# Patient Record
Sex: FEMALE | Race: WHITE | Employment: FULL TIME | URBAN - METROPOLITAN AREA
[De-identification: names, ages, dates, MRNs, and addresses within clinical notes are randomized per-mention and may not be internally consistent; named-entity substitution may affect disease eponyms.]

---

## 2018-02-19 ENCOUNTER — OFFICE VISIT (OUTPATIENT)
Dept: FAMILY MEDICINE CLINIC | Age: 68
End: 2018-02-19
Payer: MEDICARE

## 2018-02-19 VITALS
HEIGHT: 64 IN | SYSTOLIC BLOOD PRESSURE: 140 MMHG | DIASTOLIC BLOOD PRESSURE: 80 MMHG | BODY MASS INDEX: 30.56 KG/M2 | WEIGHT: 179 LBS | TEMPERATURE: 97.4 F | RESPIRATION RATE: 18 BRPM

## 2018-02-19 DIAGNOSIS — Z86.61 HISTORY OF MENINGITIS: ICD-10-CM

## 2018-02-19 DIAGNOSIS — M19.011 PRIMARY OSTEOARTHRITIS OF BOTH SHOULDERS: ICD-10-CM

## 2018-02-19 DIAGNOSIS — J45.20 MILD INTERMITTENT ASTHMA WITHOUT COMPLICATION: Primary | ICD-10-CM

## 2018-02-19 DIAGNOSIS — K44.9 HIATAL HERNIA: ICD-10-CM

## 2018-02-19 DIAGNOSIS — D69.1 DELTA STORAGE POOL DISEASE (HCC): ICD-10-CM

## 2018-02-19 DIAGNOSIS — D68.51 HOMOZYGOUS FACTOR V LEIDEN MUTATION (HCC): ICD-10-CM

## 2018-02-19 DIAGNOSIS — K21.9 GASTROESOPHAGEAL REFLUX DISEASE, ESOPHAGITIS PRESENCE NOT SPECIFIED: ICD-10-CM

## 2018-02-19 DIAGNOSIS — D12.6 COLON ADENOMA: ICD-10-CM

## 2018-02-19 DIAGNOSIS — Z86.19 HISTORY OF LYME DISEASE: ICD-10-CM

## 2018-02-19 DIAGNOSIS — F41.9 ANXIETY: ICD-10-CM

## 2018-02-19 DIAGNOSIS — G47.9 SLEEP DISTURBANCE: ICD-10-CM

## 2018-02-19 DIAGNOSIS — Z23 NEED FOR VACCINATION WITH 13-POLYVALENT PNEUMOCOCCAL CONJUGATE VACCINE: ICD-10-CM

## 2018-02-19 DIAGNOSIS — M48.02 CERVICAL STENOSIS OF SPINE: ICD-10-CM

## 2018-02-19 DIAGNOSIS — F32.4 MAJOR DEPRESSIVE DISORDER WITH SINGLE EPISODE, IN PARTIAL REMISSION (HCC): ICD-10-CM

## 2018-02-19 DIAGNOSIS — M17.0 PRIMARY OSTEOARTHRITIS OF BOTH KNEES: ICD-10-CM

## 2018-02-19 DIAGNOSIS — E78.5 HYPERLIPIDEMIA, UNSPECIFIED HYPERLIPIDEMIA TYPE: ICD-10-CM

## 2018-02-19 DIAGNOSIS — M19.012 PRIMARY OSTEOARTHRITIS OF BOTH SHOULDERS: ICD-10-CM

## 2018-02-19 PROCEDURE — 3014F SCREEN MAMMO DOC REV: CPT | Performed by: PEDIATRICS

## 2018-02-19 PROCEDURE — 3017F COLORECTAL CA SCREEN DOC REV: CPT | Performed by: PEDIATRICS

## 2018-02-19 PROCEDURE — G8484 FLU IMMUNIZE NO ADMIN: HCPCS | Performed by: PEDIATRICS

## 2018-02-19 PROCEDURE — G0009 ADMIN PNEUMOCOCCAL VACCINE: HCPCS | Performed by: PEDIATRICS

## 2018-02-19 PROCEDURE — 90670 PCV13 VACCINE IM: CPT | Performed by: PEDIATRICS

## 2018-02-19 PROCEDURE — 1123F ACP DISCUSS/DSCN MKR DOCD: CPT | Performed by: PEDIATRICS

## 2018-02-19 PROCEDURE — 1036F TOBACCO NON-USER: CPT | Performed by: PEDIATRICS

## 2018-02-19 PROCEDURE — G8417 CALC BMI ABV UP PARAM F/U: HCPCS | Performed by: PEDIATRICS

## 2018-02-19 PROCEDURE — G8400 PT W/DXA NO RESULTS DOC: HCPCS | Performed by: PEDIATRICS

## 2018-02-19 PROCEDURE — 4040F PNEUMOC VAC/ADMIN/RCVD: CPT | Performed by: PEDIATRICS

## 2018-02-19 PROCEDURE — 99205 OFFICE O/P NEW HI 60 MIN: CPT | Performed by: PEDIATRICS

## 2018-02-19 PROCEDURE — 1090F PRES/ABSN URINE INCON ASSESS: CPT | Performed by: PEDIATRICS

## 2018-02-19 PROCEDURE — G8427 DOCREV CUR MEDS BY ELIG CLIN: HCPCS | Performed by: PEDIATRICS

## 2018-02-19 RX ORDER — DOXYCYCLINE HYCLATE 100 MG
TABLET ORAL
Refills: 0 | COMMUNITY
Start: 2018-01-18 | End: 2018-02-19 | Stop reason: ALTCHOICE

## 2018-02-19 RX ORDER — BUDESONIDE AND FORMOTEROL FUMARATE DIHYDRATE 160; 4.5 UG/1; UG/1
AEROSOL RESPIRATORY (INHALATION)
Refills: 1 | COMMUNITY
Start: 2017-12-20 | End: 2020-09-09

## 2018-02-19 RX ORDER — METHYLPREDNISOLONE 4 MG/1
TABLET ORAL
Refills: 0 | COMMUNITY
Start: 2018-01-08 | End: 2018-02-19 | Stop reason: ALTCHOICE

## 2018-02-19 RX ORDER — GABAPENTIN 100 MG/1
CAPSULE ORAL
Refills: 0 | COMMUNITY
Start: 2018-01-16 | End: 2018-02-19 | Stop reason: DRUGHIGH

## 2018-02-19 RX ORDER — FEXOFENADINE HCL 180 MG
TABLET ORAL
Refills: 1 | COMMUNITY
Start: 2018-01-28

## 2018-02-19 RX ORDER — TRAZODONE HYDROCHLORIDE 150 MG/1
150 TABLET ORAL
COMMUNITY
End: 2018-07-11 | Stop reason: SDUPTHER

## 2018-02-19 RX ORDER — EZETIMIBE 10 MG/1
TABLET ORAL
Refills: 0 | COMMUNITY
Start: 2018-02-13 | End: 2018-07-11 | Stop reason: SDUPTHER

## 2018-02-19 RX ORDER — TIZANIDINE 4 MG/1
TABLET ORAL
Refills: 0 | COMMUNITY
Start: 2018-02-15 | End: 2018-11-12 | Stop reason: ALTCHOICE

## 2018-02-19 RX ORDER — DEXLANSOPRAZOLE 60 MG/1
CAPSULE, DELAYED RELEASE ORAL
Refills: 0 | COMMUNITY
Start: 2017-12-18 | End: 2018-02-19 | Stop reason: ALTCHOICE

## 2018-02-19 RX ORDER — MONTELUKAST SODIUM 10 MG/1
TABLET ORAL
COMMUNITY
End: 2018-07-11 | Stop reason: SDUPTHER

## 2018-02-19 RX ORDER — CEPHALEXIN 500 MG/1
500 CAPSULE ORAL
COMMUNITY
End: 2018-02-19 | Stop reason: ALTCHOICE

## 2018-02-19 RX ORDER — CELECOXIB 200 MG/1
CAPSULE ORAL
Refills: 0 | COMMUNITY
Start: 2018-02-12 | End: 2018-07-11 | Stop reason: SDUPTHER

## 2018-02-19 RX ORDER — GABAPENTIN 300 MG/1
CAPSULE ORAL
Refills: 0 | COMMUNITY
Start: 2018-02-06 | End: 2018-08-15 | Stop reason: ALTCHOICE

## 2018-02-19 RX ORDER — ALBUTEROL SULFATE 90 UG/1
AEROSOL, METERED RESPIRATORY (INHALATION)
COMMUNITY
End: 2018-11-12 | Stop reason: ALTCHOICE

## 2018-02-19 RX ORDER — TRAZODONE HYDROCHLORIDE 150 MG/1
TABLET ORAL
Refills: 0 | COMMUNITY
Start: 2018-02-13 | End: 2018-02-19 | Stop reason: SDUPTHER

## 2018-02-19 ASSESSMENT — PATIENT HEALTH QUESTIONNAIRE - PHQ9
SUM OF ALL RESPONSES TO PHQ9 QUESTIONS 1 & 2: 0
2. FEELING DOWN, DEPRESSED OR HOPELESS: 0
SUM OF ALL RESPONSES TO PHQ QUESTIONS 1-9: 0
1. LITTLE INTEREST OR PLEASURE IN DOING THINGS: 0

## 2018-02-19 NOTE — PROGRESS NOTES
Subjective:      Patient ID: Charles Mckenzie is a 74 y.o. female. Patient presents to office for new patient appt. Patient has no concerns at this time. Visit Information    Have you changed or started any medications since your last visit including any over-the-counter medicines, vitamins, or herbal medicines? no   Are you having any side effects from any of your medications? -  no  Have you stopped taking any of your medications? Is so, why? -  no    Have you seen any other physician or provider since your last visit? Yes - Records Obtained  Have you had any other diagnostic tests since your last visit? Yes - Records Obtained  Have you been seen in the emergency room and/or had an admission to a hospital since we last saw you? Yes - Records Obtained  Have you had your routine dental cleaning in the past 6 months? yes -     Have you activated your [employer] account? If not, what are your barriers? No:      Patient Care Team:  Drea Ley MD as PCP - General (Internal Medicine)    Medical History Review  Past Medical, Family, and Social History reviewed and does contribute to the patient presenting condition    Health Maintenance   Topic Date Due    Hepatitis C screen  1950    HIV screen  11/05/1967    Lipid screen  11/05/1992    Diabetes screen  11/05/1992    Shingles Vaccine (1 of 2 - 2 Dose Series) 11/05/2002    Colon cancer screen colonoscopy  11/05/2002    Pneumococcal low/med risk (2 of 2 - PPSV23) 02/19/2019    Breast cancer screen  08/07/2019    Cervical cancer screen  08/02/2020    DTaP/Tdap/Td vaccine (2 - Td) 03/01/2021    DEXA (modify frequency per FRAX score)  Completed       HPI    Patient presents today for new patient appointment. She has quite an extensive history which she does review with me. She states that she does have a history of mild intermittent asthma which was diagnosed when she was 30years old.   She has had 2 sets of pulmonary function tests in the

## 2018-02-25 PROBLEM — Z86.61 HISTORY OF MENINGITIS: Status: ACTIVE | Noted: 2018-02-25

## 2018-02-25 PROBLEM — F41.9 ANXIETY: Status: ACTIVE | Noted: 2018-02-25

## 2018-02-25 PROBLEM — F32.4 MAJOR DEPRESSIVE DISORDER WITH SINGLE EPISODE, IN PARTIAL REMISSION (HCC): Status: ACTIVE | Noted: 2018-02-25

## 2018-02-25 PROBLEM — Z86.19 HISTORY OF LYME DISEASE: Status: ACTIVE | Noted: 2018-02-25

## 2018-02-25 PROBLEM — G47.9 SLEEP DISTURBANCE: Status: ACTIVE | Noted: 2018-02-25

## 2018-02-25 PROBLEM — K21.9 GASTROESOPHAGEAL REFLUX DISEASE: Status: ACTIVE | Noted: 2018-02-25

## 2018-02-25 PROBLEM — D12.6 COLON ADENOMA: Status: ACTIVE | Noted: 2018-02-25

## 2018-02-25 PROBLEM — K44.9 HIATAL HERNIA: Status: ACTIVE | Noted: 2018-02-25

## 2018-02-25 ASSESSMENT — ENCOUNTER SYMPTOMS
ABDOMINAL PAIN: 0
BACK PAIN: 1
CHEST TIGHTNESS: 0
SHORTNESS OF BREATH: 0
VOMITING: 0
COUGH: 0
PHOTOPHOBIA: 0
CONSTIPATION: 0
COLOR CHANGE: 0
EYE REDNESS: 0
DIARRHEA: 0
EYE PAIN: 0
STRIDOR: 0
WHEEZING: 0

## 2018-07-11 DIAGNOSIS — M19.012 PRIMARY OSTEOARTHRITIS OF BOTH SHOULDERS: ICD-10-CM

## 2018-07-11 DIAGNOSIS — M19.011 PRIMARY OSTEOARTHRITIS OF BOTH SHOULDERS: ICD-10-CM

## 2018-07-11 DIAGNOSIS — M17.0 PRIMARY OSTEOARTHRITIS OF BOTH KNEES: ICD-10-CM

## 2018-07-11 DIAGNOSIS — J45.20 MILD INTERMITTENT ASTHMA WITHOUT COMPLICATION: ICD-10-CM

## 2018-07-11 DIAGNOSIS — F32.4 MAJOR DEPRESSIVE DISORDER WITH SINGLE EPISODE, IN PARTIAL REMISSION (HCC): Primary | ICD-10-CM

## 2018-07-11 DIAGNOSIS — E78.5 HYPERLIPIDEMIA, UNSPECIFIED HYPERLIPIDEMIA TYPE: ICD-10-CM

## 2018-07-11 RX ORDER — TRAZODONE HYDROCHLORIDE 150 MG/1
150 TABLET ORAL NIGHTLY
Qty: 90 TABLET | Refills: 1 | Status: SHIPPED | OUTPATIENT
Start: 2018-07-11 | End: 2018-11-12 | Stop reason: SDUPTHER

## 2018-07-11 RX ORDER — MONTELUKAST SODIUM 10 MG/1
10 TABLET ORAL NIGHTLY
Qty: 90 TABLET | Refills: 1 | Status: SHIPPED | OUTPATIENT
Start: 2018-07-11 | End: 2019-01-30 | Stop reason: SDUPTHER

## 2018-07-11 RX ORDER — CELECOXIB 200 MG/1
200 CAPSULE ORAL DAILY
Qty: 90 CAPSULE | Refills: 1 | Status: SHIPPED | OUTPATIENT
Start: 2018-07-11 | End: 2018-11-12 | Stop reason: SDUPTHER

## 2018-07-11 RX ORDER — EZETIMIBE 10 MG/1
10 TABLET ORAL DAILY
Qty: 90 TABLET | Refills: 1 | Status: SHIPPED | OUTPATIENT
Start: 2018-07-11 | End: 2018-11-12 | Stop reason: SDUPTHER

## 2018-08-15 ENCOUNTER — HOSPITAL ENCOUNTER (OUTPATIENT)
Age: 68
Setting detail: SPECIMEN
Discharge: HOME | End: 2018-08-15
Payer: MEDICARE

## 2018-08-15 ENCOUNTER — OFFICE VISIT (OUTPATIENT)
Dept: FAMILY MEDICINE CLINIC | Age: 68
End: 2018-08-15
Payer: MEDICARE

## 2018-08-15 VITALS
HEART RATE: 76 BPM | TEMPERATURE: 98.2 F | RESPIRATION RATE: 16 BRPM | SYSTOLIC BLOOD PRESSURE: 136 MMHG | WEIGHT: 175 LBS | BODY MASS INDEX: 30.04 KG/M2 | DIASTOLIC BLOOD PRESSURE: 88 MMHG

## 2018-08-15 DIAGNOSIS — Z86.19 HISTORY OF LYME DISEASE: ICD-10-CM

## 2018-08-15 DIAGNOSIS — Z86.61 HISTORY OF MENINGITIS: ICD-10-CM

## 2018-08-15 DIAGNOSIS — J45.21 MILD INTERMITTENT ASTHMA WITH ACUTE EXACERBATION: ICD-10-CM

## 2018-08-15 DIAGNOSIS — F32.0 CURRENT MILD EPISODE OF MAJOR DEPRESSIVE DISORDER WITHOUT PRIOR EPISODE (HCC): Primary | ICD-10-CM

## 2018-08-15 DIAGNOSIS — Z83.49 FAMILY HISTORY OF THYROID DISORDER: ICD-10-CM

## 2018-08-15 DIAGNOSIS — E55.9 VITAMIN D DEFICIENCY: ICD-10-CM

## 2018-08-15 DIAGNOSIS — G44.221 CHRONIC TENSION-TYPE HEADACHE, INTRACTABLE: ICD-10-CM

## 2018-08-15 DIAGNOSIS — E78.5 HYPERLIPIDEMIA, UNSPECIFIED HYPERLIPIDEMIA TYPE: ICD-10-CM

## 2018-08-15 DIAGNOSIS — R53.83 OTHER FATIGUE: ICD-10-CM

## 2018-08-15 DIAGNOSIS — K21.9 GASTROESOPHAGEAL REFLUX DISEASE, ESOPHAGITIS PRESENCE NOT SPECIFIED: ICD-10-CM

## 2018-08-15 DIAGNOSIS — M48.02 CERVICAL STENOSIS OF SPINE: ICD-10-CM

## 2018-08-15 DIAGNOSIS — S16.1XXA STRAIN OF NECK MUSCLE, INITIAL ENCOUNTER: ICD-10-CM

## 2018-08-15 LAB
ALBUMIN SERPL-MCNC: 4.4 G/DL (ref 3.5–5.2)
ALBUMIN/GLOBULIN RATIO: 1.8 (ref 1–2.5)
ALP BLD-CCNC: 82 U/L (ref 35–104)
ALT SERPL-CCNC: 42 U/L (ref 5–33)
ANION GAP SERPL CALCULATED.3IONS-SCNC: 15 MMOL/L (ref 9–17)
AST SERPL-CCNC: 29 U/L
BILIRUB SERPL-MCNC: 0.27 MG/DL (ref 0.3–1.2)
BUN BLDV-MCNC: 18 MG/DL (ref 8–23)
BUN/CREAT BLD: ABNORMAL (ref 9–20)
CALCIUM SERPL-MCNC: 9.2 MG/DL (ref 8.6–10.4)
CHLORIDE BLD-SCNC: 102 MMOL/L (ref 98–107)
CHOLESTEROL/HDL RATIO: 3.6
CHOLESTEROL: 237 MG/DL
CO2: 24 MMOL/L (ref 20–31)
CREAT SERPL-MCNC: 0.61 MG/DL (ref 0.5–0.9)
GFR AFRICAN AMERICAN: >60 ML/MIN
GFR NON-AFRICAN AMERICAN: >60 ML/MIN
GFR SERPL CREATININE-BSD FRML MDRD: ABNORMAL ML/MIN/{1.73_M2}
GFR SERPL CREATININE-BSD FRML MDRD: ABNORMAL ML/MIN/{1.73_M2}
GLUCOSE BLD-MCNC: 98 MG/DL (ref 70–99)
HCT VFR BLD CALC: 42.5 % (ref 36.3–47.1)
HDLC SERPL-MCNC: 65 MG/DL
HEMOGLOBIN: 13.6 G/DL (ref 11.9–15.1)
LDL CHOLESTEROL: 138 MG/DL (ref 0–130)
MCH RBC QN AUTO: 29.8 PG (ref 25.2–33.5)
MCHC RBC AUTO-ENTMCNC: 32 G/DL (ref 28.4–34.8)
MCV RBC AUTO: 93 FL (ref 82.6–102.9)
NRBC AUTOMATED: 0 PER 100 WBC
PDW BLD-RTO: 13.5 % (ref 11.8–14.4)
PLATELET # BLD: 250 K/UL (ref 138–453)
PMV BLD AUTO: 8.7 FL (ref 8.1–13.5)
POTASSIUM SERPL-SCNC: 4.3 MMOL/L (ref 3.7–5.3)
RBC # BLD: 4.57 M/UL (ref 3.95–5.11)
SODIUM BLD-SCNC: 141 MMOL/L (ref 135–144)
THYROXINE, FREE: 1.27 NG/DL (ref 0.93–1.7)
TOTAL PROTEIN: 6.9 G/DL (ref 6.4–8.3)
TRIGL SERPL-MCNC: 169 MG/DL
TSH SERPL DL<=0.05 MIU/L-ACNC: 0.94 MIU/L (ref 0.3–5)
VITAMIN B-12: 726 PG/ML (ref 232–1245)
VITAMIN D 25-HYDROXY: 38.5 NG/ML (ref 30–100)
VLDLC SERPL CALC-MCNC: ABNORMAL MG/DL (ref 1–30)
WBC # BLD: 5.6 K/UL (ref 3.5–11.3)

## 2018-08-15 PROCEDURE — 1101F PT FALLS ASSESS-DOCD LE1/YR: CPT | Performed by: PEDIATRICS

## 2018-08-15 PROCEDURE — G8417 CALC BMI ABV UP PARAM F/U: HCPCS | Performed by: PEDIATRICS

## 2018-08-15 PROCEDURE — G8400 PT W/DXA NO RESULTS DOC: HCPCS | Performed by: PEDIATRICS

## 2018-08-15 PROCEDURE — G8427 DOCREV CUR MEDS BY ELIG CLIN: HCPCS | Performed by: PEDIATRICS

## 2018-08-15 PROCEDURE — 4040F PNEUMOC VAC/ADMIN/RCVD: CPT | Performed by: PEDIATRICS

## 2018-08-15 PROCEDURE — 1090F PRES/ABSN URINE INCON ASSESS: CPT | Performed by: PEDIATRICS

## 2018-08-15 PROCEDURE — 3017F COLORECTAL CA SCREEN DOC REV: CPT | Performed by: PEDIATRICS

## 2018-08-15 PROCEDURE — 1036F TOBACCO NON-USER: CPT | Performed by: PEDIATRICS

## 2018-08-15 PROCEDURE — 1123F ACP DISCUSS/DSCN MKR DOCD: CPT | Performed by: PEDIATRICS

## 2018-08-15 PROCEDURE — 99214 OFFICE O/P EST MOD 30 MIN: CPT | Performed by: PEDIATRICS

## 2018-08-15 RX ORDER — ALBUTEROL SULFATE 90 UG/1
2 AEROSOL, METERED RESPIRATORY (INHALATION) EVERY 4 HOURS PRN
Qty: 1 INHALER | Refills: 2 | Status: SHIPPED | OUTPATIENT
Start: 2018-08-15 | End: 2019-11-19 | Stop reason: SDUPTHER

## 2018-08-15 RX ORDER — RANITIDINE 150 MG/1
150 TABLET ORAL 2 TIMES DAILY
Qty: 60 TABLET | Refills: 2 | Status: SHIPPED | OUTPATIENT
Start: 2018-08-15 | End: 2018-11-16 | Stop reason: SDUPTHER

## 2018-08-15 RX ORDER — CITALOPRAM 10 MG/1
10 TABLET ORAL DAILY
Qty: 30 TABLET | Refills: 2 | Status: SHIPPED | OUTPATIENT
Start: 2018-08-15 | End: 2018-11-12 | Stop reason: ALTCHOICE

## 2018-08-15 ASSESSMENT — ENCOUNTER SYMPTOMS
SHORTNESS OF BREATH: 0
PHOTOPHOBIA: 0
COUGH: 1
CONSTIPATION: 0
EYE PAIN: 0
HOARSE VOICE: 1
EYE REDNESS: 0
DIARRHEA: 0
COLOR CHANGE: 0
BACK PAIN: 1
SORE THROAT: 0
CHEST TIGHTNESS: 0
FREQUENT THROAT CLEARING: 1
VOMITING: 0
RHINORRHEA: 0
STRIDOR: 0
ABDOMINAL PAIN: 0
WHEEZING: 0

## 2018-08-15 NOTE — PROGRESS NOTES
 albuterol sulfate HFA (VENTOLIN HFA) 108 (90 Base) MCG/ACT inhaler Inhale 2 puffs into the lungs every 4 hours as needed for Wheezing 1 Inhaler 2    celecoxib (CELEBREX) 200 MG capsule Take 1 capsule by mouth daily 90 capsule 1    montelukast (SINGULAIR) 10 MG tablet Take 1 tablet by mouth nightly 90 tablet 1    ezetimibe (ZETIA) 10 MG tablet Take 1 tablet by mouth daily 90 tablet 1    albuterol sulfate  (90 Base) MCG/ACT inhaler Inhale into the lungs      SYMBICORT 160-4.5 MCG/ACT AERO   1    RA ALLERGY RELIEF 180 MG tablet   1    tiZANidine (ZANAFLEX) 4 MG tablet take 1 tablet by mouth every 8 hours if needed  0    ondansetron (ZOFRAN) 4 MG tablet Take 1 tablet by mouth every 6-8 hours as needed for Nausea or Vomiting 60 tablet 1    traZODone (DESYREL) 150 MG tablet Take 1 tablet by mouth nightly 90 tablet 1     No current facility-administered medications for this visit. HPI:     Patient presents today for routine follow-up however she has multiple complaints today. She does complain that she has not felt well for some time now. She does have a history of major depression and she is overall feeling more depressed since she has been back to work after her neck surgery. She does feel extreme fatigue and she has had a headache since April 1. She states that she feels like she has a tight band across her head. She states that she has a muscle spasm goes up the back of her neck towards her head and this occurs whenever she uses her hands or arms for anything. She states it is difficult for her to sleep the cause of this. She is not able to find a comfortable position. She did have a posterior cervical laminectomy in January with Dr. Yaakov Rondon. She is still doing physical therapy for this. She states she only gets about 5 hours of sleep at night. She is worried about having a possible flareup of her Lyme disease.   She does think that this may be a recurrence of her meningitis that she use as needed  Patient will find out what controller inhaler would be covered in place of Symbicort as this is expensive and not covered  Shingles vaccine recommended  Call with concerns or worsening symptoms      Toshia received counseling on the following healthy behaviors: nutrition, exercise and medication adherence  Reviewed prior labs and health maintenance. Continue current medications, diet and exercise. Discussed use, benefit, and side effects of prescribed medications. Barriers to medication compliance addressed. Patient given educational materials - see patient instructions. All patient questions answered. Patient voiced understanding.            Electronically signed by Riddhi Logan MD on 8/21/2018 at 11:19 PM

## 2018-08-16 LAB
THYROGLOBULIN AB: <20 IU/ML (ref 0–40)
THYROID PEROXIDASE (TPO) AB: <10 IU/ML (ref 0–35)

## 2018-08-20 ENCOUNTER — TELEPHONE (OUTPATIENT)
Dept: FAMILY MEDICINE CLINIC | Age: 68
End: 2018-08-20

## 2018-08-20 DIAGNOSIS — R74.01 ELEVATED ALT MEASUREMENT: Primary | ICD-10-CM

## 2018-08-20 DIAGNOSIS — R11.0 NAUSEA: ICD-10-CM

## 2018-08-20 DIAGNOSIS — E78.5 HYPERLIPIDEMIA, UNSPECIFIED HYPERLIPIDEMIA TYPE: ICD-10-CM

## 2018-08-20 RX ORDER — ONDANSETRON 4 MG/1
4 TABLET, FILM COATED ORAL
Qty: 60 TABLET | Refills: 1 | Status: SHIPPED | OUTPATIENT
Start: 2018-08-20 | End: 2019-08-20

## 2018-09-26 ENCOUNTER — OFFICE VISIT (OUTPATIENT)
Dept: FAMILY MEDICINE CLINIC | Age: 68
End: 2018-09-26
Payer: MEDICARE

## 2018-09-26 VITALS
BODY MASS INDEX: 29.87 KG/M2 | RESPIRATION RATE: 20 BRPM | SYSTOLIC BLOOD PRESSURE: 128 MMHG | DIASTOLIC BLOOD PRESSURE: 86 MMHG | HEART RATE: 84 BPM | WEIGHT: 174 LBS | TEMPERATURE: 98.2 F

## 2018-09-26 DIAGNOSIS — R53.83 OTHER FATIGUE: ICD-10-CM

## 2018-09-26 DIAGNOSIS — G44.221 CHRONIC TENSION-TYPE HEADACHE, INTRACTABLE: ICD-10-CM

## 2018-09-26 DIAGNOSIS — F32.4 MAJOR DEPRESSIVE DISORDER WITH SINGLE EPISODE, IN PARTIAL REMISSION (HCC): Primary | ICD-10-CM

## 2018-09-26 DIAGNOSIS — R10.13 EPIGASTRIC PAIN: ICD-10-CM

## 2018-09-26 DIAGNOSIS — R11.0 NAUSEA: ICD-10-CM

## 2018-09-26 DIAGNOSIS — Z86.19 HISTORY OF LYME DISEASE: ICD-10-CM

## 2018-09-26 DIAGNOSIS — K21.9 GASTROESOPHAGEAL REFLUX DISEASE, ESOPHAGITIS PRESENCE NOT SPECIFIED: ICD-10-CM

## 2018-09-26 DIAGNOSIS — Z86.61 HISTORY OF MENINGITIS: ICD-10-CM

## 2018-09-26 PROCEDURE — 1090F PRES/ABSN URINE INCON ASSESS: CPT | Performed by: PEDIATRICS

## 2018-09-26 PROCEDURE — G8417 CALC BMI ABV UP PARAM F/U: HCPCS | Performed by: PEDIATRICS

## 2018-09-26 PROCEDURE — 1036F TOBACCO NON-USER: CPT | Performed by: PEDIATRICS

## 2018-09-26 PROCEDURE — 1123F ACP DISCUSS/DSCN MKR DOCD: CPT | Performed by: PEDIATRICS

## 2018-09-26 PROCEDURE — 3017F COLORECTAL CA SCREEN DOC REV: CPT | Performed by: PEDIATRICS

## 2018-09-26 PROCEDURE — G8427 DOCREV CUR MEDS BY ELIG CLIN: HCPCS | Performed by: PEDIATRICS

## 2018-09-26 PROCEDURE — 99214 OFFICE O/P EST MOD 30 MIN: CPT | Performed by: PEDIATRICS

## 2018-09-26 PROCEDURE — 1101F PT FALLS ASSESS-DOCD LE1/YR: CPT | Performed by: PEDIATRICS

## 2018-09-26 PROCEDURE — G8400 PT W/DXA NO RESULTS DOC: HCPCS | Performed by: PEDIATRICS

## 2018-09-26 PROCEDURE — 4040F PNEUMOC VAC/ADMIN/RCVD: CPT | Performed by: PEDIATRICS

## 2018-09-26 RX ORDER — DESVENLAFAXINE 50 MG/1
50 TABLET, EXTENDED RELEASE ORAL DAILY
Qty: 30 TABLET | Refills: 2 | Status: SHIPPED | OUTPATIENT
Start: 2018-09-26 | End: 2018-11-16 | Stop reason: SDUPTHER

## 2018-09-26 ASSESSMENT — ENCOUNTER SYMPTOMS
COUGH: 0
PHOTOPHOBIA: 0
BLOOD IN STOOL: 0
BLURRED VISION: 0
SORE THROAT: 0
BACK PAIN: 0
SWOLLEN GLANDS: 0
SINUS PRESSURE: 0
EYE PAIN: 0
FACIAL SWEATING: 0
VOMITING: 0
WHEEZING: 0
DIARRHEA: 0
COLOR CHANGE: 0
EYE WATERING: 0
NAUSEA: 1
EYE REDNESS: 0
CHEST TIGHTNESS: 0
VISUAL CHANGE: 0
SCALP TENDERNESS: 0
CONSTIPATION: 0
RHINORRHEA: 0
STRIDOR: 0
SHORTNESS OF BREATH: 0

## 2018-09-27 ENCOUNTER — TELEPHONE (OUTPATIENT)
Dept: FAMILY MEDICINE CLINIC | Age: 68
End: 2018-09-27

## 2018-09-27 DIAGNOSIS — K21.9 GASTROESOPHAGEAL REFLUX DISEASE, ESOPHAGITIS PRESENCE NOT SPECIFIED: ICD-10-CM

## 2018-09-27 DIAGNOSIS — R10.13 EPIGASTRIC PAIN: Primary | ICD-10-CM

## 2018-09-27 DIAGNOSIS — K44.9 HIATAL HERNIA: ICD-10-CM

## 2018-09-27 DIAGNOSIS — R11.0 NAUSEA: ICD-10-CM

## 2018-09-28 DIAGNOSIS — R11.0 NAUSEA: Primary | ICD-10-CM

## 2018-09-28 DIAGNOSIS — R10.13 EPIGASTRIC PAIN: ICD-10-CM

## 2018-10-02 ASSESSMENT — ENCOUNTER SYMPTOMS: ABDOMINAL PAIN: 1

## 2018-10-03 ENCOUNTER — HOSPITAL ENCOUNTER (OUTPATIENT)
Dept: ULTRASOUND IMAGING | Facility: CLINIC | Age: 68
Discharge: HOME | End: 2018-10-05
Payer: MEDICARE

## 2018-10-03 DIAGNOSIS — R11.0 NAUSEA: ICD-10-CM

## 2018-10-03 DIAGNOSIS — R10.13 EPIGASTRIC PAIN: ICD-10-CM

## 2018-10-03 PROCEDURE — 76705 ECHO EXAM OF ABDOMEN: CPT

## 2018-11-12 ENCOUNTER — OFFICE VISIT (OUTPATIENT)
Dept: FAMILY MEDICINE CLINIC | Age: 68
End: 2018-11-12
Payer: MEDICARE

## 2018-11-12 VITALS
DIASTOLIC BLOOD PRESSURE: 88 MMHG | SYSTOLIC BLOOD PRESSURE: 136 MMHG | TEMPERATURE: 97 F | BODY MASS INDEX: 29.52 KG/M2 | RESPIRATION RATE: 16 BRPM | HEART RATE: 80 BPM | WEIGHT: 172 LBS

## 2018-11-12 DIAGNOSIS — G47.9 SLEEP DISTURBANCE: ICD-10-CM

## 2018-11-12 DIAGNOSIS — M19.012 PRIMARY OSTEOARTHRITIS OF BOTH SHOULDERS: ICD-10-CM

## 2018-11-12 DIAGNOSIS — E78.5 HYPERLIPIDEMIA, UNSPECIFIED HYPERLIPIDEMIA TYPE: ICD-10-CM

## 2018-11-12 DIAGNOSIS — M54.2 NECK PAIN: ICD-10-CM

## 2018-11-12 DIAGNOSIS — R53.83 OTHER FATIGUE: ICD-10-CM

## 2018-11-12 DIAGNOSIS — Z86.61 HISTORY OF MENINGITIS: ICD-10-CM

## 2018-11-12 DIAGNOSIS — G44.221 CHRONIC TENSION-TYPE HEADACHE, INTRACTABLE: ICD-10-CM

## 2018-11-12 DIAGNOSIS — M17.0 PRIMARY OSTEOARTHRITIS OF BOTH KNEES: ICD-10-CM

## 2018-11-12 DIAGNOSIS — R10.13 EPIGASTRIC PAIN: ICD-10-CM

## 2018-11-12 DIAGNOSIS — R11.0 NAUSEA: ICD-10-CM

## 2018-11-12 DIAGNOSIS — M19.011 PRIMARY OSTEOARTHRITIS OF BOTH SHOULDERS: ICD-10-CM

## 2018-11-12 DIAGNOSIS — F32.4 MAJOR DEPRESSIVE DISORDER WITH SINGLE EPISODE, IN PARTIAL REMISSION (HCC): Primary | ICD-10-CM

## 2018-11-12 DIAGNOSIS — K44.9 HIATAL HERNIA: ICD-10-CM

## 2018-11-12 DIAGNOSIS — M48.02 CERVICAL STENOSIS OF SPINE: ICD-10-CM

## 2018-11-12 DIAGNOSIS — K21.9 GASTROESOPHAGEAL REFLUX DISEASE, ESOPHAGITIS PRESENCE NOT SPECIFIED: ICD-10-CM

## 2018-11-12 DIAGNOSIS — Z86.19 HISTORY OF LYME DISEASE: ICD-10-CM

## 2018-11-12 PROCEDURE — G8484 FLU IMMUNIZE NO ADMIN: HCPCS | Performed by: PEDIATRICS

## 2018-11-12 PROCEDURE — 99214 OFFICE O/P EST MOD 30 MIN: CPT | Performed by: PEDIATRICS

## 2018-11-12 PROCEDURE — G8427 DOCREV CUR MEDS BY ELIG CLIN: HCPCS | Performed by: PEDIATRICS

## 2018-11-12 PROCEDURE — 4040F PNEUMOC VAC/ADMIN/RCVD: CPT | Performed by: PEDIATRICS

## 2018-11-12 PROCEDURE — 1101F PT FALLS ASSESS-DOCD LE1/YR: CPT | Performed by: PEDIATRICS

## 2018-11-12 PROCEDURE — 1090F PRES/ABSN URINE INCON ASSESS: CPT | Performed by: PEDIATRICS

## 2018-11-12 PROCEDURE — G8417 CALC BMI ABV UP PARAM F/U: HCPCS | Performed by: PEDIATRICS

## 2018-11-12 PROCEDURE — 1123F ACP DISCUSS/DSCN MKR DOCD: CPT | Performed by: PEDIATRICS

## 2018-11-12 PROCEDURE — 3017F COLORECTAL CA SCREEN DOC REV: CPT | Performed by: PEDIATRICS

## 2018-11-12 PROCEDURE — G8400 PT W/DXA NO RESULTS DOC: HCPCS | Performed by: PEDIATRICS

## 2018-11-12 PROCEDURE — 1036F TOBACCO NON-USER: CPT | Performed by: PEDIATRICS

## 2018-11-12 RX ORDER — CELECOXIB 200 MG/1
200 CAPSULE ORAL DAILY
Qty: 90 CAPSULE | Refills: 1 | Status: SHIPPED | OUTPATIENT
Start: 2018-11-12 | End: 2019-08-15 | Stop reason: SDUPTHER

## 2018-11-12 RX ORDER — TRAZODONE HYDROCHLORIDE 150 MG/1
150 TABLET ORAL NIGHTLY
Qty: 90 TABLET | Refills: 1 | Status: SHIPPED | OUTPATIENT
Start: 2018-11-12 | End: 2019-07-29 | Stop reason: SDUPTHER

## 2018-11-12 RX ORDER — EZETIMIBE 10 MG/1
10 TABLET ORAL DAILY
Qty: 90 TABLET | Refills: 1 | Status: SHIPPED | OUTPATIENT
Start: 2018-11-12 | End: 2019-08-29 | Stop reason: SDUPTHER

## 2018-11-12 ASSESSMENT — ENCOUNTER SYMPTOMS
BACK PAIN: 0
PHOTOPHOBIA: 0
RHINORRHEA: 0
SHORTNESS OF BREATH: 0
COUGH: 0
COLOR CHANGE: 0
EYE PAIN: 0
DIARRHEA: 0
BLOOD IN STOOL: 0
STRIDOR: 0
SORE THROAT: 0
SINUS PRESSURE: 0
CONSTIPATION: 0
CHEST TIGHTNESS: 0
EYE REDNESS: 0
WHEEZING: 0
ABDOMINAL PAIN: 1
NAUSEA: 1
VOMITING: 0

## 2018-11-12 NOTE — PROGRESS NOTES
She did see her neurologist, Dr. Teri Ma and had an MRI done which was essentially normal and did not show any signs of acute infection. Apparently he decided to treat her with doxycycline for one month and the patient states that her symptoms did not change. He thinks her headaches are secondary to her neck. She does continue to have neck pain with a history of cervical stenosis of her spine. Her headaches have been persistent since her surgery. She was referred to pain management, Dr. Maicol Nnio and she will be seeing him at the end of the month for possible nerve block. Her neurologist did tell her that her headache may never go away. He was hoping that she may benefit from gabapentin 400 mg 3 times daily but she was unable to take this due to side effects of fatigue and unsteadiness. She does continue to have nausea and epigastric pain which was thought to be secondary to GERD and her hiatal hernia. She had a normal gallbladder ultrasound and recently had a normal HIDA scan. She did see GI at [facility], Dr. Isabel Koch did have an EGD with biopsies. She also had a colonoscopy and did have 2 polyps removed. She is unsure of the pathology of these polyps at this time. She also is awaiting the H. pylori results from her EGD. GI told her that she will need a motility study if her H. pylori testing is negative. She does still continue to take Nexium daily and Zantac. She would like to stop the Zantac because she has not noticed any difference with this. She does have Zofran to use as needed. She does have a history of hyperlipidemia for which she takes visit. She does need a refill for this. She also has a history of osteoarthritis of both shoulders and both knees which does bother her frequently. She has been using Celebrex which is helpful and diclofenac gel which is helpful. She does have an appointment with Dr. Ciera Gudino on Wednesday for left shoulder pain which she thinks is a biceps tendinitis.   She does celecoxib (CELEBREX) 200 MG capsule   15. Primary osteoarthritis of both knees  celecoxib (CELEBREX) 200 MG capsule           Plan:     Continue current medications  Stress relieving activities encouraged  Daily exercise and healthy diet encouraged  Recommend stretching exercises for neck and encouraged  Good sleep hygiene recommended  Follow-up with neurology as scheduled  Follow-up with pain management as scheduled  Discontinue zantac / continue nexium  Follow-up with GI  Consider general surgery consult for evaluation of hiatal hernia  Follow up with ortho as scheduled  Refill medications as requested  Shingles vaccine recommended  Call with concerns        Humberto Sanders received counseling on the following healthy behaviors: nutrition, exercise and medication adherence  Reviewed prior labs and health maintenance. Continue current medications, diet and exercise. Discussed use, benefit, and side effects of prescribed medications. Barriers to medication compliance addressed. Patient given educational materials - see patient instructions. All patient questions answered. Patient voiced understanding.         Electronically signed by Savage Hilario MD on 11/13/2018 at 8:54 AM

## 2018-11-16 DIAGNOSIS — K21.9 GASTROESOPHAGEAL REFLUX DISEASE, ESOPHAGITIS PRESENCE NOT SPECIFIED: ICD-10-CM

## 2018-11-16 DIAGNOSIS — F32.4 MAJOR DEPRESSIVE DISORDER WITH SINGLE EPISODE, IN PARTIAL REMISSION (HCC): ICD-10-CM

## 2018-11-16 RX ORDER — RANITIDINE 150 MG/1
150 TABLET ORAL 2 TIMES DAILY
Qty: 180 TABLET | Refills: 1 | Status: SHIPPED | OUTPATIENT
Start: 2018-11-16 | End: 2021-02-20

## 2018-11-16 RX ORDER — DESVENLAFAXINE 50 MG/1
50 TABLET, EXTENDED RELEASE ORAL DAILY
Qty: 90 TABLET | Refills: 1 | Status: SHIPPED | OUTPATIENT
Start: 2018-11-16 | End: 2019-05-09 | Stop reason: SDUPTHER

## 2019-01-23 ENCOUNTER — OFFICE VISIT (OUTPATIENT)
Dept: FAMILY MEDICINE CLINIC | Age: 69
End: 2019-01-23
Payer: MEDICARE

## 2019-01-23 VITALS
SYSTOLIC BLOOD PRESSURE: 130 MMHG | DIASTOLIC BLOOD PRESSURE: 76 MMHG | RESPIRATION RATE: 20 BRPM | TEMPERATURE: 98.8 F | HEART RATE: 86 BPM | WEIGHT: 169 LBS | BODY MASS INDEX: 29.01 KG/M2

## 2019-01-23 DIAGNOSIS — R11.0 NAUSEA: ICD-10-CM

## 2019-01-23 DIAGNOSIS — J45.20 MILD INTERMITTENT ASTHMA WITHOUT COMPLICATION: ICD-10-CM

## 2019-01-23 DIAGNOSIS — G89.4 CHRONIC PAIN SYNDROME: ICD-10-CM

## 2019-01-23 DIAGNOSIS — K44.9 HIATAL HERNIA: ICD-10-CM

## 2019-01-23 DIAGNOSIS — M19.012 PRIMARY OSTEOARTHRITIS OF BOTH SHOULDERS: ICD-10-CM

## 2019-01-23 DIAGNOSIS — R10.13 EPIGASTRIC PAIN: ICD-10-CM

## 2019-01-23 DIAGNOSIS — Z86.19 HISTORY OF LYME DISEASE: ICD-10-CM

## 2019-01-23 DIAGNOSIS — E78.5 HYPERLIPIDEMIA, UNSPECIFIED HYPERLIPIDEMIA TYPE: ICD-10-CM

## 2019-01-23 DIAGNOSIS — M17.0 PRIMARY OSTEOARTHRITIS OF BOTH KNEES: ICD-10-CM

## 2019-01-23 DIAGNOSIS — R53.83 OTHER FATIGUE: ICD-10-CM

## 2019-01-23 DIAGNOSIS — M19.011 PRIMARY OSTEOARTHRITIS OF BOTH SHOULDERS: ICD-10-CM

## 2019-01-23 DIAGNOSIS — D69.1 DELTA STORAGE POOL DISEASE (HCC): ICD-10-CM

## 2019-01-23 DIAGNOSIS — D68.51 HOMOZYGOUS FACTOR V LEIDEN MUTATION (HCC): ICD-10-CM

## 2019-01-23 DIAGNOSIS — K21.9 GASTROESOPHAGEAL REFLUX DISEASE, ESOPHAGITIS PRESENCE NOT SPECIFIED: ICD-10-CM

## 2019-01-23 DIAGNOSIS — M54.2 NECK PAIN: ICD-10-CM

## 2019-01-23 DIAGNOSIS — F32.4 MAJOR DEPRESSIVE DISORDER WITH SINGLE EPISODE, IN PARTIAL REMISSION (HCC): Primary | ICD-10-CM

## 2019-01-23 DIAGNOSIS — Z86.61 HISTORY OF MENINGITIS: ICD-10-CM

## 2019-01-23 DIAGNOSIS — G47.9 SLEEP DISTURBANCE: ICD-10-CM

## 2019-01-23 DIAGNOSIS — G44.221 CHRONIC TENSION-TYPE HEADACHE, INTRACTABLE: ICD-10-CM

## 2019-01-23 DIAGNOSIS — M48.02 CERVICAL STENOSIS OF SPINE: ICD-10-CM

## 2019-01-23 PROCEDURE — 3017F COLORECTAL CA SCREEN DOC REV: CPT | Performed by: PEDIATRICS

## 2019-01-23 PROCEDURE — 1036F TOBACCO NON-USER: CPT | Performed by: PEDIATRICS

## 2019-01-23 PROCEDURE — 4040F PNEUMOC VAC/ADMIN/RCVD: CPT | Performed by: PEDIATRICS

## 2019-01-23 PROCEDURE — G8427 DOCREV CUR MEDS BY ELIG CLIN: HCPCS | Performed by: PEDIATRICS

## 2019-01-23 PROCEDURE — 1101F PT FALLS ASSESS-DOCD LE1/YR: CPT | Performed by: PEDIATRICS

## 2019-01-23 PROCEDURE — G8484 FLU IMMUNIZE NO ADMIN: HCPCS | Performed by: PEDIATRICS

## 2019-01-23 PROCEDURE — 1090F PRES/ABSN URINE INCON ASSESS: CPT | Performed by: PEDIATRICS

## 2019-01-23 PROCEDURE — G8417 CALC BMI ABV UP PARAM F/U: HCPCS | Performed by: PEDIATRICS

## 2019-01-23 PROCEDURE — 99214 OFFICE O/P EST MOD 30 MIN: CPT | Performed by: PEDIATRICS

## 2019-01-23 PROCEDURE — 1123F ACP DISCUSS/DSCN MKR DOCD: CPT | Performed by: PEDIATRICS

## 2019-01-23 PROCEDURE — G8400 PT W/DXA NO RESULTS DOC: HCPCS | Performed by: PEDIATRICS

## 2019-01-23 RX ORDER — BUDESONIDE AND FORMOTEROL FUMARATE DIHYDRATE 160; 4.5 UG/1; UG/1
2 AEROSOL RESPIRATORY (INHALATION) 2 TIMES DAILY
Qty: 30.6 G | Refills: 3 | Status: SHIPPED | OUTPATIENT
Start: 2019-01-23 | End: 2021-02-10

## 2019-01-23 RX ORDER — DEXLANSOPRAZOLE 60 MG/1
60 CAPSULE, DELAYED RELEASE ORAL DAILY
Qty: 90 CAPSULE | Refills: 3 | Status: SHIPPED | OUTPATIENT
Start: 2019-01-23 | End: 2019-12-04 | Stop reason: SDUPTHER

## 2019-01-23 ASSESSMENT — ENCOUNTER SYMPTOMS
STRIDOR: 0
ABDOMINAL PAIN: 1
CHEST TIGHTNESS: 0
CONSTIPATION: 0
NAUSEA: 1
BLOOD IN STOOL: 0
EYE REDNESS: 0
COUGH: 0
RHINORRHEA: 0
PHOTOPHOBIA: 0
WHEEZING: 0
COLOR CHANGE: 0
VOMITING: 0
SHORTNESS OF BREATH: 0
SORE THROAT: 0
EYE PAIN: 0
DIARRHEA: 0
SINUS PRESSURE: 0

## 2019-01-27 ASSESSMENT — ENCOUNTER SYMPTOMS: BACK PAIN: 1

## 2019-01-30 DIAGNOSIS — J45.20 MILD INTERMITTENT ASTHMA WITHOUT COMPLICATION: ICD-10-CM

## 2019-01-30 RX ORDER — MONTELUKAST SODIUM 10 MG/1
10 TABLET ORAL NIGHTLY
Qty: 90 TABLET | Refills: 1 | Status: SHIPPED | OUTPATIENT
Start: 2019-01-30 | End: 2019-11-07 | Stop reason: SDUPTHER

## 2019-03-19 DIAGNOSIS — F32.4 MAJOR DEPRESSIVE DISORDER WITH SINGLE EPISODE, IN PARTIAL REMISSION (HCC): ICD-10-CM

## 2019-03-20 RX ORDER — DESVENLAFAXINE 50 MG/1
TABLET, EXTENDED RELEASE ORAL
Qty: 30 TABLET | Refills: 2 | Status: SHIPPED | OUTPATIENT
Start: 2019-03-20 | End: 2019-06-24 | Stop reason: SDUPTHER

## 2019-05-09 ENCOUNTER — OFFICE VISIT (OUTPATIENT)
Dept: FAMILY MEDICINE CLINIC | Age: 69
End: 2019-05-09
Payer: MEDICARE

## 2019-05-09 VITALS
WEIGHT: 165 LBS | RESPIRATION RATE: 20 BRPM | SYSTOLIC BLOOD PRESSURE: 128 MMHG | DIASTOLIC BLOOD PRESSURE: 70 MMHG | TEMPERATURE: 98.8 F | BODY MASS INDEX: 28.32 KG/M2

## 2019-05-09 DIAGNOSIS — R53.83 OTHER FATIGUE: ICD-10-CM

## 2019-05-09 DIAGNOSIS — G89.4 CHRONIC PAIN SYNDROME: ICD-10-CM

## 2019-05-09 DIAGNOSIS — K31.84 GASTROPARESIS: ICD-10-CM

## 2019-05-09 DIAGNOSIS — K44.9 HIATAL HERNIA: ICD-10-CM

## 2019-05-09 DIAGNOSIS — D68.51 HOMOZYGOUS FACTOR V LEIDEN MUTATION (HCC): ICD-10-CM

## 2019-05-09 DIAGNOSIS — F32.4 MAJOR DEPRESSIVE DISORDER WITH SINGLE EPISODE, IN PARTIAL REMISSION (HCC): Primary | ICD-10-CM

## 2019-05-09 DIAGNOSIS — Z86.19 HISTORY OF LYME DISEASE: ICD-10-CM

## 2019-05-09 DIAGNOSIS — E78.5 HYPERLIPIDEMIA, UNSPECIFIED HYPERLIPIDEMIA TYPE: ICD-10-CM

## 2019-05-09 DIAGNOSIS — R10.13 EPIGASTRIC PAIN: ICD-10-CM

## 2019-05-09 DIAGNOSIS — Z86.61 HISTORY OF MENINGITIS: ICD-10-CM

## 2019-05-09 DIAGNOSIS — M17.0 PRIMARY OSTEOARTHRITIS OF BOTH KNEES: ICD-10-CM

## 2019-05-09 DIAGNOSIS — R11.0 NAUSEA: ICD-10-CM

## 2019-05-09 DIAGNOSIS — M19.011 PRIMARY OSTEOARTHRITIS OF BOTH SHOULDERS: ICD-10-CM

## 2019-05-09 DIAGNOSIS — D69.1 DELTA STORAGE POOL DISEASE (HCC): ICD-10-CM

## 2019-05-09 DIAGNOSIS — G47.9 SLEEP DISTURBANCE: ICD-10-CM

## 2019-05-09 DIAGNOSIS — G44.221 CHRONIC TENSION-TYPE HEADACHE, INTRACTABLE: ICD-10-CM

## 2019-05-09 DIAGNOSIS — T63.301A SPIDER BITE WOUND, ACCIDENTAL OR UNINTENTIONAL, INITIAL ENCOUNTER: ICD-10-CM

## 2019-05-09 DIAGNOSIS — M54.2 NECK PAIN: ICD-10-CM

## 2019-05-09 DIAGNOSIS — J45.20 MILD INTERMITTENT ASTHMA WITHOUT COMPLICATION: ICD-10-CM

## 2019-05-09 DIAGNOSIS — K21.9 GASTROESOPHAGEAL REFLUX DISEASE, ESOPHAGITIS PRESENCE NOT SPECIFIED: ICD-10-CM

## 2019-05-09 DIAGNOSIS — M19.012 PRIMARY OSTEOARTHRITIS OF BOTH SHOULDERS: ICD-10-CM

## 2019-05-09 DIAGNOSIS — M48.02 CERVICAL STENOSIS OF SPINE: ICD-10-CM

## 2019-05-09 PROCEDURE — 1123F ACP DISCUSS/DSCN MKR DOCD: CPT | Performed by: PEDIATRICS

## 2019-05-09 PROCEDURE — 3017F COLORECTAL CA SCREEN DOC REV: CPT | Performed by: PEDIATRICS

## 2019-05-09 PROCEDURE — G8427 DOCREV CUR MEDS BY ELIG CLIN: HCPCS | Performed by: PEDIATRICS

## 2019-05-09 PROCEDURE — 1036F TOBACCO NON-USER: CPT | Performed by: PEDIATRICS

## 2019-05-09 PROCEDURE — 96372 THER/PROPH/DIAG INJ SC/IM: CPT | Performed by: PEDIATRICS

## 2019-05-09 PROCEDURE — 4040F PNEUMOC VAC/ADMIN/RCVD: CPT | Performed by: PEDIATRICS

## 2019-05-09 PROCEDURE — 1090F PRES/ABSN URINE INCON ASSESS: CPT | Performed by: PEDIATRICS

## 2019-05-09 PROCEDURE — G8400 PT W/DXA NO RESULTS DOC: HCPCS | Performed by: PEDIATRICS

## 2019-05-09 PROCEDURE — G8417 CALC BMI ABV UP PARAM F/U: HCPCS | Performed by: PEDIATRICS

## 2019-05-09 PROCEDURE — 99215 OFFICE O/P EST HI 40 MIN: CPT | Performed by: PEDIATRICS

## 2019-05-09 RX ORDER — METHYLPREDNISOLONE ACETATE 40 MG/ML
120 INJECTION, SUSPENSION INTRA-ARTICULAR; INTRALESIONAL; INTRAMUSCULAR; SOFT TISSUE ONCE
Status: COMPLETED | OUTPATIENT
Start: 2019-05-09 | End: 2019-05-09

## 2019-05-09 RX ORDER — DOXYCYCLINE HYCLATE 100 MG
100 TABLET ORAL 2 TIMES DAILY
Qty: 20 TABLET | Refills: 1 | Status: SHIPPED | OUTPATIENT
Start: 2019-05-09 | End: 2019-05-19

## 2019-05-09 RX ADMIN — METHYLPREDNISOLONE ACETATE 120 MG: 40 INJECTION, SUSPENSION INTRA-ARTICULAR; INTRALESIONAL; INTRAMUSCULAR; SOFT TISSUE at 14:48

## 2019-05-09 ASSESSMENT — ENCOUNTER SYMPTOMS
BACK PAIN: 1
STRIDOR: 0
COLOR CHANGE: 0
CONSTIPATION: 0
SHORTNESS OF BREATH: 0
NAUSEA: 0
CHEST TIGHTNESS: 0
ABDOMINAL PAIN: 0
COUGH: 0
VOMITING: 0
EYE PAIN: 0
SORE THROAT: 0
WHEEZING: 0
BLOOD IN STOOL: 0
PHOTOPHOBIA: 0
RHINORRHEA: 0
EYE REDNESS: 0
SINUS PRESSURE: 0
DIARRHEA: 0

## 2019-05-09 NOTE — PROGRESS NOTES
Subjective:      Patient ID: Sujata Graham is a 79 y.o. female. Visit Information    Have you changed or started any medications since your last visit including any over-the-counter medicines, vitamins, or herbal medicines? no   Are you having any side effects from any of your medications? -  no  Have you stopped taking any of your medications? Is so, why? -  no    Have you seen any other physician or provider since your last visit? No  Have you had any other diagnostic tests since your last visit? No  Have you been seen in the emergency room and/or had an admission to a hospital since we last saw you? No  Have you had your routine dental cleaning in the past 6 months? yes -     Have you activated your [employer] account? If not, what are your barriers?  No:      Patient Care Team:  Bin Soares MD as PCP - General (Internal Medicine)  Bin Soares MD as PCP - S Attributed Provider    Medical History Review  Past Medical, Family, and Social History reviewed and does not contribute to the patient presenting condition    Health Maintenance   Topic Date Due    Shingles Vaccine (2 of 3) 03/20/2013    Pneumococcal 65+ years Vaccine (2 of 2 - PPSV23) 02/19/2019    Hepatitis C screen  09/26/2019 (Originally 1950)    Breast cancer screen  08/07/2019    DTaP/Tdap/Td vaccine (2 - Td) 03/01/2021    Lipid screen  08/15/2023    Colon cancer screen colonoscopy  11/08/2028    DEXA (modify frequency per FRAX score)  Completed       PHQ Scores 2/19/2018   PHQ2 Score 0   PHQ9 Score 0     Interpretation of Total Score DepressionSeverity: 1-4 = Minimal depression, 5-9 = Mild depression, 10-14 = Moderate depression, 15-19 = Moderately severe depression, 20-27 = Severe depression    Current Outpatient Medications   Medication Sig Dispense Refill    doxycycline hyclate (VIBRA-TABS) 100 MG tablet Take 1 tablet by mouth 2 times daily for 10 days 20 tablet 1    desvenlafaxine succinate (PRISTIQ) 50 MG TB24 extended release tablet take 1 tablet by mouth once daily 30 tablet 2    montelukast (SINGULAIR) 10 MG tablet Take 1 tablet by mouth nightly 90 tablet 1    dexlansoprazole (DEXILANT) 60 MG CPDR delayed release capsule Take 1 capsule by mouth daily 90 capsule 3    budesonide-formoterol (SYMBICORT) 160-4.5 MCG/ACT AERO Inhale 2 puffs into the lungs 2 times daily 30.6 g 3    ranitidine (ZANTAC) 150 MG tablet Take 1 tablet by mouth 2 times daily 180 tablet 1    ezetimibe (ZETIA) 10 MG tablet Take 1 tablet by mouth daily 90 tablet 1    traZODone (DESYREL) 150 MG tablet Take 1 tablet by mouth nightly 90 tablet 1    ondansetron (ZOFRAN) 4 MG tablet Take 1 tablet by mouth every 6-8 hours as needed for Nausea or Vomiting 60 tablet 1    albuterol sulfate HFA (VENTOLIN HFA) 108 (90 Base) MCG/ACT inhaler Inhale 2 puffs into the lungs every 4 hours as needed for Wheezing 1 Inhaler 2    SYMBICORT 160-4.5 MCG/ACT AERO   1    RA ALLERGY RELIEF 180 MG tablet   1    celecoxib (CELEBREX) 200 MG capsule Take 1 capsule by mouth daily 90 capsule 1     No current facility-administered medications for this visit. HPI:      Patient presents today for routine follow-up of major depression and multiple other chronic medical problems. She states overall she has been feeling fairly well. She did start a no added sugar diet several weeks ago but has not noticed much improvement. She will continue doing this in hopes that it will help her to feel better. She is taking Pristiq 50 mg once daily for her depression and she states her depression is doing well. Her mood is good. She still does have sleep disturbance but overall this is stable and not concerning. She does still have some fatigue which is likely related to some of her depression but this is chronic and unchanged. She does use trazodone for sleep. She does still continue to have a chronic headache with history of meningitis and Lyme disease.   She does continue to follow up with her neurologist, Dr. Freddy Hilario.  He does think that her chronic headaches are secondary to her neck pain from her history of cervical stenosis. They have been contemplating doing a lumbar puncture and she is ready to have this done. She does however have to get clearance from her hematologist and her hematologist is no longer in practice. She is awaiting a new consultation with a new hematologist and she does have a scheduled for Nessa 10 with Dr. Simeon Mittal. She did have 2 rounds of occipital injections with pain management, Dr. Tien Matos but this made her headaches worse at all. She did go back to follow up with him and he offered her spinal nerve stimulator but she has decided not to do this. She did have a psychiatric evaluation prior to deciding on the spinal nerve stimulator. She was tried on gabapentin but was unable to tolerate this due to side effects of fatigue and unsteadiness. She did try some medical marijuana and was given a high dosage of marijuana so she did not do well with this. She is looking into a dispensary in [facility] to see what they have to offer her. She does state her neck is about the same and she is continuing to get massage therapy once weekly. She has been trying acupuncture and she thinks this may be somewhat helpful. She has also been using a lasering technique because the accupuncturist used it and it was helpful. She does continue to follow-up with GI regarding her gastroparesis. She does have a history of chronic nausea and epigastric pain which she does tell me is significantly improved with erythromycin. She states her GI specialist has been treating her with 250 mg 4 times a day for 2 weeks and then off for 2 weeks. She states this has significantly helped and she denies any nausea or abdominal pain at this time. She did try Reglan in the past but she did get diarrhea so she stopped this.   She does continue to take dexilant in the morning and Nexium at breath. Current antihyperlipidemic treatment includes ezetimibe. The current treatment provides mild improvement of lipids. There are no compliance problems. Gastroesophageal Reflux   She reports no abdominal pain, no chest pain, no coughing, no nausea, no sore throat or no wheezing. This is a chronic problem. The current episode started more than 1 year ago. The problem occurs occasionally. The problem has been waxing and waning. The symptoms are aggravated by certain foods and stress. Pertinent negatives include no anemia, fatigue, melena, muscle weakness, orthopnea or weight loss. She has tried an antacid for the symptoms. The treatment provided mild relief. Review of Systems   Constitutional: Negative for appetite change, fatigue, fever and weight loss. HENT: Negative for congestion, ear pain, hearing loss, postnasal drip, rhinorrhea, sinus pressure, sneezing, sore throat and tinnitus. Eyes: Negative for photophobia, pain and redness. Respiratory: Negative for cough, chest tightness, shortness of breath, wheezing and stridor. Asthma controlled   Cardiovascular: Negative for chest pain, palpitations and leg swelling. Gastrointestinal: Negative for abdominal pain, blood in stool, constipation, diarrhea, melena, nausea and vomiting. Nausea and abdominal pain secondary to hiatal hernia, GERD and gastroparesis is well-controlled with dexilant, Nexium and erythromycin intermittently   Endocrine: Negative for polydipsia, polyphagia and polyuria. Genitourinary: Negative for decreased urine volume, dysuria, frequency, hematuria and urgency. Musculoskeletal: Positive for arthralgias (of the shoulders and knees), back pain and neck pain. Negative for myalgias and muscle weakness. Skin: Positive for wound (spider bite on the left ear). Negative for color change and rash. Allergic/Immunologic: Negative for immunocompromised state. Neurological: Positive for headaches.  Negative for dizziness, tremors, focal weakness, seizures, weakness, light-headedness and numbness. Hematological: Negative for adenopathy. Does not bruise/bleed easily. Psychiatric/Behavioral: Positive for dysphoric mood (improved with pristiq) and sleep disturbance (Stable with trazodone). The patient is not nervous/anxious. Objective:     /70 (Site: Right Upper Arm, Position: Sitting, Cuff Size: Medium Adult)   Temp 98.8 °F (37.1 °C) (Tympanic)   Resp 20   Wt 165 lb (74.8 kg)   Breastfeeding? No   BMI 28.32 kg/m²        Physical Exam   Constitutional: She appears well-developed and well-nourished. No distress. HENT:   Head: Normocephalic and atraumatic. Right Ear: External ear normal.   Left Ear: There is swelling (Over the entire helix) and tenderness. Nose: Nose normal.   Mouth/Throat: Oropharynx is clear and moist. No oropharyngeal exudate. There is significant erythema, swelling and tenderness over the entire helix of the left ear. There is mild erythema extending down the left side of the neck. There is erythema and swelling behind the ear and there is a swollen posterior regular lymph node that is mildly tender. There are 2 small fang bites on the posterior helix with some weeping of clear fluid and a superficial ulceration. Eyes: Pupils are equal, round, and reactive to light. EOM are normal. Right eye exhibits no discharge. Left eye exhibits no discharge. No scleral icterus. Neck: Normal range of motion. No JVD present. Muscular tenderness present. No thyromegaly present. Cardiovascular: Normal rate, regular rhythm and normal heart sounds. No murmur heard. Pulmonary/Chest: Effort normal and breath sounds normal. No respiratory distress. She has no wheezes. She has no rales. Abdominal: Soft. Bowel sounds are normal. She exhibits no mass. There is no hepatosplenomegaly. There is no tenderness. There is no guarding. Musculoskeletal: She exhibits no edema. Cervical back: She exhibits decreased range of motion and tenderness. She exhibits no pain and no spasm. Back:    Lymphadenopathy:     She has no cervical adenopathy. Neurological: She is alert. She has normal reflexes. No cranial nerve deficit. Coordination normal.   Skin: Skin is warm and dry. Capillary refill takes less than 2 seconds. No rash noted. She is not diaphoretic. No erythema. Psychiatric: Her speech is normal and behavior is normal. Thought content normal. She exhibits a depressed mood (better). Nursing note and vitals reviewed. Assessment:      Diagnosis Orders   1. Major depressive disorder with single episode, in partial remission ([facility] [address]     2. Sleep disturbance     3. Other fatigue     4. Chronic tension-type headache, intractable     5. History of meningitis     6. History of Lyme disease     7. Neck pain     8. Cervical stenosis of spine     9. Gastroparesis     10. Nausea     11. Epigastric pain     12. Hiatal hernia     13. Gastroesophageal reflux disease, esophagitis presence not specified     14. Hyperlipidemia, unspecified hyperlipidemia type     15. Chronic pain syndrome     16. Primary osteoarthritis of both shoulders     17. Primary osteoarthritis of both knees     18. Mild intermittent asthma without complication     19. 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 Spider bite wound, accidental or unintentional, initial encounter  methylPREDNISolone acetate (DEPO-MEDROL) injection 120 mg    doxycycline hyclate (VIBRA-TABS) 100 MG tablet          Plan:     Proceed with continue current medications  Stress relieving activities encouraged  Daily exercise and healthy diet encouraged  Labs at next visit   Recommend stretching exercises for neck and continue massage therapy  Continue to follow-up with her acupuncturist  Good sleep hygiene recommended and continue trazodone for sleep  Follow-up with neurology as scheduled  Follow-up with pain management as needed  Continue Dexilant, Nexium and erythromycin intermittently as prescribed by GI  Consider general surgery referral for evaluation of hiatal hernia if symptoms recur  Follow-up with Ortho as scheduled  Continue asthma medications  Follow-up with hematology as scheduled  Proceed with Depo-Medrol 120 mg injection ×1 now  Start doxycycline in order to prevent secondary infection of spider bite area  Shingles vaccine recommended  Pneumovax 23 recommended at next visit  Call with concerns        Stephanie Tan received counseling on the following healthy behaviors: nutrition, exercise and medication adherence  Reviewed prior labs and health maintenance  Continue current medications, diet and exercise. Discussed use, benefit, and side effects of prescribed medications. Barriers to medication compliance addressed. Patient given educational materials - see patient instructions  Was a self-tracking handout given in paper form or via [employer]? No    Requested Prescriptions     Signed Prescriptions Disp Refills    doxycycline hyclate (VIBRA-TABS) 100 MG tablet 20 tablet 1     Sig: Take 1 tablet by mouth 2 times daily for 10 days       All patient questions answered. Patient voiced understanding. Quality Measures    Body mass index is 28.32 kg/m². Elevated. Weight control planned discussed Healthy diet and regular exercise. BP: 128/70. Blood pressure is normal. Treatment plan consists of No treatment change needed. Fall Risk 5/9/2019 2/19/2018   2 or more falls in past year? no no   Fall with injury in past year? no no     The patient does not have a history of falls. I did , complete a risk assessment for falls.  A plan of care for falls No Treatment plan indicated    Lab Results   Component Value Date    LDLCHOLESTEROL 138 (H) 08/15/2018    (goal LDL reduction with dx if diabetes is 50% LDL reduction)    PHQ Scores 2/19/2018   PHQ2 Score 0   PHQ9 Score 0     Interpretation of Total Score Depression Severity: 1-4 = Minimal depression, 5-9 = Mild depression, 10-14 = Moderate depression, 15-19 = Moderately severe depression, 20-27 = Severe depression      Electronically signed by Gracie Robertson MD on 5/9/2019 at 3:12 PM

## 2019-05-13 ENCOUNTER — TELEPHONE (OUTPATIENT)
Dept: FAMILY MEDICINE CLINIC | Age: 69
End: 2019-05-13

## 2019-05-13 DIAGNOSIS — L30.9 DERMATITIS: Primary | ICD-10-CM

## 2019-05-13 NOTE — TELEPHONE ENCOUNTER
Patient complains of an itchy, red patch, 4 in in diameter on her neck just below the spider bite. Patient was treated for the spider bite 05/10/19. Patient asks if an RX can be called in, chandler of another visit. Please notify pt.

## 2019-05-15 RX ORDER — PREDNISONE 20 MG/1
40 TABLET ORAL DAILY
Qty: 10 TABLET | Refills: 0 | Status: SHIPPED | OUTPATIENT
Start: 2019-05-15 | End: 2019-05-20

## 2019-05-15 NOTE — TELEPHONE ENCOUNTER
Patient contacted office because she had a spider bit and was treated last week and over the weekend she noticed an area on her neck just below that was red, swollen, itchy, which had a rash like dots around it. Patient states that she started using 10 % Cortisone cream on it and it seemed to help as the swelling is down some and it is not as itchy, the patient is asking if another steroid could be called in for her to [facility] in [place]. Patient denies any fevers, heat to the area, red streaks or pain.   Please advise

## 2019-06-24 DIAGNOSIS — F32.4 MAJOR DEPRESSIVE DISORDER WITH SINGLE EPISODE, IN PARTIAL REMISSION (HCC): ICD-10-CM

## 2019-06-24 RX ORDER — DESVENLAFAXINE 50 MG/1
50 TABLET, EXTENDED RELEASE ORAL DAILY
Qty: 30 TABLET | Refills: 5 | Status: SHIPPED | OUTPATIENT
Start: 2019-06-24 | End: 2019-12-27

## 2019-06-24 NOTE — TELEPHONE ENCOUNTER
Last O 5-9-19  RTO in 3 mo  Last refill 3-20-19 #30 with 2 refills  Health Maintenance   Topic Date Due    Shingles Vaccine (2 of 3) 03/20/2013    Pneumococcal 65+ years Vaccine (2 of 2 - PPSV23) 02/19/2019    Annual Wellness Visit (AWV)  02/25/2019    Hepatitis C screen  09/26/2017 (Originally 1950)    Breast cancer screen  08/07/2019    DTaP/Tdap/Td vaccine (2 - Td) 03/01/2021    Lipid screen  08/15/2023    Colon cancer screen colonoscopy  11/08/2028    DEXA (modify frequency per FRAX score)  Completed             (applicable per patient's age: Cancer Screenings, Depression Screening, Fall Risk Screening, Immunizations)    LDL Cholesterol (mg/dL)   Date Value   08/15/2018 138 (H)     AST (U/L)   Date Value   08/15/2018 29     ALT (U/L)   Date Value   08/15/2018 42 (H)     BUN (mg/dL)   Date Value   08/15/2018 18      (goal A1C is < 7)   (goal LDL is <100) need 30-50% reduction from baseline     BP Readings from Last 3 Encounters:   05/09/19 128/70   01/23/19 130/76   11/12/18 136/88    (goal /80)      All Future Testing planned in [facility]:  Lab Frequency Next Occurrence   Hepatic Function Panel Once 09/20/2018   Lipid, Fasting Once 08/20/2019   Comprehensive Metabolic Panel Once 58/18/2365       Next Visit Date:  Future Appointments   Date Time Provider Petey Moon   8/21/2019  8:40 AM MD Page Lara [facility]            Patient Active Problem List:     Homozygous Factor V Leiden mutation ([facility] [address]     Mild intermittent asthma without complication     Delta storage pool disease [facility])     Cervical stenosis of spine     Primary osteoarthritis of both shoulders     Primary osteoarthritis of both knees     Hyperlipidemia     History of meningitis     Gastroesophageal reflux disease     Hiatal hernia     Colon adenoma     Major depressive disorder with single episode, in partial remission ([facility] [address]     Anxiety     Sleep disturbance     History of Lyme disease     Chronic pain syndrome

## 2019-06-26 ENCOUNTER — OFFICE VISIT (OUTPATIENT)
Dept: FAMILY MEDICINE CLINIC | Age: 69
End: 2019-06-26
Payer: MEDICARE

## 2019-06-26 VITALS
RESPIRATION RATE: 14 BRPM | DIASTOLIC BLOOD PRESSURE: 84 MMHG | WEIGHT: 167 LBS | HEART RATE: 72 BPM | SYSTOLIC BLOOD PRESSURE: 132 MMHG | TEMPERATURE: 98.7 F | BODY MASS INDEX: 28.67 KG/M2

## 2019-06-26 DIAGNOSIS — R23.3 PETECHIAL RASH: Primary | ICD-10-CM

## 2019-06-26 PROCEDURE — G8400 PT W/DXA NO RESULTS DOC: HCPCS | Performed by: NURSE PRACTITIONER

## 2019-06-26 PROCEDURE — 3017F COLORECTAL CA SCREEN DOC REV: CPT | Performed by: NURSE PRACTITIONER

## 2019-06-26 PROCEDURE — 1090F PRES/ABSN URINE INCON ASSESS: CPT | Performed by: NURSE PRACTITIONER

## 2019-06-26 PROCEDURE — 4040F PNEUMOC VAC/ADMIN/RCVD: CPT | Performed by: NURSE PRACTITIONER

## 2019-06-26 PROCEDURE — 99213 OFFICE O/P EST LOW 20 MIN: CPT | Performed by: NURSE PRACTITIONER

## 2019-06-26 PROCEDURE — G8427 DOCREV CUR MEDS BY ELIG CLIN: HCPCS | Performed by: NURSE PRACTITIONER

## 2019-06-26 PROCEDURE — G8417 CALC BMI ABV UP PARAM F/U: HCPCS | Performed by: NURSE PRACTITIONER

## 2019-06-26 PROCEDURE — 1123F ACP DISCUSS/DSCN MKR DOCD: CPT | Performed by: NURSE PRACTITIONER

## 2019-06-26 PROCEDURE — 1036F TOBACCO NON-USER: CPT | Performed by: NURSE PRACTITIONER

## 2019-06-26 RX ORDER — DOXYCYCLINE HYCLATE 100 MG/1
100 CAPSULE ORAL 2 TIMES DAILY
Qty: 28 CAPSULE | Refills: 0 | COMMUNITY
Start: 2019-06-26 | End: 2019-07-10

## 2019-06-26 ASSESSMENT — ENCOUNTER SYMPTOMS
COUGH: 1
CHEST TIGHTNESS: 0
NAUSEA: 0
SHORTNESS OF BREATH: 0
EYE PAIN: 0
EYE REDNESS: 0
SORE THROAT: 0
BACK PAIN: 0
RHINORRHEA: 0
WHEEZING: 0
ABDOMINAL PAIN: 0
SINUS PRESSURE: 0
BLOOD IN STOOL: 0
DIARRHEA: 0
VOMITING: 0
EYE DISCHARGE: 0
ABDOMINAL DISTENTION: 0

## 2019-06-26 NOTE — PROGRESS NOTES
Subjective:      Patient ID: Jon Cooper is a 79 y.o. female. Visit Information    Have you changed or started any medications since your last visit including any over-the-counter medicines, vitamins, or herbal medicines? no   Are you having any side effects from any of your medications? -  no  Have you stopped taking any of your medications? Is so, why? -  no    Have you seen any other physician or provider since your last visit? No  Have you had any other diagnostic tests since your last visit? No  Have you been seen in the emergency room and/or had an admission to a hospital since we last saw you? No  Have you had your routine dental cleaning in the past 6 months? no    Have you activated your [employer] account? If not, what are your barriers?  Yes     Patient Care Team:  Andrew Salcido MD as PCP - General (Internal Medicine)  Andrew Salcido MD as PCP - [facility]    Medical History Review  Past Medical, Family, and Social History reviewed and does contribute to the patient presenting condition    Health Maintenance   Topic Date Due    Shingles Vaccine (2 of 3) 03/20/2013    Pneumococcal 65+ years Vaccine (2 of 2 - PPSV23) 02/19/2019    Annual Wellness Visit (AWV)  02/25/2019    Hepatitis C screen  09/26/2019 (Originally 1950)    Breast cancer screen  08/07/2019    DTaP/Tdap/Td vaccine (2 - Td) 03/01/2021    Lipid screen  08/15/2023    Colon cancer screen colonoscopy  11/08/2028    DEXA (modify frequency per FRAX score)  Completed       PHQ Scores 2/19/2018   PHQ2 Score 0   PHQ9 Score 0     Interpretation of Total Score DepressionSeverity: 1-4 = Minimal depression, 5-9 = Mild depression, 10-14 = Moderate depression, 15-19 = Moderately severe depression, 20-27 = Severe depression    Current Outpatient Medications   Medication Sig Dispense Refill    doxycycline hyclate (VIBRAMYCIN) 100 MG capsule Take 1 capsule by mouth 2 times daily 28 capsule 0    desvenlafaxine succinate (PRISTIQ) 50 MG TB24 extended release tablet Take 1 tablet by mouth daily 30 tablet 5    montelukast (SINGULAIR) 10 MG tablet Take 1 tablet by mouth nightly 90 tablet 1    dexlansoprazole (DEXILANT) 60 MG CPDR delayed release capsule Take 1 capsule by mouth daily 90 capsule 3    budesonide-formoterol (SYMBICORT) 160-4.5 MCG/ACT AERO Inhale 2 puffs into the lungs 2 times daily 30.6 g 3    ranitidine (ZANTAC) 150 MG tablet Take 1 tablet by mouth 2 times daily 180 tablet 1    ezetimibe (ZETIA) 10 MG tablet Take 1 tablet by mouth daily 90 tablet 1    celecoxib (CELEBREX) 200 MG capsule Take 1 capsule by mouth daily 90 capsule 1    traZODone (DESYREL) 150 MG tablet Take 1 tablet by mouth nightly 90 tablet 1    ondansetron (ZOFRAN) 4 MG tablet Take 1 tablet by mouth every 6-8 hours as needed for Nausea or Vomiting 60 tablet 1    albuterol sulfate HFA (VENTOLIN HFA) 108 (90 Base) MCG/ACT inhaler Inhale 2 puffs into the lungs every 4 hours as needed for Wheezing 1 Inhaler 2    SYMBICORT 160-4.5 MCG/ACT AERO   1    RA ALLERGY RELIEF 180 MG tablet   1     No current facility-administered medications for this visit. HPI  [place] time. Had lyme disease 2 summers ago. This morning developed non banchable petechial rash on left lower leg. Developing some on right lower leg. No pain / itching. No increase in body aches. No fevers. Does have burning sensation. Has had ticks crawling on her but not attached. Review of Systems   Constitutional: Positive for fatigue. Negative for activity change, appetite change and fever. HENT: Negative for congestion, ear discharge, ear pain, postnasal drip, rhinorrhea, sinus pressure and sore throat. Eyes: Negative for pain, discharge, redness and visual disturbance. Respiratory: Positive for cough (chronic - history of asthma / GERD). Negative for chest tightness, shortness of breath and wheezing.          History of asthma - fall is worse   Cardiovascular: Negative for chest pain, palpitations and leg swelling. Gastrointestinal: Negative for abdominal distention, abdominal pain, blood in stool, diarrhea, nausea and vomiting. Endocrine: Negative for polydipsia, polyphagia and polyuria. Genitourinary: Negative for decreased urine volume, dysuria, flank pain and hematuria. Musculoskeletal: Positive for arthralgias (multiple joints). Negative for back pain, joint swelling, myalgias and neck stiffness. Skin: Positive for rash (petechial rash). Neurological: Positive for headaches (chronic daily for last year). Negative for dizziness, syncope, weakness and light-headedness. Hematological: Negative for adenopathy. Psychiatric/Behavioral: Negative for agitation, decreased concentration and sleep disturbance. The patient is not nervous/anxious and is not hyperactive. Objective:     /84 (Site: Right Upper Arm, Position: Sitting, Cuff Size: Medium Adult)   Pulse 72   Temp 98.7 °F (37.1 °C) (Tympanic)   Resp 14   Wt 167 lb (75.8 kg)   BMI 28.67 kg/m²      Physical Exam   Constitutional: She is oriented to person, place, and time. Vital signs are normal. She is cooperative. She does not appear ill. No distress. HENT:   Head: Atraumatic. Right Ear: External ear normal.   Left Ear: External ear normal.   Mouth/Throat: Mucous membranes are normal.   Neck: Normal range of motion. Neck supple. Cardiovascular: Normal rate, regular rhythm, intact distal pulses and normal pulses. Pulses:       Radial pulses are 2+ on the right side, and 2+ on the left side. Dorsalis pedis pulses are 2+ on the right side, and 2+ on the left side. Posterior tibial pulses are 2+ on the right side, and 2+ on the left side. Pulmonary/Chest: Effort normal and breath sounds normal. She has no wheezes. She has no rhonchi. Abdominal: Soft. Bowel sounds are normal. There is no tenderness.    Neurological: She is alert and oriented to person, place, and time. Skin: Petechiae and rash noted. Assessment:      Diagnosis Orders   1. Petechial rash  Comprehensive Metabolic Panel    CBC Auto Differential    Lyme Ab    Protime-INR    doxycycline hyclate (VIBRAMYCIN) 100 MG capsule       Plan:     Labs today as ordered  Seen in conjunction with Dr Ari Villanueva   Start doxycyline - she has already started, recommend 100 gm twice daily for 2 weeks   May need longer course depending on labs results  Monitor for worsening symptoms   Call with concerns   Return if symptoms worsen or fail to improve. Toshia received counseling on the following healthy behaviors: medication adherence  Reviewed prior labs and health maintenance. Continue current medications, diet and exercise. Discussed use, benefit, and side effects of prescribed medications. Barriers to medication compliance addressed. Patient given educational materials - see patient instructions. All patient questions answered. Patient voiced understanding.            Electronically signed by ROBERTO Sheppard CNP on 6/26/2019 at 4:58 PM

## 2019-06-27 ENCOUNTER — HOSPITAL ENCOUNTER (OUTPATIENT)
Age: 69
Setting detail: SPECIMEN
Discharge: HOME | End: 2019-06-27
Payer: MEDICARE

## 2019-06-27 DIAGNOSIS — R23.3 PETECHIAL RASH: ICD-10-CM

## 2019-06-27 LAB
ABSOLUTE EOS #: 0.16 K/UL (ref 0–0.44)
ABSOLUTE IMMATURE GRANULOCYTE: <0.03 K/UL (ref 0–0.3)
ABSOLUTE LYMPH #: 1.8 K/UL (ref 1.1–3.7)
ABSOLUTE MONO #: 0.62 K/UL (ref 0.1–1.2)
ALBUMIN SERPL-MCNC: 4.2 G/DL (ref 3.5–5.2)
ALBUMIN/GLOBULIN RATIO: 1.8 (ref 1–2.5)
ALP BLD-CCNC: 68 U/L (ref 35–104)
ALT SERPL-CCNC: 21 U/L (ref 5–33)
ANION GAP SERPL CALCULATED.3IONS-SCNC: 11 MMOL/L (ref 9–17)
AST SERPL-CCNC: 17 U/L
BASOPHILS # BLD: 1 % (ref 0–2)
BASOPHILS ABSOLUTE: 0.06 K/UL (ref 0–0.2)
BILIRUB SERPL-MCNC: <0.1 MG/DL (ref 0.3–1.2)
BUN BLDV-MCNC: 18 MG/DL (ref 8–23)
BUN/CREAT BLD: ABNORMAL (ref 9–20)
CALCIUM SERPL-MCNC: 9.2 MG/DL (ref 8.6–10.4)
CHLORIDE BLD-SCNC: 103 MMOL/L (ref 98–107)
CO2: 25 MMOL/L (ref 20–31)
CREAT SERPL-MCNC: 0.58 MG/DL (ref 0.5–0.9)
DIFFERENTIAL TYPE: NORMAL
EOSINOPHILS RELATIVE PERCENT: 3 % (ref 1–4)
GFR AFRICAN AMERICAN: >60 ML/MIN
GFR NON-AFRICAN AMERICAN: >60 ML/MIN
GFR SERPL CREATININE-BSD FRML MDRD: ABNORMAL ML/MIN/{1.73_M2}
GFR SERPL CREATININE-BSD FRML MDRD: ABNORMAL ML/MIN/{1.73_M2}
GLUCOSE BLD-MCNC: 96 MG/DL (ref 70–99)
HCT VFR BLD CALC: 40 % (ref 36.3–47.1)
HEMOGLOBIN: 12.8 G/DL (ref 11.9–15.1)
IMMATURE GRANULOCYTES: 0 %
INR BLD: 1
LYMPHOCYTES # BLD: 31 % (ref 24–43)
MCH RBC QN AUTO: 30 PG (ref 25.2–33.5)
MCHC RBC AUTO-ENTMCNC: 32 G/DL (ref 28.4–34.8)
MCV RBC AUTO: 93.7 FL (ref 82.6–102.9)
MONOCYTES # BLD: 11 % (ref 3–12)
NRBC AUTOMATED: 0 PER 100 WBC
PDW BLD-RTO: 13.7 % (ref 11.8–14.4)
PLATELET # BLD: 248 K/UL (ref 138–453)
PLATELET ESTIMATE: NORMAL
PMV BLD AUTO: 8.5 FL (ref 8.1–13.5)
POTASSIUM SERPL-SCNC: 4 MMOL/L (ref 3.7–5.3)
PROTHROMBIN TIME: 10.2 SEC (ref 9–12)
RBC # BLD: 4.27 M/UL (ref 3.95–5.11)
RBC # BLD: NORMAL 10*6/UL
SEG NEUTROPHILS: 54 % (ref 36–65)
SEGMENTED NEUTROPHILS ABSOLUTE COUNT: 3.2 K/UL (ref 1.5–8.1)
SODIUM BLD-SCNC: 139 MMOL/L (ref 135–144)
TOTAL PROTEIN: 6.6 G/DL (ref 6.4–8.3)
WBC # BLD: 5.9 K/UL (ref 3.5–11.3)
WBC # BLD: NORMAL 10*3/UL

## 2019-06-30 LAB
MISCELLANEOUS LAB TEST RESULT: NORMAL
TEST NAME: NORMAL

## 2019-07-02 LAB — LYME ANTIBODY: 0.12

## 2019-07-10 NOTE — PROGRESS NOTES
After talking with Merlinda Plaster, patient phoned in and stated that patient is not going to take the Doxycyclene that was prescribed for the patient at the end of June 2019.

## 2019-08-15 DIAGNOSIS — M19.012 PRIMARY OSTEOARTHRITIS OF BOTH SHOULDERS: ICD-10-CM

## 2019-08-15 DIAGNOSIS — M17.0 PRIMARY OSTEOARTHRITIS OF BOTH KNEES: ICD-10-CM

## 2019-08-15 DIAGNOSIS — M19.011 PRIMARY OSTEOARTHRITIS OF BOTH SHOULDERS: ICD-10-CM

## 2019-08-19 RX ORDER — CELECOXIB 200 MG/1
200 CAPSULE ORAL DAILY
Qty: 90 CAPSULE | Refills: 1 | Status: SHIPPED | OUTPATIENT
Start: 2019-08-19 | End: 2020-03-04

## 2019-08-19 NOTE — TELEPHONE ENCOUNTER
LOV 5-9-19  LRF [birth year]    Health Maintenance   Topic Date Due    Annual Wellness Visit (AWV)  11/05/2015    Pneumococcal 65+ years Vaccine (2 of 2 - PPSV23) 02/19/2019    Breast cancer screen  08/07/2019    Hepatitis C screen  09/26/2019 (Originally 1950)    Shingles Vaccine (3 of 3) 09/23/2019    DTaP/Tdap/Td vaccine (2 - Td) 03/01/2021    Lipid screen  08/15/2023    Colon cancer screen colonoscopy  11/08/2028    DEXA (modify frequency per FRAX score)  Completed             (applicable per patient's age: Cancer Screenings, Depression Screening, Fall Risk Screening, Immunizations)    LDL Cholesterol (mg/dL)   Date Value   08/15/2018 138 (H)     AST (U/L)   Date Value   06/27/2019 17     ALT (U/L)   Date Value   06/27/2019 21     BUN (mg/dL)   Date Value   06/27/2019 18      (goal A1C is < 7)   (goal LDL is <100) need 30-50% reduction from baseline     BP Readings from Last 3 Encounters:   06/26/19 132/84   05/09/19 128/70   01/23/19 130/76    (goal /80)      All Future Testing planned in [facility]:  Lab Frequency Next Occurrence   Hepatic Function Panel Once 09/20/2018   Lipid, Fasting Once 08/20/2019   Comprehensive Metabolic Panel Once 86/31/4555       Next Visit Date:  Future Appointments   Date Time Provider Petey Moon   10/16/2019  8:40 AM MD Marin Araujo [facility]            Patient Active Problem List:     Homozygous Factor V Leiden mutation ([facility] [address]     Mild intermittent asthma without complication     Delta storage pool disease [facility])     Cervical stenosis of spine     Primary osteoarthritis of both shoulders     Primary osteoarthritis of both knees     Hyperlipidemia     History of meningitis     Gastroesophageal reflux disease     Hiatal hernia     Colon adenoma     Major depressive disorder with single episode, in partial remission (HCC)     Anxiety     Sleep disturbance     History of Lyme disease     Chronic pain syndrome

## 2019-08-29 DIAGNOSIS — E78.5 HYPERLIPIDEMIA, UNSPECIFIED HYPERLIPIDEMIA TYPE: ICD-10-CM

## 2019-08-30 RX ORDER — EZETIMIBE 10 MG/1
10 TABLET ORAL DAILY
Qty: 90 TABLET | Refills: 1 | Status: SHIPPED | OUTPATIENT
Start: 2019-08-30 | End: 2020-03-18

## 2019-10-18 ENCOUNTER — TELEPHONE (OUTPATIENT)
Dept: FAMILY MEDICINE CLINIC | Age: 69
End: 2019-10-18

## 2019-11-07 DIAGNOSIS — J45.20 MILD INTERMITTENT ASTHMA WITHOUT COMPLICATION: ICD-10-CM

## 2019-11-07 RX ORDER — MONTELUKAST SODIUM 10 MG/1
10 TABLET ORAL NIGHTLY
Qty: 90 TABLET | Refills: 1 | Status: SHIPPED | OUTPATIENT
Start: 2019-11-07 | End: 2020-05-22

## 2019-11-18 ENCOUNTER — TELEPHONE (OUTPATIENT)
Dept: FAMILY MEDICINE CLINIC | Age: 69
End: 2019-11-18

## 2019-11-18 DIAGNOSIS — J45.21 MILD INTERMITTENT ASTHMA WITH ACUTE EXACERBATION: ICD-10-CM

## 2019-11-18 DIAGNOSIS — J01.90 ACUTE NON-RECURRENT SINUSITIS, UNSPECIFIED LOCATION: Primary | ICD-10-CM

## 2019-11-19 RX ORDER — CEFDINIR 300 MG/1
300 CAPSULE ORAL 2 TIMES DAILY
Qty: 20 CAPSULE | Refills: 0 | Status: SHIPPED | OUTPATIENT
Start: 2019-11-19 | End: 2019-11-29

## 2019-11-19 RX ORDER — ALBUTEROL SULFATE 90 UG/1
2 AEROSOL, METERED RESPIRATORY (INHALATION) EVERY 4 HOURS PRN
Qty: 1 INHALER | Refills: 2 | Status: SHIPPED | OUTPATIENT
Start: 2019-11-19 | End: 2021-06-21

## 2019-12-04 ENCOUNTER — OFFICE VISIT (OUTPATIENT)
Dept: FAMILY MEDICINE CLINIC | Age: 69
End: 2019-12-04
Payer: MEDICARE

## 2019-12-04 ENCOUNTER — HOSPITAL ENCOUNTER (OUTPATIENT)
Age: 69
Setting detail: SPECIMEN
Discharge: HOME | End: 2019-12-04
Payer: MEDICARE

## 2019-12-04 VITALS
HEART RATE: 90 BPM | DIASTOLIC BLOOD PRESSURE: 80 MMHG | WEIGHT: 178 LBS | BODY MASS INDEX: 30.39 KG/M2 | HEIGHT: 64 IN | SYSTOLIC BLOOD PRESSURE: 130 MMHG | RESPIRATION RATE: 16 BRPM | TEMPERATURE: 98.4 F

## 2019-12-04 DIAGNOSIS — M19.012 PRIMARY OSTEOARTHRITIS OF BOTH SHOULDERS: ICD-10-CM

## 2019-12-04 DIAGNOSIS — R05.9 COUGH: ICD-10-CM

## 2019-12-04 DIAGNOSIS — G47.9 SLEEP DISTURBANCE: ICD-10-CM

## 2019-12-04 DIAGNOSIS — R01.1 HEART MURMUR: ICD-10-CM

## 2019-12-04 DIAGNOSIS — M48.02 CERVICAL STENOSIS OF SPINE: ICD-10-CM

## 2019-12-04 DIAGNOSIS — M54.2 NECK PAIN: ICD-10-CM

## 2019-12-04 DIAGNOSIS — R10.13 EPIGASTRIC PAIN: ICD-10-CM

## 2019-12-04 DIAGNOSIS — J45.21 MILD INTERMITTENT ASTHMA WITH ACUTE EXACERBATION: ICD-10-CM

## 2019-12-04 DIAGNOSIS — M17.0 PRIMARY OSTEOARTHRITIS OF BOTH KNEES: ICD-10-CM

## 2019-12-04 DIAGNOSIS — Z86.61 HISTORY OF MENINGITIS: ICD-10-CM

## 2019-12-04 DIAGNOSIS — R06.02 SHORTNESS OF BREATH: ICD-10-CM

## 2019-12-04 DIAGNOSIS — K31.84 GASTROPARESIS: ICD-10-CM

## 2019-12-04 DIAGNOSIS — Z11.59 NEED FOR HEPATITIS C SCREENING TEST: ICD-10-CM

## 2019-12-04 DIAGNOSIS — Z23 NEED FOR PNEUMOCOCCAL VACCINATION: ICD-10-CM

## 2019-12-04 DIAGNOSIS — E55.9 VITAMIN D INSUFFICIENCY: ICD-10-CM

## 2019-12-04 DIAGNOSIS — R11.0 NAUSEA: ICD-10-CM

## 2019-12-04 DIAGNOSIS — Z86.19 HISTORY OF LYME DISEASE: ICD-10-CM

## 2019-12-04 DIAGNOSIS — F32.4 MAJOR DEPRESSIVE DISORDER WITH SINGLE EPISODE, IN PARTIAL REMISSION (HCC): Primary | ICD-10-CM

## 2019-12-04 DIAGNOSIS — R53.83 OTHER FATIGUE: ICD-10-CM

## 2019-12-04 DIAGNOSIS — E78.5 HYPERLIPIDEMIA, UNSPECIFIED HYPERLIPIDEMIA TYPE: ICD-10-CM

## 2019-12-04 DIAGNOSIS — K44.9 HIATAL HERNIA: ICD-10-CM

## 2019-12-04 DIAGNOSIS — G44.221 CHRONIC TENSION-TYPE HEADACHE, INTRACTABLE: ICD-10-CM

## 2019-12-04 DIAGNOSIS — K21.9 GASTROESOPHAGEAL REFLUX DISEASE, ESOPHAGITIS PRESENCE NOT SPECIFIED: ICD-10-CM

## 2019-12-04 DIAGNOSIS — M19.011 PRIMARY OSTEOARTHRITIS OF BOTH SHOULDERS: ICD-10-CM

## 2019-12-04 LAB
BNP INTERPRETATION: NORMAL
D-DIMER QUANTITATIVE: 0.55 MG/L FEU
PRO-BNP: 60 PG/ML

## 2019-12-04 PROCEDURE — 1036F TOBACCO NON-USER: CPT | Performed by: PEDIATRICS

## 2019-12-04 PROCEDURE — 90732 PPSV23 VACC 2 YRS+ SUBQ/IM: CPT | Performed by: PEDIATRICS

## 2019-12-04 PROCEDURE — G8484 FLU IMMUNIZE NO ADMIN: HCPCS | Performed by: PEDIATRICS

## 2019-12-04 PROCEDURE — 1090F PRES/ABSN URINE INCON ASSESS: CPT | Performed by: PEDIATRICS

## 2019-12-04 PROCEDURE — G8417 CALC BMI ABV UP PARAM F/U: HCPCS | Performed by: PEDIATRICS

## 2019-12-04 PROCEDURE — 99215 OFFICE O/P EST HI 40 MIN: CPT | Performed by: PEDIATRICS

## 2019-12-04 PROCEDURE — G8400 PT W/DXA NO RESULTS DOC: HCPCS | Performed by: PEDIATRICS

## 2019-12-04 PROCEDURE — 4040F PNEUMOC VAC/ADMIN/RCVD: CPT | Performed by: PEDIATRICS

## 2019-12-04 PROCEDURE — G0009 ADMIN PNEUMOCOCCAL VACCINE: HCPCS | Performed by: PEDIATRICS

## 2019-12-04 PROCEDURE — G8427 DOCREV CUR MEDS BY ELIG CLIN: HCPCS | Performed by: PEDIATRICS

## 2019-12-04 PROCEDURE — 3017F COLORECTAL CA SCREEN DOC REV: CPT | Performed by: PEDIATRICS

## 2019-12-04 PROCEDURE — 1123F ACP DISCUSS/DSCN MKR DOCD: CPT | Performed by: PEDIATRICS

## 2019-12-04 RX ORDER — ERYTHROMYCIN 250 MG/1
250 TABLET, COATED ORAL 4 TIMES DAILY
Qty: 120 TABLET | Refills: 0 | Status: SHIPPED | OUTPATIENT
Start: 2019-12-04 | End: 2020-11-06

## 2019-12-04 RX ORDER — BENZONATATE 100 MG/1
200 CAPSULE ORAL 3 TIMES DAILY PRN
Qty: 60 CAPSULE | Refills: 0 | Status: SHIPPED | OUTPATIENT
Start: 2019-12-04 | End: 2019-12-14

## 2019-12-04 RX ORDER — DEXLANSOPRAZOLE 60 MG/1
60 CAPSULE, DELAYED RELEASE ORAL DAILY
Qty: 90 CAPSULE | Refills: 3 | Status: SHIPPED | OUTPATIENT
Start: 2019-12-04 | End: 2021-02-15 | Stop reason: SDUPTHER

## 2019-12-04 RX ORDER — FLUTICASONE FUROATE AND VILANTEROL 200; 25 UG/1; UG/1
1 POWDER RESPIRATORY (INHALATION) DAILY
Qty: 2 EACH | Refills: 0 | COMMUNITY
Start: 2019-12-04 | End: 2021-02-10

## 2019-12-05 ENCOUNTER — HOSPITAL ENCOUNTER (OUTPATIENT)
Dept: CT IMAGING | Facility: CLINIC | Age: 69
Discharge: HOME | End: 2019-12-07
Payer: MEDICARE

## 2019-12-05 ENCOUNTER — TELEPHONE (OUTPATIENT)
Dept: FAMILY MEDICINE CLINIC | Age: 69
End: 2019-12-05

## 2019-12-05 ENCOUNTER — HOSPITAL ENCOUNTER (OUTPATIENT)
Dept: GENERAL RADIOLOGY | Facility: CLINIC | Age: 69
Discharge: HOME | End: 2019-12-07
Payer: MEDICARE

## 2019-12-05 ENCOUNTER — HOSPITAL ENCOUNTER (OUTPATIENT)
Facility: CLINIC | Age: 69
Discharge: HOME | End: 2019-12-07
Payer: MEDICARE

## 2019-12-05 DIAGNOSIS — R05.9 COUGH: Primary | ICD-10-CM

## 2019-12-05 DIAGNOSIS — R06.02 SHORTNESS OF BREATH: ICD-10-CM

## 2019-12-05 DIAGNOSIS — R79.89 ELEVATED D-DIMER: ICD-10-CM

## 2019-12-05 DIAGNOSIS — J45.21 MILD INTERMITTENT ASTHMA WITH ACUTE EXACERBATION: ICD-10-CM

## 2019-12-05 DIAGNOSIS — R05.9 COUGH: ICD-10-CM

## 2019-12-05 PROCEDURE — 2580000003 HC RX 258: Performed by: PEDIATRICS

## 2019-12-05 PROCEDURE — 6360000004 HC RX CONTRAST MEDICATION: Performed by: PEDIATRICS

## 2019-12-05 PROCEDURE — 71046 X-RAY EXAM CHEST 2 VIEWS: CPT

## 2019-12-05 PROCEDURE — 71260 CT THORAX DX C+: CPT

## 2019-12-05 RX ORDER — 0.9 % SODIUM CHLORIDE 0.9 %
70 INTRAVENOUS SOLUTION INTRAVENOUS ONCE
Status: COMPLETED | OUTPATIENT
Start: 2019-12-05 | End: 2019-12-05

## 2019-12-05 RX ORDER — SODIUM CHLORIDE 0.9 % (FLUSH) 0.9 %
10 SYRINGE (ML) INJECTION PRN
Status: DISCONTINUED | OUTPATIENT
Start: 2019-12-05 | End: 2019-12-08 | Stop reason: HOSPADM

## 2019-12-05 RX ORDER — AZITHROMYCIN 250 MG/1
TABLET, FILM COATED ORAL
Qty: 6 TABLET | Refills: 0 | Status: SHIPPED | OUTPATIENT
Start: 2019-12-05 | End: 2019-12-15

## 2019-12-05 RX ADMIN — IOPAMIDOL 80 ML: 755 INJECTION, SOLUTION INTRAVENOUS at 14:18

## 2019-12-05 RX ADMIN — Medication 10 ML: at 14:19

## 2019-12-05 RX ADMIN — SODIUM CHLORIDE 70 ML: 9 INJECTION, SOLUTION INTRAVENOUS at 14:18

## 2019-12-16 ASSESSMENT — ENCOUNTER SYMPTOMS
ABDOMINAL PAIN: 0
PHOTOPHOBIA: 0
COLOR CHANGE: 0
RHINORRHEA: 0
STRIDOR: 0
DIARRHEA: 0
VOMITING: 0
SORE THROAT: 0
NAUSEA: 0
SHORTNESS OF BREATH: 1
EYE REDNESS: 0
BLOOD IN STOOL: 0
WHEEZING: 1
EYE PAIN: 0
CONSTIPATION: 0
SINUS PRESSURE: 0
COUGH: 1
BACK PAIN: 1
CHEST TIGHTNESS: 1

## 2019-12-17 ENCOUNTER — TELEPHONE (OUTPATIENT)
Dept: FAMILY MEDICINE CLINIC | Age: 69
End: 2019-12-17

## 2019-12-17 DIAGNOSIS — M17.0 PRIMARY OSTEOARTHRITIS OF BOTH KNEES: ICD-10-CM

## 2019-12-17 DIAGNOSIS — M19.011 PRIMARY OSTEOARTHRITIS OF BOTH SHOULDERS: ICD-10-CM

## 2019-12-17 DIAGNOSIS — R06.02 SHORTNESS OF BREATH: ICD-10-CM

## 2019-12-17 DIAGNOSIS — M19.012 PRIMARY OSTEOARTHRITIS OF BOTH SHOULDERS: ICD-10-CM

## 2019-12-17 DIAGNOSIS — R05.9 COUGH: Primary | ICD-10-CM

## 2019-12-18 LAB
ALBUMIN SERPL-MCNC: 4.2 G/DL
ALP BLD-CCNC: 75 U/L
ALT SERPL-CCNC: 29 U/L
ANION GAP SERPL CALCULATED.3IONS-SCNC: 9 MMOL/L
ANTIBODY: NON REACTIVE
AST SERPL-CCNC: 18 U/L
BASOPHILS ABSOLUTE: NORMAL
BASOPHILS RELATIVE PERCENT: NORMAL
BILIRUB SERPL-MCNC: 0.5 MG/DL (ref 0.1–1.4)
BUN BLDV-MCNC: 24 MG/DL
CALCIUM SERPL-MCNC: 9.4 MG/DL
CHLORIDE BLD-SCNC: 106 MMOL/L
CHOLESTEROL, TOTAL: 268 MG/DL
CHOLESTEROL/HDL RATIO: 4.2
CO2: 29 MMOL/L
CREAT SERPL-MCNC: 0.79 MG/DL
EOSINOPHILS ABSOLUTE: NORMAL
EOSINOPHILS RELATIVE PERCENT: NORMAL
GFR CALCULATED: >60
GLUCOSE BLD-MCNC: 94 MG/DL
HCT VFR BLD CALC: 40.7 % (ref 36–46)
HDLC SERPL-MCNC: 64 MG/DL (ref 35–70)
HEMOGLOBIN: 13.7 G/DL (ref 12–16)
LDL CHOLESTEROL CALCULATED: 175 MG/DL (ref 0–160)
LEFT VENTRICULAR EJECTION FRACTION HIGH VALUE: 65 %
LEFT VENTRICULAR EJECTION FRACTION MODE: NORMAL
LV EF: 60 %
LYMPHOCYTES ABSOLUTE: NORMAL
LYMPHOCYTES RELATIVE PERCENT: NORMAL
MCH RBC QN AUTO: 30.7 PG
MCHC RBC AUTO-ENTMCNC: 33.7 G/DL
MCV RBC AUTO: 91 FL
MONOCYTES ABSOLUTE: NORMAL
MONOCYTES RELATIVE PERCENT: NORMAL
NEUTROPHILS ABSOLUTE: NORMAL
NEUTROPHILS RELATIVE PERCENT: NORMAL
PLATELET # BLD: 254 K/ΜL
PMV BLD AUTO: 6.3 FL
POTASSIUM SERPL-SCNC: 4.2 MMOL/L
RBC # BLD: 4.47 10^6/ΜL
SODIUM BLD-SCNC: 144 MMOL/L
TOTAL PROTEIN: 6.7
TRIGL SERPL-MCNC: 146 MG/DL
TSH SERPL DL<=0.05 MIU/L-ACNC: 0.92 UIU/ML
VITAMIN B-12: 376
VITAMIN D 25-HYDROXY: 57.9
VITAMIN D2, 25 HYDROXY: NORMAL
VITAMIN D3,25 HYDROXY: NORMAL
VLDLC SERPL CALC-MCNC: ABNORMAL MG/DL
WBC # BLD: 5.1 10^3/ML

## 2019-12-24 DIAGNOSIS — F32.4 MAJOR DEPRESSIVE DISORDER WITH SINGLE EPISODE, IN PARTIAL REMISSION (HCC): ICD-10-CM

## 2019-12-27 RX ORDER — DESVENLAFAXINE 50 MG/1
50 TABLET, EXTENDED RELEASE ORAL DAILY
Qty: 30 TABLET | Refills: 5 | Status: SHIPPED | OUTPATIENT
Start: 2019-12-27 | End: 2020-06-23

## 2020-01-06 ENCOUNTER — TELEPHONE (OUTPATIENT)
Dept: FAMILY MEDICINE CLINIC | Age: 70
End: 2020-01-06

## 2020-03-04 RX ORDER — CELECOXIB 200 MG/1
200 CAPSULE ORAL DAILY
Qty: 90 CAPSULE | Refills: 1 | Status: SHIPPED | OUTPATIENT
Start: 2020-03-04 | End: 2020-09-29

## 2020-03-05 NOTE — TELEPHONE ENCOUNTER
Last visit: 12-4-9  Last Med refill:8-19-19  Does patient have enough medication for 72 hours: No:     Next Visit Date:  Future Appointments   Date Time Provider Petey Moon   4/1/2020  2:00 PM MD Joseline Willett 22 Maintenance   Topic Date Due    Annual Wellness Visit (AWV)  05/29/2019    DTaP/Tdap/Td vaccine (2 - Td) 03/01/2021    Breast cancer screen  07/10/2021    Lipid screen  12/18/2024    Colon cancer screen colonoscopy  11/08/2028    DEXA (modify frequency per FRAX score)  Completed    Shingles Vaccine  Completed    Pneumococcal 65+ years Vaccine  Completed    Hepatitis C screen  Completed    Hepatitis A vaccine  Aged Out    Hepatitis B vaccine  Aged Out    Hib vaccine  Aged Out    Meningococcal (ACWY) vaccine  Aged Out       No results found for: LABA1C          ( goal A1C is < 7)   No results found for: LABMICR  LDL Cholesterol (mg/dL)   Date Value   08/15/2018 138 (H)     LDL Calculated (mg/dL)   Date Value   12/18/2019 175 (A)       (goal LDL is <100)   AST (U/L)   Date Value   12/18/2019 18     ALT (U/L)   Date Value   12/18/2019 29     BUN (mg/dL)   Date Value   12/18/2019 24     BP Readings from Last 3 Encounters:   12/04/19 130/80   06/26/19 132/84   05/09/19 128/70          (goal 120/80)    All Future Testing planned in [facility]              Patient Active Problem List:     Homozygous Factor V Leiden mutation ([facility] [address]     Mild intermittent asthma without complication     Delta storage pool disease ([facility] [address]     Cervical stenosis of spine     Primary osteoarthritis of both shoulders     Primary osteoarthritis of both knees     Hyperlipidemia     History of meningitis     Gastroesophageal reflux disease     Hiatal hernia     Colon adenoma     Major depressive disorder with single episode, in partial remission (HCC)     Anxiety     Sleep disturbance     History of Lyme disease     Chronic pain syndrome

## 2020-03-18 RX ORDER — EZETIMIBE 10 MG/1
10 TABLET ORAL DAILY
Qty: 90 TABLET | Refills: 1 | Status: SHIPPED | OUTPATIENT
Start: 2020-03-18 | End: 2020-09-14

## 2020-03-18 NOTE — TELEPHONE ENCOUNTER
LOV 12-4-19  LRF 8-30-19    Health Maintenance   Topic Date Due    Annual Wellness Visit (AWV)  05/29/2019    DTaP/Tdap/Td vaccine (2 - Td) 03/01/2021    Breast cancer screen  07/10/2021    Lipid screen  12/18/2024    Colon cancer screen colonoscopy  11/08/2028    DEXA (modify frequency per FRAX score)  Completed    Shingles Vaccine  Completed    Pneumococcal 65+ years Vaccine  Completed    Hepatitis C screen  Completed    Hepatitis A vaccine  Aged Out    Hepatitis B vaccine  Aged Out    Hib vaccine  Aged Out    Meningococcal (ACWY) vaccine  Aged Out             (applicable per patient's age: Cancer Screenings, Depression Screening, Fall Risk Screening, Immunizations)    LDL Cholesterol (mg/dL)   Date Value   08/15/2018 138 (H)     LDL Calculated (mg/dL)   Date Value   12/18/2019 175 (A)     AST (U/L)   Date Value   12/18/2019 18     ALT (U/L)   Date Value   12/18/2019 29     BUN (mg/dL)   Date Value   12/18/2019 24      (goal A1C is < 7)   (goal LDL is <100) need 30-50% reduction from baseline     BP Readings from Last 3 Encounters:   12/04/19 130/80   06/26/19 132/84   05/09/19 128/70    (goal /80)      All Future Testing planned in [facility]:      Next Visit Date:  Future Appointments   Date Time Provider Petey Moon   4/1/2020  2:00 PM MD [facility] Loreta Zuniga            Patient Active Problem List:     Homozygous Factor V Leiden mutation ([facility] [address]     Mild intermittent asthma without complication     Delta storage pool disease [facility])     Cervical stenosis of spine     Primary osteoarthritis of both shoulders     Primary osteoarthritis of both knees     Hyperlipidemia     History of meningitis     Gastroesophageal reflux disease     Hiatal hernia     Colon adenoma     Major depressive disorder with single episode, in partial remission ([facility] [place] [address]     Anxiety     Sleep disturbance     History of Lyme disease     Chronic pain syndrome  ,

## 2020-05-22 RX ORDER — MONTELUKAST SODIUM 10 MG/1
10 TABLET ORAL NIGHTLY
Qty: 90 TABLET | Refills: 1 | Status: SHIPPED | OUTPATIENT
Start: 2020-05-22 | End: 2020-11-19

## 2020-05-22 NOTE — TELEPHONE ENCOUNTER
LOV 12-4-19  LRF 11-7-19    Health Maintenance   Topic Date Due    Annual Wellness Visit (AWV)  05/29/2019    DTaP/Tdap/Td vaccine (2 - Td) 03/01/2021    Breast cancer screen  07/10/2021    Lipid screen  12/18/2024    Colon cancer screen colonoscopy  11/08/2028    DEXA (modify frequency per FRAX score)  Completed    Shingles Vaccine  Completed    Pneumococcal 65+ years Vaccine  Completed    Hepatitis C screen  Completed    Hepatitis A vaccine  Aged Out    Hepatitis B vaccine  Aged Out    Hib vaccine  Aged Out    Meningococcal (ACWY) vaccine  Aged Out             (applicable per patient's age: Cancer Screenings, Depression Screening, Fall Risk Screening, Immunizations)    LDL Cholesterol (mg/dL)   Date Value   08/15/2018 138 (H)     LDL Calculated (mg/dL)   Date Value   12/18/2019 175 (A)     AST (U/L)   Date Value   12/18/2019 18     ALT (U/L)   Date Value   12/18/2019 29     BUN (mg/dL)   Date Value   12/18/2019 24      (goal A1C is < 7)   (goal LDL is <100) need 30-50% reduction from baseline     BP Readings from Last 3 Encounters:   12/04/19 130/80   06/26/19 132/84   05/09/19 128/70    (goal /80)      All Future Testing planned in [facility]:      Next Visit Date:  No future appointments.          Patient Active Problem List:     Homozygous Factor V Leiden mutation ([facility] [place] [address]     Mild intermittent asthma without complication     Delta storage pool disease [facility])     Cervical stenosis of spine     Primary osteoarthritis of both shoulders     Primary osteoarthritis of both knees     Hyperlipidemia     History of meningitis     Gastroesophageal reflux disease     Hiatal hernia     Colon adenoma     Major depressive disorder with single episode, in partial remission (HCC)     Anxiety     Sleep disturbance     History of Lyme disease     Chronic pain syndrome

## 2020-06-23 RX ORDER — DESVENLAFAXINE 50 MG/1
50 TABLET, EXTENDED RELEASE ORAL DAILY
Qty: 30 TABLET | Refills: 5 | Status: SHIPPED | OUTPATIENT
Start: 2020-06-23 | End: 2020-12-23 | Stop reason: SDUPTHER

## 2020-07-20 RX ORDER — TRAZODONE HYDROCHLORIDE 150 MG/1
150 TABLET ORAL NIGHTLY
Qty: 90 TABLET | Refills: 1 | Status: SHIPPED | OUTPATIENT
Start: 2020-07-20 | End: 2021-01-19

## 2020-07-20 NOTE — TELEPHONE ENCOUNTER
Last visit: 12-4-19  Last Med refill: 1-24-20  Does patient have enough medication for 72 hours:yes    Next Visit Date:  Future Appointments   Date Time Provider Petey Moon   9/9/2020  3:00 PM MD Kiarra Prajapati. [address] Maintenance   Topic Date Due    Annual Wellness Visit (AWV)  05/29/2019    DTaP/Tdap/Td vaccine (2 - Td) 03/01/2021    Breast cancer screen  07/10/2021    Lipid screen  12/18/2024    Colon cancer screen colonoscopy  11/08/2028    DEXA (modify frequency per FRAX score)  Completed    Shingles Vaccine  Completed    Pneumococcal 65+ years Vaccine  Completed    Hepatitis C screen  Completed    Hepatitis A vaccine  Aged Out    Hepatitis B vaccine  Aged Out    Hib vaccine  Aged Out    Meningococcal (ACWY) vaccine  Aged Out       No results found for: LABA1C          ( goal A1C is < 7)   No results found for: LABMICR  LDL Cholesterol (mg/dL)   Date Value   08/15/2018 138 (H)     LDL Calculated (mg/dL)   Date Value   12/18/2019 175 (A)       (goal LDL is <100)   AST (U/L)   Date Value   12/18/2019 18     ALT (U/L)   Date Value   12/18/2019 29     BUN (mg/dL)   Date Value   12/18/2019 24     BP Readings from Last 3 Encounters:   12/04/19 130/80   06/26/19 132/84   05/09/19 128/70          (goal 120/80)    All Future Testing planned in [facility]              Patient Active Problem List:     Homozygous Factor V Leiden mutation ([facility] [address]     Mild intermittent asthma without complication     Delta storage pool disease ([facility] [address]     Cervical stenosis of spine     Primary osteoarthritis of both shoulders     Primary osteoarthritis of both knees     Hyperlipidemia     History of meningitis     Gastroesophageal reflux disease     Hiatal hernia     Colon adenoma     Major depressive disorder with single episode, in partial remission (HCC)     Anxiety     Sleep disturbance     History of Lyme disease     Chronic pain syndrome

## 2020-08-07 ENCOUNTER — TELEPHONE (OUTPATIENT)
Dept: FAMILY MEDICINE CLINIC | Age: 70
End: 2020-08-07

## 2020-08-07 NOTE — TELEPHONE ENCOUNTER
Mammogram order signed. Please fax to [facility] [employer] as requested. Close encounter when complete.

## 2020-08-07 NOTE — TELEPHONE ENCOUNTER
Patient is contacting the office today because she needs an order for a mammogram. Patient needs it sent to [facility].  Fax to [phone]

## 2020-09-09 ENCOUNTER — OFFICE VISIT (OUTPATIENT)
Dept: FAMILY MEDICINE CLINIC | Age: 70
End: 2020-09-09
Payer: MEDICARE

## 2020-09-09 VITALS
BODY MASS INDEX: 29.66 KG/M2 | RESPIRATION RATE: 16 BRPM | HEART RATE: 82 BPM | SYSTOLIC BLOOD PRESSURE: 118 MMHG | HEIGHT: 63 IN | DIASTOLIC BLOOD PRESSURE: 84 MMHG | WEIGHT: 167.4 LBS | TEMPERATURE: 97.6 F

## 2020-09-09 PROCEDURE — G0438 PPPS, INITIAL VISIT: HCPCS | Performed by: PEDIATRICS

## 2020-09-09 PROCEDURE — 1123F ACP DISCUSS/DSCN MKR DOCD: CPT | Performed by: PEDIATRICS

## 2020-09-09 PROCEDURE — 3017F COLORECTAL CA SCREEN DOC REV: CPT | Performed by: PEDIATRICS

## 2020-09-09 PROCEDURE — 4040F PNEUMOC VAC/ADMIN/RCVD: CPT | Performed by: PEDIATRICS

## 2020-09-09 ASSESSMENT — PATIENT HEALTH QUESTIONNAIRE - PHQ9
SUM OF ALL RESPONSES TO PHQ QUESTIONS 1-9: 1
SUM OF ALL RESPONSES TO PHQ9 QUESTIONS 1 & 2: 1
SUM OF ALL RESPONSES TO PHQ QUESTIONS 1-9: 1
2. FEELING DOWN, DEPRESSED OR HOPELESS: 1
1. LITTLE INTEREST OR PLEASURE IN DOING THINGS: 0

## 2020-09-09 NOTE — PROGRESS NOTES
Medicare Annual Wellness Visit    Name: Yoli Beltrán Date: 2020   MRN: [id number] Sex: Female   Age: 81 y.o. Ethnicity: Non-/Non    : 1950 Race: Braydon Montez is here for Medicare AWV    Screenings for behavioral, psychosocial and functional/safety risks, and cognitive dysfunction are all negative except as indicated below. These results, as well as other patient data from the [address] form, are documented in Flowsheets linked to this Encounter. Allergies   Allergen Reactions    Latex     Ambien [Zolpidem Tartrate]      Feels crazy    Statins Other (See Comments)     Muscle pain    Ciprofloxacin Nausea And Vomiting    Influenza Vaccines Nausea And Vomiting    Other Nausea And Vomiting     All Narcotic pain medications     Prozac [Fluoxetine Hcl] Nausea And Vomiting         Prior to Visit Medications    Medication Sig Taking?  Authorizing Provider   traZODone (DESYREL) 150 MG tablet Take 1 tablet by mouth nightly Yes Mark Mendez MD   desvenlafaxine succinate (PRISTIQ) 50 MG TB24 extended release tablet Take 1 tablet by mouth daily Yes Mark Mendez MD   montelukast (SINGULAIR) 10 MG tablet Take 1 tablet by mouth nightly Yes Mark Mendez MD   ezetimibe (ZETIA) 10 MG tablet Take 1 tablet by mouth daily Yes Mark Mendez MD   celecoxib (CELEBREX) 200 MG capsule Take 1 capsule by mouth daily Yes Mark Mendez MD   dexlansoprazole (DEXILANT) 60 MG CPDR delayed release capsule Take 1 capsule by mouth daily Yes Mark Mendez MD   albuterol sulfate HFA (VENTOLIN HFA) 108 (90 Base) MCG/ACT inhaler Inhale 2 puffs into the lungs every 4 hours as needed for Wheezing Yes Mark Mendez MD   budesonide-formoterol (SYMBICORT) 160-4.5 MCG/ACT AERO Inhale 2 puffs into the lungs 2 times daily Yes Mark Mendez MD   RA ALLERGY RELIEF 180 MG tablet  Yes Historical Provider, MD Fluticasone furoate-vilanterol (BREO ELLIPTA) 200-25 MCG/INH AEPB inhaler Inhale 1 puff into the lungs daily  Patient not taking: Reported on 9/9/2020  Juan Jose Adams MD   ranitidine (ZANTAC) 150 MG tablet Take 1 tablet by mouth 2 times daily  Patient not taking: Reported on 12/4/2019  Juan Jose Adams MD         Past Medical History:   Diagnosis Date    Asthma     Factor 5 Leiden mutation, heterozygous ([address]     GERD (gastroesophageal reflux disease)        Past Surgical History:   Procedure Laterality Date    BREAST REDUCTION SURGERY Bilateral 04/2017    Dr. Annmarie Franklin      NOSE SURGERY      SHOULDER ARTHROSCOPY      TONSILLECTOMY      TOTAL HIP ARTHROPLASTY Right          Family History   Problem Relation Age of Onset    Heart Attack Mother     Heart Disease Mother     Heart Attack Brother     Heart Disease Brother        CareTeam (Including outside providers/suppliers regularly involved in providing care):   Patient Care Team:  Juan Jose Adams MD as PCP - General (Internal Medicine)  Juan Jose Adams MD as PCP - REHABILITATION [facility] Empaneled Provider    Wt Readings from Last 3 Encounters:   09/09/20 167 lb 6.4 oz (75.9 kg)   12/04/19 178 lb (80.7 kg)   06/26/19 167 lb (75.8 kg)     Vitals:    09/09/20 1514   BP: 118/84   Site: Right Upper Arm   Position: Sitting   Cuff Size: Medium Adult   Pulse: 82   Resp: 16   Temp: 97.6 °F (36.4 °C)   TempSrc: Temporal   Weight: 167 lb 6.4 oz (75.9 kg)   Height: 5' 2.5\" (1.588 m)     Body mass index is 30.13 kg/m². Based upon direct observation of the patient, evaluation of cognition reveals recent and remote memory intact. Patient's complete Health Risk Assessment and screening values have been reviewed and are found in Flowsheets. The following problems were reviewed today and where indicated follow up appointments were made and/or referrals ordered.     Positive Risk Factor Screenings with Interventions:     Health Habits/Nutrition:  Health Habits/Nutrition  Do you exercise for at least 20 minutes 2-3 times per week?: (!) No  Have you lost any weight without trying in the past 3 months?: No  Do you eat fewer than 2 meals per day?: No  Have you seen a dentist within the past year?: Yes  Body mass index is 30.13 kg/m².   Health Habits/Nutrition Interventions:  · Inadequate physical activity:  patient agrees to ride her indoor bike and doing chores at home - indoor and outdoor / has not been back to the gym due to COVID   · Elevated BMI:  patient has lost 6 lbs in the last week after stopping eating sugar / recommend healthy diet / daily exercise to help with weight reduction     Safety:  Safety  Do you have working smoke detectors?: Yes  Have all throw rugs been removed or fastened?: (!) No  Do you have non-slip mats or surfaces in all bathtubs/showers?: Yes  Do all of your stairways have a railing or banister?: Yes  Are your doorways, halls and stairs free of clutter?: Yes  Do you always fasten your seatbelt when you are in a car?: Yes  Safety Interventions:  · Home safety tips provided    Personalized Preventive Plan   Current Health Maintenance Status  Immunization History   Administered Date(s) Administered    Pneumococcal Conjugate 13-valent ([id number]) 01/28/2015, 02/19/2018    Pneumococcal Polysaccharide ([id number]) 03/01/2001, 12/04/2019    Tdap (Boostrix, Adacel) 03/01/2011    Zoster Live (Zostavax) 01/23/2013    Zoster Recombinant (Shingrix) 07/29/2019, 10/22/2019        Health Maintenance   Topic Date Due    Annual Wellness Visit (AWV)  05/29/2019    DTaP/Tdap/Td vaccine (2 - Td) 03/01/2021    Breast cancer screen  07/10/2021    Lipid screen  12/18/2024    Colon cancer screen colonoscopy  11/08/2028    DEXA (modify frequency per FRAX score)  Completed    Shingles Vaccine  Completed    Pneumococcal 65+ years Vaccine  Completed    Hepatitis C screen Completed    Hepatitis A vaccine  Aged Out    Hepatitis B vaccine  Aged Out    Hib vaccine  Aged Out    Meningococcal (ACWY) vaccine  Aged Out     Recommendations for [employer] Due: see orders and patient instructions/AVS.  . Recommended screening schedule for the next 5-10 years is provided to the patient in written form: see Patient Johan Waller was seen today for medicare aw.     Diagnoses and all orders for this visit:    Routine general medical examination at a health care facility          Plan:      Colonoscopy 2023  Dexascan 2022

## 2020-09-09 NOTE — PATIENT INSTRUCTIONS
Personalized Preventive Plan for Joyce Delgado - 9/9/2020  Medicare offers a range of preventive health benefits. Some of the tests and screenings are paid in full while other may be subject to a deductible, co-insurance, and/or copay. Some of these benefits include a comprehensive review of your medical history including lifestyle, illnesses that may run in your family, and various assessments and screenings as appropriate. After reviewing your medical record and screening and assessments performed today your provider may have ordered immunizations, labs, imaging, and/or referrals for you. A list of these orders (if applicable) as well as your Preventive Care list are included within your After Visit Summary for your review. Other Preventive Recommendations:    · A preventive eye exam performed by an eye specialist is recommended every 1-2 years to screen for glaucoma; cataracts, macular degeneration, and other eye disorders. · A preventive dental visit is recommended every 6 months. · Try to get at least 150 minutes of exercise per week or 10,000 steps per day on a pedometer . · Order or download the FREE \"Exercise & Physical Activity: Your Everyday Guide\" from [employer]. Call 6-819.409.8834 or search [employer] online. · You need 4967-6043 mg of calcium and 4149-5288 IU of vitamin D per day. It is possible to meet your calcium requirement with diet alone, but a vitamin D supplement is usually necessary to meet this goal.  · When exposed to the sun, use a sunscreen that protects against both UVA and UVB radiation with an SPF of 30 or greater. Reapply every 2 to 3 hours or after sweating, drying off with a towel, or swimming. · Always wear a seat belt when traveling in a car. Always wear a helmet when riding a bicycle or motorcycle.

## 2020-09-14 RX ORDER — EZETIMIBE 10 MG/1
10 TABLET ORAL DAILY
Qty: 90 TABLET | Refills: 1 | Status: SHIPPED | OUTPATIENT
Start: 2020-09-14 | End: 2021-03-17

## 2020-09-14 NOTE — TELEPHONE ENCOUNTER
LV 12/4/19  LRF 3/18/20  RTO 3 months message left with patient to call our office for a follow appointment    Health Maintenance   Topic Date Due    Annual Wellness Visit (AWV)  05/29/2019    DTaP/Tdap/Td vaccine (2 - Td) 03/01/2021    Breast cancer screen  07/10/2021    Lipid screen  12/18/2024    Colon cancer screen colonoscopy  11/08/2028    DEXA (modify frequency per FRAX score)  Completed    Shingles Vaccine  Completed    Pneumococcal 65+ years Vaccine  Completed    Hepatitis C screen  Completed    Hepatitis A vaccine  Aged Out    Hepatitis B vaccine  Aged Out    Hib vaccine  Aged Out    Meningococcal (ACWY) vaccine  Aged Out             (applicable per patient's age: Cancer Screenings, Depression Screening, Fall Risk Screening, Immunizations)    LDL Cholesterol (mg/dL)   Date Value   08/15/2018 138 (H)     LDL Calculated (mg/dL)   Date Value   12/18/2019 175 (A)     AST (U/L)   Date Value   12/18/2019 18     ALT (U/L)   Date Value   12/18/2019 29     BUN (mg/dL)   Date Value   12/18/2019 24      (goal A1C is < 7)   (goal LDL is <100) need 30-50% reduction from baseline     BP Readings from Last 3 Encounters:   09/09/20 118/84   12/04/19 130/80   06/26/19 132/84    (goal /80)      All Future Testing planned in [facility]:  Lab Frequency Next Occurrence   CARLITA DIGITAL SCREEN W OR WO CAD BILATERAL Once 08/07/2020       Next Visit Date:  Future Appointments   Date Time Provider Petey Moon   12/23/2020  3:20 PM Lourdes Lesches, MD 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)     Cervical stenosis of spine     Primary osteoarthritis of both shoulders     Primary osteoarthritis of both knees     Hyperlipidemia     History of meningitis     Gastroesophageal reflux disease     Hiatal hernia     Colon adenoma     Major depressive disorder with

## 2020-09-21 ENCOUNTER — TELEPHONE (OUTPATIENT)
Dept: FAMILY MEDICINE CLINIC | Age: 70
End: 2020-09-21

## 2020-09-21 NOTE — TELEPHONE ENCOUNTER
There is a referral from 12/2019 in her chart - if they need a new one - please place order for a new one with the same diagnoses (please verify diagnoses) and send for me to approve.

## 2020-09-22 NOTE — TELEPHONE ENCOUNTER
Patient stated she has been on a waiting list and they will not see her until referral is updated.     Orders Pended for editing and approval.

## 2020-09-29 RX ORDER — CELECOXIB 200 MG/1
CAPSULE ORAL
Qty: 90 CAPSULE | Refills: 1 | Status: SHIPPED | OUTPATIENT
Start: 2020-09-29 | End: 2021-03-17

## 2020-11-06 RX ORDER — ERYTHROMYCIN 250 MG/1
250 TABLET, COATED ORAL 4 TIMES DAILY
Qty: 120 TABLET | Refills: 0 | Status: SHIPPED | OUTPATIENT
Start: 2020-11-06 | End: 2020-11-12 | Stop reason: SDUPTHER

## 2020-11-06 NOTE — TELEPHONE ENCOUNTER
depressive disorder with single episode, in partial remission (HCC)     Anxiety     Sleep disturbance     History of Lyme disease     Chronic pain syndrome

## 2020-11-12 RX ORDER — ERYTHROMYCIN 250 MG/1
250 TABLET, COATED ORAL 4 TIMES DAILY
Qty: 120 TABLET | Refills: 0 | Status: SHIPPED | OUTPATIENT
Start: 2020-11-12 | End: 2021-02-20

## 2020-11-19 RX ORDER — MONTELUKAST SODIUM 10 MG/1
10 TABLET ORAL NIGHTLY
Qty: 90 TABLET | Refills: 1 | Status: SHIPPED | OUTPATIENT
Start: 2020-11-19 | End: 2021-05-24

## 2020-11-19 NOTE — TELEPHONE ENCOUNTER
LOV 9-9-20  LRF 5-22-20    Health Maintenance   Topic Date Due    DTaP/Tdap/Td vaccine (2 - Td) 03/01/2021    Breast cancer screen  07/10/2021    Annual Wellness Visit (AWV)  09/10/2021    Lipid screen  12/18/2024    Colon cancer screen colonoscopy  11/08/2028    DEXA (modify frequency per FRAX score)  Completed    Shingles Vaccine  Completed    Pneumococcal 65+ years Vaccine  Completed    Hepatitis C screen  Completed    Hepatitis A vaccine  Aged Out    Hepatitis B vaccine  Aged Out    Hib vaccine  Aged Out    Meningococcal (ACWY) vaccine  Aged Out             (applicable per patient's age: Cancer Screenings, Depression Screening, Fall Risk Screening, Immunizations)    LDL Cholesterol (mg/dL)   Date Value   08/15/2018 138 (H)     LDL Calculated (mg/dL)   Date Value   12/18/2019 175 (A)     AST (U/L)   Date Value   12/18/2019 18     ALT (U/L)   Date Value   12/18/2019 29     BUN (mg/dL)   Date Value   12/18/2019 24      (goal A1C is < 7)   (goal LDL is <100) need 30-50% reduction from baseline     BP Readings from Last 3 Encounters:   09/09/20 118/84   12/04/19 130/80   06/26/19 132/84    (goal /80)      All Future Testing planned in [facility]:  Lab Frequency Next Occurrence   CARLITA DIGITAL SCREEN W OR WO CAD BILATERAL Once 08/07/2020       Next Visit Date:  Future Appointments   Date Time Provider Petey Moon   12/16/2020  3:20 PM Steven Diop MD [facility] [address] [facility]            Patient Active Problem List:     Homozygous Factor V Leiden mutation ([facility] [address]     Mild intermittent asthma without complication     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

## 2020-12-23 RX ORDER — DESVENLAFAXINE 50 MG/1
50 TABLET, EXTENDED RELEASE ORAL DAILY
Qty: 30 TABLET | Refills: 5 | Status: SHIPPED | OUTPATIENT
Start: 2020-12-23 | End: 2021-06-21

## 2020-12-23 NOTE — TELEPHONE ENCOUNTER
LOV 9-9-20  LRF 6-23-20    Health Maintenance   Topic Date Due    DTaP/Tdap/Td vaccine (2 - Td) 03/01/2021    Breast cancer screen  07/10/2021    Annual Wellness Visit (AWV)  09/10/2021    Lipid screen  12/18/2024    Colon cancer screen colonoscopy  11/08/2028    DEXA (modify frequency per FRAX score)  Completed    Shingles Vaccine  Completed    Pneumococcal 65+ years Vaccine  Completed    Hepatitis C screen  Completed    Hepatitis A vaccine  Aged Out    Hepatitis B vaccine  Aged Out    Hib vaccine  Aged Out    Meningococcal (ACWY) vaccine  Aged Out             (applicable per patient's age: Cancer Screenings, Depression Screening, Fall Risk Screening, Immunizations)    LDL Cholesterol (mg/dL)   Date Value   08/15/2018 138 (H)     LDL Calculated (mg/dL)   Date Value   12/18/2019 175 (A)     AST (U/L)   Date Value   12/18/2019 18     ALT (U/L)   Date Value   12/18/2019 29     BUN (mg/dL)   Date Value   12/18/2019 24      (goal A1C is < 7)   (goal LDL is <100) need 30-50% reduction from baseline     BP Readings from Last 3 Encounters:   09/09/20 118/84   12/04/19 130/80   06/26/19 132/84    (goal /80)      All Future Testing planned in [facility]:  Lab Frequency Next Occurrence   CARLITA DIGITAL SCREEN W OR WO CAD BILATERAL Once 08/07/2020       Next Visit Date:  Future Appointments   Date Time Provider Petey Moon   2/10/2021  3:00 PM MD [facility]            Patient Active Problem List:     Homozygous Factor V Leiden mutation ([facility] [address]     Mild intermittent asthma without complication     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

## 2021-01-19 DIAGNOSIS — F32.4 MAJOR DEPRESSIVE DISORDER WITH SINGLE EPISODE, IN PARTIAL REMISSION (HCC): ICD-10-CM

## 2021-01-19 DIAGNOSIS — G47.9 SLEEP DISTURBANCE: ICD-10-CM

## 2021-01-19 RX ORDER — TRAZODONE HYDROCHLORIDE 150 MG/1
150 TABLET ORAL NIGHTLY
Qty: 90 TABLET | Refills: 1 | Status: SHIPPED | OUTPATIENT
Start: 2021-01-19 | End: 2021-06-21

## 2021-01-19 NOTE — TELEPHONE ENCOUNTER
Last visit: 09-  Last Med refill: 07/20/2020  Does patient have enough medication for 72 hours: No:     Next Visit Date:  Future Appointments   Date Time Provider Petey Moon   2/10/2021  3:00 PM [facility] [address] Maintenance   Topic Date Due    DTaP/Tdap/Td vaccine (2 - Td) 03/01/2021    Breast cancer screen  07/10/2021    Annual Wellness Visit (AWV)  09/10/2021    Lipid screen  12/18/2024    Colon cancer screen colonoscopy  11/08/2028    DEXA (modify frequency per FRAX score)  Completed    Shingles Vaccine  Completed    Pneumococcal 65+ years Vaccine  Completed    Hepatitis C screen  Completed    Hepatitis A vaccine  Aged Out    Hepatitis B vaccine  Aged Out    Hib vaccine  Aged Out    Meningococcal (ACWY) vaccine  Aged Out       No results found for: LABA1C          ( goal A1C is < 7)   No results found for: LABMICR  LDL Cholesterol (mg/dL)   Date Value   08/15/2018 138 (H)     LDL Calculated (mg/dL)   Date Value   12/18/2019 175 (A)       (goal LDL is <100)   AST (U/L)   Date Value   12/18/2019 18     ALT (U/L)   Date Value   12/18/2019 29     BUN (mg/dL)   Date Value   12/18/2019 24     BP Readings from Last 3 Encounters:   09/09/20 118/84   12/04/19 130/80   06/26/19 132/84          (goal 120/80)    All Future Testing planned in [facility]  Lab Frequency Next Occurrence   CARLITA DIGITAL SCREEN W OR WO CAD BILATERAL Once 08/07/2020               Patient Active Problem List:     Homozygous Factor V Leiden mutation ([address]     Mild intermittent asthma without complication     Delta storage pool disease ([address]     Cervical stenosis of spine     Primary osteoarthritis of both shoulders     Primary osteoarthritis of both knees     Hyperlipidemia     History of meningitis     Gastroesophageal reflux disease     Hiatal hernia     Colon adenoma     Major depressive disorder with single episode, in partial remission ([address]     Anxiety     Sleep disturbance History of Lyme disease     Chronic pain syndrome

## 2021-02-10 ENCOUNTER — OFFICE VISIT (OUTPATIENT)
Dept: FAMILY MEDICINE CLINIC | Age: 71
End: 2021-02-10
Payer: MEDICARE

## 2021-02-10 VITALS
DIASTOLIC BLOOD PRESSURE: 84 MMHG | WEIGHT: 170.6 LBS | HEIGHT: 63 IN | SYSTOLIC BLOOD PRESSURE: 136 MMHG | TEMPERATURE: 96.3 F | RESPIRATION RATE: 16 BRPM | BODY MASS INDEX: 30.23 KG/M2 | HEART RATE: 83 BPM

## 2021-02-10 DIAGNOSIS — R53.83 OTHER FATIGUE: ICD-10-CM

## 2021-02-10 DIAGNOSIS — M19.012 PRIMARY OSTEOARTHRITIS OF BOTH SHOULDERS: ICD-10-CM

## 2021-02-10 DIAGNOSIS — Z86.61 HISTORY OF MENINGITIS: ICD-10-CM

## 2021-02-10 DIAGNOSIS — F41.9 ANXIETY: ICD-10-CM

## 2021-02-10 DIAGNOSIS — R11.0 NAUSEA: ICD-10-CM

## 2021-02-10 DIAGNOSIS — D69.1 DELTA STORAGE POOL DISEASE (HCC): ICD-10-CM

## 2021-02-10 DIAGNOSIS — G47.9 SLEEP DISTURBANCE: ICD-10-CM

## 2021-02-10 DIAGNOSIS — K44.9 HIATAL HERNIA: ICD-10-CM

## 2021-02-10 DIAGNOSIS — D68.51 HOMOZYGOUS FACTOR V LEIDEN MUTATION (HCC): ICD-10-CM

## 2021-02-10 DIAGNOSIS — M19.011 PRIMARY OSTEOARTHRITIS OF BOTH SHOULDERS: ICD-10-CM

## 2021-02-10 DIAGNOSIS — M54.2 NECK PAIN: ICD-10-CM

## 2021-02-10 DIAGNOSIS — G89.4 CHRONIC PAIN SYNDROME: ICD-10-CM

## 2021-02-10 DIAGNOSIS — E78.5 HYPERLIPIDEMIA, UNSPECIFIED HYPERLIPIDEMIA TYPE: ICD-10-CM

## 2021-02-10 DIAGNOSIS — E55.9 VITAMIN D DEFICIENCY, UNSPECIFIED: ICD-10-CM

## 2021-02-10 DIAGNOSIS — F32.4 MAJOR DEPRESSIVE DISORDER WITH SINGLE EPISODE, IN PARTIAL REMISSION (HCC): Primary | ICD-10-CM

## 2021-02-10 DIAGNOSIS — R51.9 CHRONIC INTRACTABLE HEADACHE, UNSPECIFIED HEADACHE TYPE: ICD-10-CM

## 2021-02-10 DIAGNOSIS — G89.29 CHRONIC INTRACTABLE HEADACHE, UNSPECIFIED HEADACHE TYPE: ICD-10-CM

## 2021-02-10 DIAGNOSIS — K31.84 GASTROPARESIS: ICD-10-CM

## 2021-02-10 DIAGNOSIS — M48.02 CERVICAL STENOSIS OF SPINE: ICD-10-CM

## 2021-02-10 DIAGNOSIS — Z12.31 ENCOUNTER FOR SCREENING MAMMOGRAM FOR MALIGNANT NEOPLASM OF BREAST: ICD-10-CM

## 2021-02-10 DIAGNOSIS — M17.0 PRIMARY OSTEOARTHRITIS OF BOTH KNEES: ICD-10-CM

## 2021-02-10 DIAGNOSIS — K21.9 GASTROESOPHAGEAL REFLUX DISEASE, UNSPECIFIED WHETHER ESOPHAGITIS PRESENT: ICD-10-CM

## 2021-02-10 DIAGNOSIS — Z86.19 HISTORY OF LYME DISEASE: ICD-10-CM

## 2021-02-10 DIAGNOSIS — J45.20 MILD INTERMITTENT ASTHMA WITHOUT COMPLICATION: ICD-10-CM

## 2021-02-10 PROCEDURE — G8484 FLU IMMUNIZE NO ADMIN: HCPCS | Performed by: PEDIATRICS

## 2021-02-10 PROCEDURE — G8417 CALC BMI ABV UP PARAM F/U: HCPCS | Performed by: PEDIATRICS

## 2021-02-10 PROCEDURE — 4040F PNEUMOC VAC/ADMIN/RCVD: CPT | Performed by: PEDIATRICS

## 2021-02-10 PROCEDURE — 99214 OFFICE O/P EST MOD 30 MIN: CPT | Performed by: PEDIATRICS

## 2021-02-10 PROCEDURE — 1090F PRES/ABSN URINE INCON ASSESS: CPT | Performed by: PEDIATRICS

## 2021-02-10 PROCEDURE — 1123F ACP DISCUSS/DSCN MKR DOCD: CPT | Performed by: PEDIATRICS

## 2021-02-10 PROCEDURE — G8400 PT W/DXA NO RESULTS DOC: HCPCS | Performed by: PEDIATRICS

## 2021-02-10 PROCEDURE — 1036F TOBACCO NON-USER: CPT | Performed by: PEDIATRICS

## 2021-02-10 PROCEDURE — G8427 DOCREV CUR MEDS BY ELIG CLIN: HCPCS | Performed by: PEDIATRICS

## 2021-02-10 PROCEDURE — 3017F COLORECTAL CA SCREEN DOC REV: CPT | Performed by: PEDIATRICS

## 2021-02-10 RX ORDER — ONDANSETRON 4 MG/1
4 TABLET, FILM COATED ORAL 3 TIMES DAILY PRN
Qty: 270 TABLET | Refills: 0 | Status: SHIPPED | OUTPATIENT
Start: 2021-02-10 | End: 2021-06-21

## 2021-02-10 RX ORDER — METOCLOPRAMIDE 10 MG/1
10 TABLET ORAL
Qty: 270 TABLET | Refills: 0 | Status: SHIPPED | OUTPATIENT
Start: 2021-02-10 | End: 2021-06-21

## 2021-02-10 NOTE — PROGRESS NOTES
Mindi Nguyễn (:  1950) is a 78 y.o. female,Established patient, here for evaluation of the following chief complaint(s):  Hyperlipidemia, Depression, and Gastroesophageal Reflux      ASSESSMENT/PLAN:  1. Major depressive disorder with single episode, in partial remission (HCC)  -     CBC; Future  2. Anxiety  3. Sleep disturbance  -     CBC; Future  -     TSH without Reflex; Future  4. Other fatigue  5. Mild intermittent asthma without complication  6. Gastroparesis  -     metoclopramide (REGLAN) 10 MG tablet; Take 1 tablet by mouth 3 times daily (with meals), Disp-270 tablet, R-0Normal  7. Nausea  -     ondansetron (ZOFRAN) 4 MG tablet; Take 1 tablet by mouth 3 times daily as needed for Nausea or Vomiting, Disp-270 tablet, R-0Normal  8. Gastroesophageal reflux disease, unspecified whether esophagitis present  -     Vitamin B12; Future  -     Vitamin D 25 Hydroxy; Future  9. Hiatal hernia  10. Chronic intractable headache, unspecified headache type  -     Erenumab-aooe 140 MG/ML SOAJ; Inject 140 mg into the skin every 30 days, Disp-1 pen, R-0Sample  11. History of meningitis  12. History of Lyme disease  13. Neck pain  14. Cervical stenosis of spine  15. Chronic pain syndrome  16. Hyperlipidemia, unspecified hyperlipidemia type  -     Lipid Panel; Future  -     Comprehensive Metabolic Panel; Future  17. Primary osteoarthritis of both shoulders  18. Primary osteoarthritis of both knees  19. Vitamin D deficiency, unspecified   -     Vitamin D 25 Hydroxy; Future  20. Homozygous Factor V Leiden mutation ([facility] [address]  21.  Delta storage pool disease [facility])      Proceed with continue current medications  Obtain labs as ordered  Daily exercise and healthy diet encouraged  Continue Singulair and albuterol as needed  Discontinue erythromycin due to cost  Reglan as prescribed / Saji Soto as prescribed  Follow-up with GI  Follow-up with pain management  Continue Aimovig - patient given sample  Follow-up with neurology as needed  Continue Zetia  Follow-up with Ortho as scheduled  Continue vitamin D supplementation  Follow-up with hematology if surgery is warranted  Call with concerns         Return in about 6 months (around 8/10/2021) for routine follow up. SUBJECTIVE/OBJECTIVE:    HPI    Patient presents today for routine follow-up of her chronic medical problems. She does have a history of major depression, some anxiety and sleep disturbance for which she uses Pristiq and trazodone. She states that her symptoms are very well controlled with this current dosage. She states that December is always been a bad time for her because this is when her son  and may will always be bad as of this is when his birthday was. She does feels overall though the Pristiq has worked well for her mood. She does continue to have some fatigue and likely this is because of her mild sleep disturbance that is a little better with working less. She does not wish to take anything for this. She does ask about seeing an allergist and I advised her that this would be a good idea for her if she would like to discussed being desensitized to her allergies. She does have a history of mild intermittent asthma that does now seem to be very well controlled. The cold air is brutal for her. She does continue on singulair and albuterol as needed. She does think she might have had COVID last year when she was sick for so long. I do agree with her. Her only concern is that she does have a cough when she eats something cold. She does feel as though there may be some type of esophageal peristaltic problem. This could go along with her history of gastroparesis. She states that when eating certain foods like chicken and eggs she has to have a drink of water with it. She does continue to use Dexilant in the morning and Nexium at night along with this and this normally controls her GERD and hiatal hernia well.   She was previously taking erythromycin for her gastroparesis but unfortunately this is $600 for 30 tablets. She would like a prescription for Reglan to use as needed for when this flares up. Normally this will cause some nausea and occasional epigastric pain. I did advise her that she should follow-up with her GI specialist, Dr. Charisse Godinez regarding her concerns with possible esophageal motility disorder. She does report that she continues to have a chronic headache,  She does have a history of meningitis and Lyme disease. She was previously seeing a neurologist, Dr. Jewel Osorio but she has not followed up with him because he did tell her that she would need to see a acupuncturist in [place]. He did think her headaches were secondary to the neck pain she has from her history of cervical stenosis. She has been seeing her pain management specialist, Dr. Rob Patel. She has tried nurtec, ubrelvy and botox. She finally did see some improvement after the botox. The front of the head injections took the frontal headache away immediately but the posterior neck injections caused a lot of neck pain. She had to take 3 weeks of prednisone 20mg once daily and heat to get this better. She saw the NP with him over a month ago and she wanted her to try aimovig. Dr. Rob Patel didn't want to do this because of her latex allergies but she did it anyway. The intensity of the headaches improved but the pain has not gone away completely. She is supposed to follow up in early March. With regards to her chronic pain syndrome she did have several rounds of occipital injections with pain management. These all made her headaches worse. She was offered a spinal nerve stimulator but she elected not to do this. She was tried on gabapentin but was unable to tolerate this due to side effects of fatigue and unsteadiness. She also tried medical marijuana and was given such a high dose of marijuana she did not do well with that.   She does have a history of hyperlipidemia that is treated with Zetia. She is tolerating this well. She has osteoarthritis of both shoulders and knees which does bother her frequently. She has bone spurs and arthritis with an unknown biceps injury of the left shoulder. She does use Celebrex which has been helpful and she also has Voltaren gel that she uses. She has had injections previously with Dr. Garrett Wallace and Dr. David Dumont. She states her knee injections were not too long ago. Her right hip has been replaced. She does have a history of vitamin D deficiency and takes a vitamin D supplement. She also has a history of homozygous factor V Leyden mutation and delta storage pool disease which are stable and she does have a hematologist, Dr. Idania Mccray that she will follow with only if she has surgery. She is now working only 2 to 2-1/2 days of work. This has cut down on her driving time which has helped with some of her symptoms. She does feel as though her sleep has been a little bit better since working less. Unfortunately the cold weather does bother her significantly. She has no other concerns at this time. cmw        Review of Systems   Constitutional: Positive for fatigue. Negative for appetite change, fever and unexpected weight change. HENT: Positive for congestion. Negative for ear pain, hearing loss, postnasal drip, rhinorrhea, sinus pressure, sneezing, sore throat and tinnitus. Eyes: Negative for photophobia, pain, redness and visual disturbance. Respiratory: Positive for cough (occasionally). Negative for chest tightness, shortness of breath, wheezing and stridor. Asthma controlled   Cardiovascular: Negative for chest pain, palpitations and leg swelling. Gastrointestinal: Negative for abdominal pain, blood in stool, constipation, diarrhea, nausea and vomiting. Nausea and abdominal pain secondary to hiatal hernia, GERD and gastroparesis is well-controlled with dexilant, Nexium and erythromycin intermittently.   She does feel like she has some esophageal motility disorder. Endocrine: Negative for polydipsia, polyphagia and polyuria. Genitourinary: Negative for decreased urine volume, dysuria, frequency, hematuria and urgency. Musculoskeletal: Positive for arthralgias (of the shoulders and knees), back pain and neck pain. Negative for myalgias. Skin: Negative for color change, rash and wound. Allergic/Immunologic: Negative for immunocompromised state. Neurological: Positive for headaches. Negative for dizziness, tremors, seizures, weakness, light-headedness and numbness. Hematological: Negative for adenopathy. Does not bruise/bleed easily. Psychiatric/Behavioral: Positive for dysphoric mood (improved with pristiq) and sleep disturbance (Stable with trazodone). The patient is nervous/anxious (occasionally). Physical Exam  Vitals signs and nursing note reviewed. Constitutional:       General: She is not in acute distress. Appearance: Normal appearance. She is well-developed and normal weight. She is not ill-appearing, toxic-appearing or diaphoretic. HENT:      Head: Normocephalic and atraumatic. Right Ear: Tympanic membrane, ear canal and external ear normal.      Left Ear: Tympanic membrane, ear canal and external ear normal.      Nose: Mucosal edema present. No congestion or rhinorrhea. Mouth/Throat:      Lips: Pink. Mouth: Mucous membranes are moist.      Pharynx: Oropharynx is clear. No pharyngeal swelling, oropharyngeal exudate or posterior oropharyngeal erythema. Eyes:      General: No scleral icterus. Right eye: No discharge. Left eye: No discharge. Pupils: Pupils are equal, round, and reactive to light. Neck:      Musculoskeletal: Normal range of motion. Muscular tenderness present. Thyroid: No thyromegaly. Vascular: No JVD. Cardiovascular:      Rate and Rhythm: Normal rate and regular rhythm. Pulses: Normal pulses. Heart sounds: Murmur present. Pulmonary:      Effort: Pulmonary effort is normal. No respiratory distress. Breath sounds: No transmitted upper airway sounds. No decreased breath sounds, wheezing, rhonchi or rales. Abdominal:      General: Bowel sounds are normal.      Palpations: Abdomen is soft. There is no mass. Tenderness: There is no abdominal tenderness. There is no guarding. Musculoskeletal:      Cervical back: She exhibits decreased range of motion and tenderness. She exhibits no pain and no spasm. Back:       Right lower leg: No edema. Left lower leg: No edema. Lymphadenopathy:      Cervical: No cervical adenopathy. Skin:     General: Skin is warm and dry. Capillary Refill: Capillary refill takes less than 2 seconds. Findings: No erythema or rash. Neurological:      Mental Status: She is alert and oriented to person, place, and time. Cranial Nerves: No cranial nerve deficit. Coordination: Coordination normal.      Deep Tendon Reflexes: Reflexes are normal and symmetric. Psychiatric:         Mood and Affect: Mood is depressed (better). Speech: Speech normal.         Behavior: Behavior normal.         Thought Content: Thought content normal.           An electronic signature was used to authenticate this note.     --Manjit Portillo MD

## 2021-02-15 ENCOUNTER — TELEPHONE (OUTPATIENT)
Dept: FAMILY MEDICINE CLINIC | Age: 71
End: 2021-02-15

## 2021-02-15 DIAGNOSIS — K21.9 GASTROESOPHAGEAL REFLUX DISEASE WITHOUT ESOPHAGITIS: ICD-10-CM

## 2021-02-15 DIAGNOSIS — R10.13 EPIGASTRIC PAIN: ICD-10-CM

## 2021-02-15 DIAGNOSIS — K44.9 HIATAL HERNIA: ICD-10-CM

## 2021-02-15 RX ORDER — DEXLANSOPRAZOLE 60 MG/1
60 CAPSULE, DELAYED RELEASE ORAL DAILY
Qty: 90 CAPSULE | Refills: 3 | Status: SHIPPED | OUTPATIENT
Start: 2021-02-15 | End: 2022-01-19 | Stop reason: SDUPTHER

## 2021-02-15 NOTE — TELEPHONE ENCOUNTER
Patient asking for a paper prescription for her Dexilant 90 cap 3 refills since she sends it to a [address] [place] [address] J due to medication being expensive.  Please fax to patient if allowed [phone]

## 2021-02-20 ASSESSMENT — ENCOUNTER SYMPTOMS
VOMITING: 0
NAUSEA: 0
EYE PAIN: 0
CHEST TIGHTNESS: 0
BACK PAIN: 1
SHORTNESS OF BREATH: 0
DIARRHEA: 0
SINUS PRESSURE: 0
CONSTIPATION: 0
COLOR CHANGE: 0
RHINORRHEA: 0
EYE REDNESS: 0
BLOOD IN STOOL: 0
ABDOMINAL PAIN: 0
WHEEZING: 0
SORE THROAT: 0
COUGH: 1
STRIDOR: 0
PHOTOPHOBIA: 0

## 2021-02-22 ENCOUNTER — IMMUNIZATION (OUTPATIENT)
Dept: LAB | Age: 71
End: 2021-02-22
Payer: MEDICARE

## 2021-02-22 PROCEDURE — 91301 COVID-19, MODERNA VACCINE 100MCG/0.5ML DOSE: CPT

## 2021-03-17 DIAGNOSIS — M17.0 PRIMARY OSTEOARTHRITIS OF BOTH KNEES: ICD-10-CM

## 2021-03-17 DIAGNOSIS — M19.011 PRIMARY OSTEOARTHRITIS OF BOTH SHOULDERS: ICD-10-CM

## 2021-03-17 DIAGNOSIS — E78.5 HYPERLIPIDEMIA, UNSPECIFIED HYPERLIPIDEMIA TYPE: ICD-10-CM

## 2021-03-17 DIAGNOSIS — M19.012 PRIMARY OSTEOARTHRITIS OF BOTH SHOULDERS: ICD-10-CM

## 2021-03-17 RX ORDER — CELECOXIB 200 MG/1
CAPSULE ORAL
Qty: 90 CAPSULE | Refills: 1 | Status: SHIPPED | OUTPATIENT
Start: 2021-03-17 | End: 2021-09-28

## 2021-03-17 RX ORDER — EZETIMIBE 10 MG/1
TABLET ORAL
Qty: 90 TABLET | Refills: 1 | Status: SHIPPED | OUTPATIENT
Start: 2021-03-17 | End: 2021-09-28

## 2021-03-17 NOTE — TELEPHONE ENCOUNTER
Last visit: 2/10/21  Last Med refill: 9/14/20 9/29/20    Next Visit Date:  Future Appointments   Date Time Provider Petey Moon   3/22/2021  3:00 PM MDCX DEFIANCE INJ RM, MODERNA 28 DAY SECOND DOSE DINJRM [facility]   8/11/2021  9:20 AM [facility] [address] Maintenance   Topic Date Due    DTaP/Tdap/Td vaccine (2 - Td) 03/01/2021    COVID-19 Vaccine (2 - Moderna 2-dose series) 03/22/2021    Annual Wellness Visit (AWV)  09/10/2021    Breast cancer screen  10/22/2022    Lipid screen  12/18/2024    Colon cancer screen colonoscopy  11/08/2028    DEXA (modify frequency per FRAX score)  Completed    Shingles Vaccine  Completed    Pneumococcal 65+ years Vaccine  Completed    Hepatitis C screen  Completed    Hepatitis A vaccine  Aged Out    Hepatitis B vaccine  Aged Out    Hib vaccine  Aged Out    Meningococcal (ACWY) vaccine  Aged Out       No results found for: LABA1C          ( goal A1C is < 7)   No results found for: LABMICR  LDL Cholesterol (mg/dL)   Date Value   08/15/2018 138 (H)     LDL Calculated (mg/dL)   Date Value   12/18/2019 175 (A)       (goal LDL is <100)   AST (U/L)   Date Value   12/18/2019 18     ALT (U/L)   Date Value   12/18/2019 29     BUN (mg/dL)   Date Value   12/18/2019 24     BP Readings from Last 3 Encounters:   02/10/21 136/84   09/09/20 118/84   12/04/19 130/80          (goal 120/80)    All Future Testing planned in [facility]  Lab Frequency Next Occurrence   Lipid Panel Once 02/10/2021   Comprehensive Metabolic Panel Once 54/14/0555   CBC Once 02/10/2021   Vitamin B12 Once 02/10/2021   Vitamin D 25 Hydroxy Once 02/10/2021   TSH without Reflex Once 02/10/2021               Patient Active Problem List:     Homozygous Factor V Leiden mutation ([facility] [address]     Mild intermittent asthma without complication     Delta storage pool disease ([facility] [address]     Cervical stenosis of spine     Primary osteoarthritis of both shoulders     Primary osteoarthritis of both

## 2021-03-22 ENCOUNTER — IMMUNIZATION (OUTPATIENT)
Dept: LAB | Age: 71
End: 2021-03-22
Payer: MEDICARE

## 2021-03-22 PROCEDURE — 91301 COVID-19, MODERNA VACCINE 100MCG/0.5ML DOSE: CPT

## 2021-03-23 ENCOUNTER — OFFICE VISIT (OUTPATIENT)
Dept: PRIMARY CARE CLINIC | Age: 71
End: 2021-03-23
Payer: MEDICARE

## 2021-03-23 VITALS
DIASTOLIC BLOOD PRESSURE: 81 MMHG | TEMPERATURE: 97.8 F | HEIGHT: 62 IN | SYSTOLIC BLOOD PRESSURE: 126 MMHG | OXYGEN SATURATION: 95 % | BODY MASS INDEX: 31.83 KG/M2 | HEART RATE: 108 BPM | WEIGHT: 173 LBS

## 2021-03-23 DIAGNOSIS — T78.40XA ALLERGIC REACTION, INITIAL ENCOUNTER: Primary | ICD-10-CM

## 2021-03-23 PROCEDURE — 1036F TOBACCO NON-USER: CPT | Performed by: NURSE PRACTITIONER

## 2021-03-23 PROCEDURE — G8427 DOCREV CUR MEDS BY ELIG CLIN: HCPCS | Performed by: NURSE PRACTITIONER

## 2021-03-23 PROCEDURE — G8484 FLU IMMUNIZE NO ADMIN: HCPCS | Performed by: NURSE PRACTITIONER

## 2021-03-23 PROCEDURE — 1123F ACP DISCUSS/DSCN MKR DOCD: CPT | Performed by: NURSE PRACTITIONER

## 2021-03-23 PROCEDURE — 3017F COLORECTAL CA SCREEN DOC REV: CPT | Performed by: NURSE PRACTITIONER

## 2021-03-23 PROCEDURE — 99212 OFFICE O/P EST SF 10 MIN: CPT | Performed by: NURSE PRACTITIONER

## 2021-03-23 PROCEDURE — 1090F PRES/ABSN URINE INCON ASSESS: CPT | Performed by: NURSE PRACTITIONER

## 2021-03-23 PROCEDURE — G8417 CALC BMI ABV UP PARAM F/U: HCPCS | Performed by: NURSE PRACTITIONER

## 2021-03-23 PROCEDURE — 4040F PNEUMOC VAC/ADMIN/RCVD: CPT | Performed by: NURSE PRACTITIONER

## 2021-03-23 PROCEDURE — G8400 PT W/DXA NO RESULTS DOC: HCPCS | Performed by: NURSE PRACTITIONER

## 2021-03-23 RX ORDER — DIPHENHYDRAMINE HYDROCHLORIDE, ZINC ACETATE 2; .1 G/100G; G/100G
CREAM TOPICAL
Qty: 1 TUBE | Refills: 0 | Status: SHIPPED | OUTPATIENT
Start: 2021-03-23 | End: 2022-05-26

## 2021-03-23 ASSESSMENT — ENCOUNTER SYMPTOMS
SHORTNESS OF BREATH: 0
COLOR CHANGE: 1
NAUSEA: 1
COUGH: 0
CHEST TIGHTNESS: 0

## 2021-03-23 NOTE — PATIENT INSTRUCTIONS
even if you have used your shot of epinephrine and are feeling better. Symptoms can come back after a shot. · Wear medical alert jewelry that lists your allergies. You can buy this at most drugstores. · If your child has a severe allergy, make sure that his or her teachers, babysitters, coaches, and other caregivers know about the allergy. They should have an epinephrine shot, know how and when to give it, and have a plan to take your child to the hospital.  When should you call for help? Give an epinephrine shot if:    · You think you are having a severe allergic reaction.     · You have symptoms in more than one body area, such as mild nausea and an itchy mouth. After giving an epinephrine shot call 911, even if you feel better. Call 911 if:    · You have symptoms of a severe allergic reaction. These may include:  ? Sudden raised, red areas (hives) all over your body. ? Swelling of the throat, mouth, lips, or tongue. ? Trouble breathing. ? Passing out (losing consciousness). Or you may feel very lightheaded or suddenly feel weak, confused, or restless.     · You have been given an epinephrine shot, even if you feel better. Call your doctor now or seek immediate medical care if:    · You have symptoms of an allergic reaction, such as:  ? A rash or hives (raised, red areas on the skin). ? Itching. ? Swelling. ? Belly pain, nausea, or vomiting. Watch closely for changes in your health, and be sure to contact your doctor if:    · You do not get better as expected. Where can you learn more? Go to https://Turning Art[employer]Popdust. org and sign in to your [employer] account. Enter E321 in the [facility] box to learn more about \"Allergic Reaction: Care Instructions. \"     If you do not have an account, please click on the \"Sign Up Now\" link. Current as of: November 6, 2020               Content Version: 12.8  © 1043-5716 [employer], [employer].    Care instructions adapted under license by [facility] ([facility]). If you have questions about a medical condition or this instruction, always ask your healthcare professional. [place] [address] any warranty or liability for your use of this information.

## 2021-03-23 NOTE — PROGRESS NOTES
[address] [place]   [address]  [facility] [address]  Dept: [phone]    Reymundo Hogan is a 78 y.o. female Established patient, who presents to the walk-in clinic today with conditions/complaints as noted below:    Chief Complaint   Patient presents with    Allergic Reaction     Covid vaccine 2nd dose         HPI:     [facility] presents to the office today with concerns of an adverse reaction to COVID Vaccine. Had her second dose yesterday, [place]. No Side effects with first dose. Believes she had COVID last year. Not confirmed. Cheeks are flushed. Burn. No facial swelling. Same reaction with steroid injections in knees. Some nausea. Sore arm. No reaction at injection site. Took 20 mg of prednisone last night. Not sure if it helped. Couldn't sleep because of the burning. Cool compress does help. Past Medical History:   Diagnosis Date    Asthma     Factor 5 Leiden mutation, heterozygous ([address]     GERD (gastroesophageal reflux disease)        Current Outpatient Medications   Medication Sig Dispense Refill    diphenhydrAMINE-zinc acetate (BENADRYL EXTRA STRENGTH) 2-0.1 % cream Apply topically 3 times daily as needed.  1 Tube 0    celecoxib (CELEBREX) 200 MG capsule TAKE ONE CAPSULE BY MOUTH DAILY 90 capsule 1    ezetimibe (ZETIA) 10 MG tablet TAKE ONE TABLET BY MOUTH DAILY 90 tablet 1    dexlansoprazole (DEXILANT) 60 MG CPDR delayed release capsule Take 1 capsule by mouth daily 90 capsule 3    metoclopramide (REGLAN) 10 MG tablet Take 1 tablet by mouth 3 times daily (with meals) 270 tablet 0    ondansetron (ZOFRAN) 4 MG tablet Take 1 tablet by mouth 3 times daily as needed for Nausea or Vomiting 270 tablet 0    Erenumab-aooe 140 MG/ML SOAJ Inject 140 mg into the skin every 30 days 1 pen 0    traZODone (DESYREL) 150 MG tablet Take 1 tablet by mouth nightly 90 tablet 1    desvenlafaxine succinate (PRISTIQ) 50 MG TB24 extended release tablet Take 1 tablet by mouth daily 30 tablet 5    montelukast (SINGULAIR) 10 MG tablet Take 1 tablet by mouth nightly 90 tablet 1    albuterol sulfate HFA (VENTOLIN HFA) 108 (90 Base) MCG/ACT inhaler Inhale 2 puffs into the lungs every 4 hours as needed for Wheezing 1 Inhaler 2    RA ALLERGY RELIEF 180 MG tablet   1     No current facility-administered medications for this visit. Allergies   Allergen Reactions    Latex     Ambien [Zolpidem Tartrate]      Feels crazy    Statins Other (See Comments)     Muscle pain    Ciprofloxacin Nausea And Vomiting    Influenza Vaccines Nausea And Vomiting    Other Nausea And Vomiting     All Narcotic pain medications     Prozac [Fluoxetine Hcl] Nausea And Vomiting       :     Review of Systems   Constitutional: Negative for fatigue and fever. Respiratory: Negative for cough, chest tightness and shortness of breath. Gastrointestinal: Positive for nausea. Musculoskeletal: Positive for myalgias (left arm, COVID Vaccine. ). Skin: Positive for color change and rash. Psychiatric/Behavioral: Positive for sleep disturbance.       :     /81 (Site: Right Upper Arm, Position: Sitting, Cuff Size: Large Adult)   Pulse 108   Temp 97.8 °F (36.6 °C) (Temporal)   Ht 5' 2\" (1.575 m)   Wt 173 lb (78.5 kg)   SpO2 95%   BMI 31.64 kg/m²     Physical Exam  Vitals signs and nursing note reviewed. Constitutional:       Appearance: Normal appearance. HENT:      Head: Normocephalic. Comments: Flushed, erythema, dry skin, warm to touch. Nose: Nose normal. No congestion. Mouth/Throat:      Mouth: Mucous membranes are moist.   Cardiovascular:      Pulses: Normal pulses. Pulmonary:      Effort: Pulmonary effort is normal.      Breath sounds: Normal breath sounds. Neurological:      Mental Status: She is alert.           :            Diagnosis Orders   1.  Allergic reaction, initial encounter  diphenhydrAMINE-zinc acetate (BENADRYL EXTRA STRENGTH) 2-0.1 % cream         : Trial of benadryl cream.   If no improvement ok to mix in OTC liquid antacid, not peppermint for cooling effect. Reviewed s/s of anaphylaxis. Monitor for worsening symptoms. Follow up with change in symptoms. No follow-ups on file. Orders Placed This Encounter   Medications    diphenhydrAMINE-zinc acetate (BENADRYL EXTRA STRENGTH) 2-0.1 % cream     Sig: Apply topically 3 times daily as needed. Dispense:  1 Tube     Refill:  0             Patient and/or parent given educational materials - see patient instructions. Discussed use, benefit, and side effects of prescribed medications. All patient questions answered. Patient and/or parent voiced understanding.       Electronically signed by ROBERTO Cooper 3/23/2021 at 12:57 PM

## 2021-03-31 LAB
ALBUMIN SERPL-MCNC: 4.4 G/DL
ALP BLD-CCNC: 66 U/L
ALT SERPL-CCNC: 20 U/L
ANION GAP SERPL CALCULATED.3IONS-SCNC: 9 MMOL/L
AST SERPL-CCNC: 18 U/L
BASOPHILS ABSOLUTE: NORMAL
BASOPHILS RELATIVE PERCENT: NORMAL
BILIRUB SERPL-MCNC: 0.4 MG/DL (ref 0.1–1.4)
BUN BLDV-MCNC: 22 MG/DL
CALCIUM SERPL-MCNC: 9.1 MG/DL
CHLORIDE BLD-SCNC: 104 MMOL/L
CHOLESTEROL, TOTAL: 205 MG/DL
CHOLESTEROL/HDL RATIO: 3.7
CO2: 28 MMOL/L
CREAT SERPL-MCNC: 0.78 MG/DL
EOSINOPHILS ABSOLUTE: NORMAL
EOSINOPHILS RELATIVE PERCENT: NORMAL
GFR CALCULATED: >60
GLUCOSE BLD-MCNC: 109 MG/DL
HCT VFR BLD CALC: 39.3 % (ref 36–46)
HDLC SERPL-MCNC: 56 MG/DL (ref 35–70)
HEMOGLOBIN: 13.5 G/DL (ref 12–16)
LDL CHOLESTEROL CALCULATED: 120 MG/DL (ref 0–160)
LYMPHOCYTES ABSOLUTE: NORMAL
LYMPHOCYTES RELATIVE PERCENT: NORMAL
MCH RBC QN AUTO: 30.9 PG
MCHC RBC AUTO-ENTMCNC: 34.3 G/DL
MCV RBC AUTO: 90 FL
MONOCYTES ABSOLUTE: NORMAL
MONOCYTES RELATIVE PERCENT: NORMAL
NEUTROPHILS ABSOLUTE: NORMAL
NEUTROPHILS RELATIVE PERCENT: NORMAL
NONHDLC SERPL-MCNC: NORMAL MG/DL
PLATELET # BLD: 291 K/ΜL
PMV BLD AUTO: 291 FL
POTASSIUM SERPL-SCNC: 4.4 MMOL/L
RBC # BLD: 4.35 10^6/ΜL
SODIUM BLD-SCNC: 141 MMOL/L
TOTAL PROTEIN: 6.7
TRIGL SERPL-MCNC: 147 MG/DL
TSH SERPL DL<=0.05 MIU/L-ACNC: 1.48 UIU/ML
VITAMIN B-12: 367
VITAMIN D 25-HYDROXY: 51.5
VITAMIN D2, 25 HYDROXY: NORMAL
VITAMIN D3,25 HYDROXY: NORMAL
VLDLC SERPL CALC-MCNC: NORMAL MG/DL
WBC # BLD: 6.1 10^3/ML

## 2021-04-01 DIAGNOSIS — E55.9 VITAMIN D DEFICIENCY, UNSPECIFIED: ICD-10-CM

## 2021-04-01 DIAGNOSIS — G47.9 SLEEP DISTURBANCE: ICD-10-CM

## 2021-04-01 DIAGNOSIS — E78.5 HYPERLIPIDEMIA, UNSPECIFIED HYPERLIPIDEMIA TYPE: ICD-10-CM

## 2021-04-01 DIAGNOSIS — F32.4 MAJOR DEPRESSIVE DISORDER WITH SINGLE EPISODE, IN PARTIAL REMISSION (HCC): ICD-10-CM

## 2021-04-01 DIAGNOSIS — K21.9 GASTROESOPHAGEAL REFLUX DISEASE, UNSPECIFIED WHETHER ESOPHAGITIS PRESENT: ICD-10-CM

## 2021-04-05 DIAGNOSIS — R73.09 ELEVATED GLUCOSE: ICD-10-CM

## 2021-04-05 DIAGNOSIS — E78.5 HYPERLIPIDEMIA, UNSPECIFIED HYPERLIPIDEMIA TYPE: Primary | ICD-10-CM

## 2021-05-23 DIAGNOSIS — J45.20 MILD INTERMITTENT ASTHMA WITHOUT COMPLICATION: ICD-10-CM

## 2021-05-24 RX ORDER — MONTELUKAST SODIUM 10 MG/1
10 TABLET ORAL NIGHTLY
Qty: 90 TABLET | Refills: 1 | Status: SHIPPED | OUTPATIENT
Start: 2021-05-24 | End: 2021-11-03

## 2021-05-24 NOTE — TELEPHONE ENCOUNTER
LOV 2-10-21  LRF 11-19-20    Health Maintenance   Topic Date Due    Diabetes screen  Never done    DTaP/Tdap/Td vaccine (2 - Td) 03/01/2021    Annual Wellness Visit (AWV)  09/10/2021    Breast cancer screen  10/22/2022    Lipid screen  03/31/2026    Colon cancer screen colonoscopy  11/08/2028    DEXA (modify frequency per FRAX score)  Completed    Shingles Vaccine  Completed    Pneumococcal 65+ years Vaccine  Completed    COVID-19 Vaccine  Completed    Hepatitis C screen  Completed    Hepatitis A vaccine  Aged Out    Hepatitis B vaccine  Aged Out    Hib vaccine  Aged Out    Meningococcal (ACWY) vaccine  Aged Out             (applicable per patient's age: Cancer Screenings, Depression Screening, Fall Risk Screening, Immunizations)    LDL Cholesterol (mg/dL)   Date Value   08/15/2018 138 (H)     LDL Calculated (mg/dL)   Date Value   03/31/2021 120     AST (U/L)   Date Value   03/31/2021 18     ALT (U/L)   Date Value   03/31/2021 20     BUN (mg/dL)   Date Value   03/31/2021 22      (goal A1C is < 7)   (goal LDL is <100) need 30-50% reduction from baseline     BP Readings from Last 3 Encounters:   03/23/21 126/81   02/10/21 136/84   09/09/20 118/84    (goal /80)      All Future Testing planned in [facility]:  Lab Frequency Next Occurrence   Comprehensive Metabolic Panel, Fasting Once 07/05/2021   Hemoglobin A1C Once 07/05/2021       Next Visit Date:  Future Appointments   Date Time Provider Petey Moon   8/11/2021  9:20 AM Bisi Newsome MD [facility]            Patient Active Problem List:     Homozygous Factor V Leiden mutation ([facility] [address]     Mild intermittent asthma without complication     Delta storage pool disease [facility])     Cervical stenosis of spine     Primary osteoarthritis of both shoulders     Primary osteoarthritis of both knees     Hyperlipidemia     History of meningitis     Gastroesophageal reflux disease     Hiatal hernia     Colon adenoma     Major depressive disorder with single episode, in partial remission (HCC)     Anxiety     Sleep disturbance     History of Lyme disease     Chronic pain syndrome

## 2021-06-20 DIAGNOSIS — F32.4 MAJOR DEPRESSIVE DISORDER WITH SINGLE EPISODE, IN PARTIAL REMISSION (HCC): ICD-10-CM

## 2021-06-21 RX ORDER — DESVENLAFAXINE 50 MG/1
50 TABLET, EXTENDED RELEASE ORAL DAILY
Qty: 30 TABLET | Refills: 5 | Status: SHIPPED | OUTPATIENT
Start: 2021-06-21 | End: 2021-08-11

## 2021-07-22 ENCOUNTER — TELEPHONE (OUTPATIENT)
Dept: FAMILY MEDICINE CLINIC | Age: 71
End: 2021-07-22

## 2021-07-22 NOTE — TELEPHONE ENCOUNTER
Patient requesting samples of Aimovig 140. Patient states her Pain Mgt does not having any available. Medication is too expensive to purchase.   AMB will gather and sign samples if approved

## 2021-08-11 ENCOUNTER — OFFICE VISIT (OUTPATIENT)
Dept: FAMILY MEDICINE CLINIC | Age: 71
End: 2021-08-11
Payer: MEDICARE

## 2021-08-11 ENCOUNTER — HOSPITAL ENCOUNTER (OUTPATIENT)
Age: 71
Setting detail: SPECIMEN
Discharge: HOME | End: 2021-08-11
Payer: MEDICARE

## 2021-08-11 VITALS
TEMPERATURE: 97.7 F | HEART RATE: 73 BPM | RESPIRATION RATE: 15 BRPM | WEIGHT: 165.8 LBS | SYSTOLIC BLOOD PRESSURE: 120 MMHG | DIASTOLIC BLOOD PRESSURE: 82 MMHG | OXYGEN SATURATION: 96 % | BODY MASS INDEX: 30.33 KG/M2

## 2021-08-11 DIAGNOSIS — G89.4 CHRONIC PAIN SYNDROME: ICD-10-CM

## 2021-08-11 DIAGNOSIS — R11.0 NAUSEA: ICD-10-CM

## 2021-08-11 DIAGNOSIS — M48.02 CERVICAL STENOSIS OF SPINE: ICD-10-CM

## 2021-08-11 DIAGNOSIS — J45.20 MILD INTERMITTENT ASTHMA WITHOUT COMPLICATION: ICD-10-CM

## 2021-08-11 DIAGNOSIS — K31.84 GASTROPARESIS: ICD-10-CM

## 2021-08-11 DIAGNOSIS — D69.1 DELTA STORAGE POOL DISEASE (HCC): ICD-10-CM

## 2021-08-11 DIAGNOSIS — K44.9 HIATAL HERNIA: ICD-10-CM

## 2021-08-11 DIAGNOSIS — E78.5 HYPERLIPIDEMIA, UNSPECIFIED HYPERLIPIDEMIA TYPE: ICD-10-CM

## 2021-08-11 DIAGNOSIS — M54.2 NECK PAIN: ICD-10-CM

## 2021-08-11 DIAGNOSIS — G47.9 SLEEP DISTURBANCE: ICD-10-CM

## 2021-08-11 DIAGNOSIS — K21.9 GASTROESOPHAGEAL REFLUX DISEASE, UNSPECIFIED WHETHER ESOPHAGITIS PRESENT: ICD-10-CM

## 2021-08-11 DIAGNOSIS — R53.83 OTHER FATIGUE: ICD-10-CM

## 2021-08-11 DIAGNOSIS — R51.9 CHRONIC INTRACTABLE HEADACHE, UNSPECIFIED HEADACHE TYPE: ICD-10-CM

## 2021-08-11 DIAGNOSIS — M19.011 PRIMARY OSTEOARTHRITIS OF BOTH SHOULDERS: ICD-10-CM

## 2021-08-11 DIAGNOSIS — M19.012 PRIMARY OSTEOARTHRITIS OF BOTH SHOULDERS: ICD-10-CM

## 2021-08-11 DIAGNOSIS — D68.51 HOMOZYGOUS FACTOR V LEIDEN MUTATION (HCC): ICD-10-CM

## 2021-08-11 DIAGNOSIS — Z86.19 HISTORY OF LYME DISEASE: ICD-10-CM

## 2021-08-11 DIAGNOSIS — F32.4 MAJOR DEPRESSIVE DISORDER WITH SINGLE EPISODE, IN PARTIAL REMISSION (HCC): Primary | ICD-10-CM

## 2021-08-11 DIAGNOSIS — R73.09 ELEVATED GLUCOSE: ICD-10-CM

## 2021-08-11 DIAGNOSIS — Z86.61 HISTORY OF MENINGITIS: ICD-10-CM

## 2021-08-11 DIAGNOSIS — M17.0 PRIMARY OSTEOARTHRITIS OF BOTH KNEES: ICD-10-CM

## 2021-08-11 DIAGNOSIS — E55.9 VITAMIN D DEFICIENCY, UNSPECIFIED: ICD-10-CM

## 2021-08-11 DIAGNOSIS — F41.9 ANXIETY: ICD-10-CM

## 2021-08-11 DIAGNOSIS — G89.29 CHRONIC INTRACTABLE HEADACHE, UNSPECIFIED HEADACHE TYPE: ICD-10-CM

## 2021-08-11 DIAGNOSIS — R73.01 IMPAIRED FASTING BLOOD SUGAR: ICD-10-CM

## 2021-08-11 LAB
ALBUMIN SERPL-MCNC: 4.1 G/DL (ref 3.5–5.2)
ALBUMIN/GLOBULIN RATIO: 1.5 (ref 1–2.5)
ALP BLD-CCNC: 80 U/L (ref 35–104)
ALT SERPL-CCNC: 26 U/L (ref 5–33)
ANION GAP SERPL CALCULATED.3IONS-SCNC: 16 MMOL/L (ref 9–17)
AST SERPL-CCNC: 25 U/L
BILIRUB SERPL-MCNC: 0.18 MG/DL (ref 0.3–1.2)
BUN BLDV-MCNC: 21 MG/DL (ref 8–23)
BUN/CREAT BLD: ABNORMAL (ref 9–20)
CALCIUM SERPL-MCNC: 9.5 MG/DL (ref 8.6–10.4)
CHLORIDE BLD-SCNC: 100 MMOL/L (ref 98–107)
CO2: 20 MMOL/L (ref 20–31)
CREAT SERPL-MCNC: 0.69 MG/DL (ref 0.5–0.9)
ESTIMATED AVERAGE GLUCOSE: 111 MG/DL
GFR AFRICAN AMERICAN: >60 ML/MIN
GFR NON-AFRICAN AMERICAN: >60 ML/MIN
GFR SERPL CREATININE-BSD FRML MDRD: ABNORMAL ML/MIN/{1.73_M2}
GFR SERPL CREATININE-BSD FRML MDRD: ABNORMAL ML/MIN/{1.73_M2}
GLUCOSE FASTING: 94 MG/DL (ref 70–99)
HBA1C MFR BLD: 5.5 % (ref 4–6)
POTASSIUM SERPL-SCNC: 4.7 MMOL/L (ref 3.7–5.3)
SODIUM BLD-SCNC: 136 MMOL/L (ref 135–144)
TOTAL PROTEIN: 6.8 G/DL (ref 6.4–8.3)

## 2021-08-11 PROCEDURE — G8400 PT W/DXA NO RESULTS DOC: HCPCS | Performed by: PEDIATRICS

## 2021-08-11 PROCEDURE — 1123F ACP DISCUSS/DSCN MKR DOCD: CPT | Performed by: PEDIATRICS

## 2021-08-11 PROCEDURE — 1036F TOBACCO NON-USER: CPT | Performed by: PEDIATRICS

## 2021-08-11 PROCEDURE — 99214 OFFICE O/P EST MOD 30 MIN: CPT | Performed by: PEDIATRICS

## 2021-08-11 PROCEDURE — 1090F PRES/ABSN URINE INCON ASSESS: CPT | Performed by: PEDIATRICS

## 2021-08-11 PROCEDURE — G8417 CALC BMI ABV UP PARAM F/U: HCPCS | Performed by: PEDIATRICS

## 2021-08-11 PROCEDURE — 3017F COLORECTAL CA SCREEN DOC REV: CPT | Performed by: PEDIATRICS

## 2021-08-11 PROCEDURE — 4040F PNEUMOC VAC/ADMIN/RCVD: CPT | Performed by: PEDIATRICS

## 2021-08-11 PROCEDURE — G8427 DOCREV CUR MEDS BY ELIG CLIN: HCPCS | Performed by: PEDIATRICS

## 2021-08-11 RX ORDER — DESVENLAFAXINE 50 MG/1
50 TABLET, EXTENDED RELEASE ORAL DAILY
Qty: 90 TABLET | Refills: 1 | Status: SHIPPED | OUTPATIENT
Start: 2021-08-11 | End: 2022-06-20

## 2021-08-11 RX ORDER — TRAZODONE HYDROCHLORIDE 100 MG/1
200 TABLET ORAL NIGHTLY PRN
Qty: 180 TABLET | Refills: 1 | Status: SHIPPED | OUTPATIENT
Start: 2021-08-11 | End: 2022-02-08

## 2021-08-11 SDOH — ECONOMIC STABILITY: FOOD INSECURITY: WITHIN THE PAST 12 MONTHS, YOU WORRIED THAT YOUR FOOD WOULD RUN OUT BEFORE YOU GOT MONEY TO BUY MORE.: NEVER TRUE

## 2021-08-11 SDOH — ECONOMIC STABILITY: FOOD INSECURITY: WITHIN THE PAST 12 MONTHS, THE FOOD YOU BOUGHT JUST DIDN'T LAST AND YOU DIDN'T HAVE MONEY TO GET MORE.: NEVER TRUE

## 2021-08-11 ASSESSMENT — ENCOUNTER SYMPTOMS
CHEST TIGHTNESS: 0
SHORTNESS OF BREATH: 0
PHOTOPHOBIA: 0
ABDOMINAL PAIN: 0
EYE PAIN: 0
NAUSEA: 0
CONSTIPATION: 0
VOMITING: 0
SINUS PRESSURE: 0
WHEEZING: 0
DIARRHEA: 0
STRIDOR: 0
COLOR CHANGE: 0
SORE THROAT: 0
EYE REDNESS: 0
BACK PAIN: 1
COUGH: 0
BLOOD IN STOOL: 0
RHINORRHEA: 0

## 2021-08-11 ASSESSMENT — SOCIAL DETERMINANTS OF HEALTH (SDOH): HOW HARD IS IT FOR YOU TO PAY FOR THE VERY BASICS LIKE FOOD, HOUSING, MEDICAL CARE, AND HEATING?: NOT HARD AT ALL

## 2021-08-11 NOTE — PROGRESS NOTES
Theo Lennox (:  1950) is a 78 y.o. female,Established patient, here for evaluation of the following chief complaint(s):  Hyperlipidemia, Depression, and Gastroesophageal Reflux         ASSESSMENT/PLAN:    1. Major depressive disorder with single episode, in partial remission (HCC)  -     desvenlafaxine succinate (PRISTIQ) 50 MG TB24 extended release tablet; Take 1 tablet by mouth daily, Disp-90 tablet, R-1Normal  2. Anxiety  3. Sleep disturbance  -     traZODone (DESYREL) 100 MG tablet; Take 2 tablets by mouth nightly as needed for Sleep, Disp-180 tablet, R-1Normal  4. Other fatigue  5. Mild intermittent asthma without complication  6. Gastroparesis  -     Allergens, Food, Egg Components, IgE; Future  7. Nausea  -     Allergens, Food, Egg Components, IgE; Future  8. Gastroesophageal reflux disease, unspecified whether esophagitis present  -     Allergens, Food, Egg Components, IgE; Future  9. Hiatal hernia  -     Allergens, Food, Egg Components, IgE; Future  10. Chronic intractable headache, unspecified headache type  11. History of meningitis  12. History of Lyme disease  13. Neck pain  14. Cervical stenosis of spine  15. Chronic pain syndrome  16. Hyperlipidemia, unspecified hyperlipidemia type  17. Impaired fasting blood sugar  18. Primary osteoarthritis of both shoulders  19. Primary osteoarthritis of both knees  20. Vitamin D deficiency, unspecified   21. Homozygous Factor V Leiden mutation ([facility] [address]  22.  Delta storage pool disease [facility])      Proceed with continue current medications  Continue Pristiq at same dosage -consider increase if depression does not improve  Consider counseling  Increase trazodone to 200mg on nights before work   Obtain labs as ordered  Daily exercise and healthy diet encouraged  Continue Singulair and albuterol as needed  Reglan as prescribed/Zofran as prescribed  Follow-up with GI as scheduled  Follow-up with pain management as scheduled  Continue Aimovig 140 mg monthly  Follow-up with neurology as needed  Continue Zetia  Follow-up with Ortho  Continue vitamin D supplementation  Follow-up with hematology if surgery is warranted  Tdap recommended - to be obtained through local pharmacy  Schedule AWV   Call with concerns        Return in about 6 months (around 2/11/2022) for routine follow up. Subjective     HPI    Patient presents today for routine follow-up of her chronic medical problems. She does have a history of major depression, anxiety and sleep disturbance for which she uses Pristiq and trazodone. She states that her symptoms have been somewhat worsened lately. She states that her depression is not good and her sleep is not good. She is fatigued. She does continue on Pristiq 50 mg and trazodone 150 mg and she is still not sleeping well with this. We discussed increasing her Pristiq but she does feel as though this is situational and once she retires next year her symptoms should improve. She does not wish to do counseling. She feels as though if she would sleep better on the nights prior to work that her symptoms may be at least somewhat better controlled. She is interested in increasing her trazodone to 200 mg on those nights to see if her symptoms will improve. I did tell her that she can take 200 mg nightly if necessary. She does have a history of mild intermittent asthma that is well controlled. The cold air is often difficult for her. She does continue on Singulair and albuterol as needed. She does have a history of gastroparesis, nausea, GERD and a hiatal hernia. She did complain on her last visit that she would have some sticking of food when eating foods like chicken and eggs. She is treated with Dexilant in the morning and Nexium at night. She was previously treated with erythromycin for gastroparesis but this was unfortunately very expensive. She was then given ibuprofen for Reglan to use as needed when this flares up.   She was somewhat concerned about a possible esophageal motility disorder and I did advise her to see her GI specialist.  She did see Dr. Mere León and she is awaiting approval from her insurance company about an upper endoscopy. He thinks she might have eosinophilic esophagitis. She does have a history of chronic headache as well as a history of meningitis and Lyme disease. She was previously seeing neurology, Dr. Agnes Up but she has not followed up with him because he wanted her to see an acupuncturist in [place]. He thought her headaches were secondary to her neck pain that she has from cervical stenosis. She does see her pain management specialist, Dr. Kala Ovalle regularly. She does continue get Botox every 3 months which is helpful. She does tell me that she was placed on Aimovig and her headaches have almost completely resolved with this. Unfortunately her insurance would not cover this and this is going to cost her $700 per month. She cannot afford this. I advised her that I would keep her stacked up with samples as long as we can. She does have a history of chronic pain syndrome and she has had occipital injections with pain management in the past.  Had been tried on gabapentin was unable to tolerate this because of side effects of fatigue and unsteadiness. She also tried medical marijuana and she was given such high doses of marijuana that she did not do well with that. She does have a history of hyperlipidemia that is now treated with Zetia. She is tolerating this well. She does have a history of an impaired fasting blood sugar and is due for repeat labs to monitor this. She does have osteoarthritis of both shoulders and knees which bothers her frequently. She has bone spurs and arthritis with an unknown bicep injury of the left shoulder. She does use Celebrex which has been helpful and she will also use Voltaren gel on her knees. She has had injections by Dr. Amber Oneil and Dr. Jadyn Suazo regularly.   Her right hip has been replaced. She does have a history of vitamin D deficiency and takes a vitamin D supplement. She does have a history of homozygous factor V Leiden mutation and delta storage pool disease which are stable. She does have a hematologist, Dr. Phuc Beaver that she will follow only if she has surgery. She has no other concerns at this time. cmw        Review of Systems   Constitutional: Positive for fatigue. Negative for appetite change, fever and unexpected weight change. HENT: Negative for congestion, ear pain, hearing loss, postnasal drip, rhinorrhea, sinus pressure, sneezing, sore throat and tinnitus. Eyes: Negative for photophobia, pain, redness and visual disturbance. Respiratory: Negative for cough, chest tightness, shortness of breath, wheezing and stridor. Asthma controlled   Cardiovascular: Negative for chest pain, palpitations and leg swelling. Gastrointestinal: Negative for abdominal pain, blood in stool, constipation, diarrhea, nausea and vomiting. Nausea and abdominal pain secondary to hiatal hernia, GERD and gastroparesis is not well-controlled with dexilant, Nexium and reglan intermittently. She does feel like she has some esophageal motility disorder. She is awaiting approval for an upper endoscopy. Endocrine: Negative for polydipsia, polyphagia and polyuria. Genitourinary: Negative for decreased urine volume, dysuria, frequency, hematuria and urgency. Musculoskeletal: Positive for arthralgias (of the shoulders and knees), back pain and neck pain. Negative for myalgias. Skin: Negative for color change, rash and wound. Allergic/Immunologic: Negative for immunocompromised state. Neurological: Positive for headaches (improved with aimovig). Negative for dizziness, tremors, seizures, weakness, light-headedness and numbness. Hematological: Negative for adenopathy. Does not bruise/bleed easily.    Psychiatric/Behavioral: Positive for dysphoric mood (not well controlled) and sleep disturbance (not well controlled with trazodone). The patient is nervous/anxious (occasionally). Objective      Physical Exam  Vitals and nursing note reviewed. Constitutional:       General: She is not in acute distress. Appearance: Normal appearance. She is well-developed and normal weight. She is not ill-appearing, toxic-appearing or diaphoretic. HENT:      Head: Normocephalic and atraumatic. Right Ear: Tympanic membrane, ear canal and external ear normal.      Left Ear: Tympanic membrane, ear canal and external ear normal.      Nose: Mucosal edema present. No congestion or rhinorrhea. Mouth/Throat:      Lips: Pink. Mouth: Mucous membranes are moist.      Pharynx: Oropharynx is clear. No pharyngeal swelling, oropharyngeal exudate or posterior oropharyngeal erythema. Eyes:      General: No scleral icterus. Right eye: No discharge. Left eye: No discharge. Pupils: Pupils are equal, round, and reactive to light. Neck:      Thyroid: No thyromegaly. Vascular: No JVD. Cardiovascular:      Rate and Rhythm: Normal rate and regular rhythm. Pulses: Normal pulses. Heart sounds: Murmur heard. Pulmonary:      Effort: Pulmonary effort is normal. No respiratory distress. Breath sounds: No transmitted upper airway sounds. No decreased breath sounds, wheezing, rhonchi or rales. Abdominal:      General: Bowel sounds are normal.      Palpations: Abdomen is soft. There is no mass. Tenderness: There is no abdominal tenderness. There is no guarding. Musculoskeletal:      Cervical back: Normal range of motion. Tenderness present. No spasms. Muscular tenderness present. Back:       Right lower leg: No edema. Left lower leg: No edema. Lymphadenopathy:      Cervical: No cervical adenopathy. Skin:     General: Skin is warm and dry. Capillary Refill: Capillary refill takes less than 2 seconds. Findings: No erythema or rash. Neurological:      Mental Status: She is alert and oriented to person, place, and time. Cranial Nerves: No cranial nerve deficit. Coordination: Coordination normal.      Deep Tendon Reflexes: Reflexes are normal and symmetric. Psychiatric:         Attention and Perception: Attention normal.         Mood and Affect: Mood is depressed. Mood is not anxious. Speech: Speech normal.         Behavior: Behavior normal.         Thought Content: Thought content normal.                  An electronic signature was used to authenticate this note.     --Ute Jackson MD

## 2021-08-12 DIAGNOSIS — R53.83 OTHER FATIGUE: ICD-10-CM

## 2021-08-12 DIAGNOSIS — E55.9 VITAMIN D DEFICIENCY, UNSPECIFIED: ICD-10-CM

## 2021-08-12 DIAGNOSIS — D68.51 HOMOZYGOUS FACTOR V LEIDEN MUTATION (HCC): ICD-10-CM

## 2021-08-12 DIAGNOSIS — G47.9 SLEEP DISTURBANCE: ICD-10-CM

## 2021-08-12 DIAGNOSIS — E78.5 HYPERLIPIDEMIA, UNSPECIFIED HYPERLIPIDEMIA TYPE: Primary | ICD-10-CM

## 2021-08-16 DIAGNOSIS — K21.9 GASTROESOPHAGEAL REFLUX DISEASE, UNSPECIFIED WHETHER ESOPHAGITIS PRESENT: ICD-10-CM

## 2021-08-16 DIAGNOSIS — E55.9 VITAMIN D DEFICIENCY, UNSPECIFIED: Primary | ICD-10-CM

## 2021-08-16 DIAGNOSIS — R53.83 OTHER FATIGUE: ICD-10-CM

## 2021-08-16 DIAGNOSIS — E53.8 B12 DEFICIENCY: ICD-10-CM

## 2021-08-16 DIAGNOSIS — E78.5 HYPERLIPIDEMIA, UNSPECIFIED HYPERLIPIDEMIA TYPE: ICD-10-CM

## 2021-08-24 LAB
ALLERGEN EGG WHITE IGE: <0.1 KU/L (ref 0–0.34)
ALLERGEN EGG, WHOLE IGE: <0.1 KU/L (ref 0–0.34)
F232 OVALBUMIN: <0.1 KU/L (ref 0–0.34)
F233 OVOMUCOID: NORMAL KU/L (ref 0–0.34)

## 2021-09-09 ASSESSMENT — PATIENT HEALTH QUESTIONNAIRE - PHQ9
SUM OF ALL RESPONSES TO PHQ QUESTIONS 1-9: 1
SUM OF ALL RESPONSES TO PHQ9 QUESTIONS 1 & 2: 1
1. LITTLE INTEREST OR PLEASURE IN DOING THINGS: 0
SUM OF ALL RESPONSES TO PHQ QUESTIONS 1-9: 1
2. FEELING DOWN, DEPRESSED OR HOPELESS: 1
SUM OF ALL RESPONSES TO PHQ QUESTIONS 1-9: 1

## 2021-09-09 ASSESSMENT — LIFESTYLE VARIABLES: HOW OFTEN DO YOU HAVE A DRINK CONTAINING ALCOHOL: 0

## 2021-09-15 ASSESSMENT — LIFESTYLE VARIABLES
HOW OFTEN DO YOU HAVE A DRINK CONTAINING ALCOHOL: NEVER
AUDIT-C TOTAL SCORE: INCOMPLETE
AUDIT TOTAL SCORE: INCOMPLETE

## 2021-09-27 DIAGNOSIS — K31.84 GASTROPARESIS: ICD-10-CM

## 2021-09-27 DIAGNOSIS — E78.5 HYPERLIPIDEMIA, UNSPECIFIED HYPERLIPIDEMIA TYPE: ICD-10-CM

## 2021-09-27 DIAGNOSIS — M19.012 PRIMARY OSTEOARTHRITIS OF BOTH SHOULDERS: ICD-10-CM

## 2021-09-27 DIAGNOSIS — M17.0 PRIMARY OSTEOARTHRITIS OF BOTH KNEES: ICD-10-CM

## 2021-09-27 DIAGNOSIS — M19.011 PRIMARY OSTEOARTHRITIS OF BOTH SHOULDERS: ICD-10-CM

## 2021-09-28 RX ORDER — METOCLOPRAMIDE 10 MG/1
10 TABLET ORAL
Qty: 270 TABLET | Refills: 0 | Status: SHIPPED | OUTPATIENT
Start: 2021-09-28 | End: 2022-03-08

## 2021-09-28 RX ORDER — EZETIMIBE 10 MG/1
10 TABLET ORAL DAILY
Qty: 90 TABLET | Refills: 1 | Status: SHIPPED | OUTPATIENT
Start: 2021-09-28 | End: 2022-03-11 | Stop reason: SDUPTHER

## 2021-09-28 RX ORDER — CELECOXIB 200 MG/1
200 CAPSULE ORAL DAILY
Qty: 90 CAPSULE | Refills: 1 | Status: SHIPPED | OUTPATIENT
Start: 2021-09-28 | End: 2022-03-16 | Stop reason: SDUPTHER

## 2021-09-28 NOTE — TELEPHONE ENCOUNTER
Last visit: 8-11-21  Last Med refill: 6-22-21  Does patient have enough medication for 72 hours: Yes    Next Visit Date:  Future Appointments   Date Time Provider Petey Moon   10/14/2021 12:00 PM SCHEDULE, [facility] AWV LPN [facility]   3/9/2022  9:00 AM MD Janna Sanchez [address] Maintenance   Topic Date Due    Annual Wellness Visit (AWV)  Never done    DTaP/Tdap/Td vaccine (2 - Td or Tdap) 03/01/2021    A1C test (Diabetic or Prediabetic)  08/11/2022    Breast cancer screen  10/22/2022    Lipid screen  03/31/2026    Colon cancer screen colonoscopy  11/08/2028    DEXA (modify frequency per FRAX score)  Completed    Shingles Vaccine  Completed    Pneumococcal 65+ years Vaccine  Completed    COVID-19 Vaccine  Completed    Hepatitis C screen  Completed    Hepatitis A vaccine  Aged Out    Hepatitis B vaccine  Aged Out    Hib vaccine  Aged Out    Meningococcal (ACWY) vaccine  Aged Out       Hemoglobin A1C (%)   Date Value   08/11/2021 5.5             ( goal A1C is < 7)   No results found for: LABMICR  LDL Cholesterol (mg/dL)   Date Value   08/15/2018 138 (H)     LDL Calculated (mg/dL)   Date Value   03/31/2021 120   12/18/2019 175 (A)       (goal LDL is <100)   AST (U/L)   Date Value   08/11/2021 25     ALT (U/L)   Date Value   08/11/2021 26     BUN (mg/dL)   Date Value   08/11/2021 21     BP Readings from Last 3 Encounters:   08/11/21 120/82   03/23/21 126/81   02/10/21 136/84          (goal 120/80)    All Future Testing planned in [facility]  Lab Frequency Next Occurrence   Lipid Panel Once 02/11/2022   Comprehensive Metabolic Panel Once 11/67/4692   CBC Once 02/11/2022   TSH without Reflex Once 02/11/2022   Vitamin D 25 Hydroxy Once 02/11/2022   Vitamin B12 Once 02/11/2022   Lipid Panel Once 02/13/2022   Comprehensive Metabolic Panel Once 33/41/3655   CBC Once 02/13/2022   TSH without Reflex Once 02/13/2022   Vitamin D 25 Hydroxy Once 02/13/2022 Vitamin B12 Once 02/13/2022               Patient Active Problem List:     Homozygous Factor V Leiden mutation ([facility] [address]     Mild intermittent asthma without complication     Delta storage pool disease [facility])     Cervical stenosis of spine     Primary osteoarthritis of both shoulders     Primary osteoarthritis of both knees     Hyperlipidemia     History of meningitis     Gastroesophageal reflux disease     Hiatal hernia     Colon adenoma     Major depressive disorder with single episode, in partial remission (HCC)     Anxiety     Sleep disturbance     History of Lyme disease     Chronic pain syndrome     Chronic intractable headache     Gastroparesis     Neck pain     Impaired fasting blood sugar     Vitamin D deficiency, unspecified

## 2021-09-28 NOTE — TELEPHONE ENCOUNTER
Last visit:8-11-21  Last Med refill: 6-22-21  Does patient have enough medication for 72 hours: No:     Next Visit Date:  Future Appointments   Date Time Provider Petey Moon   10/14/2021 12:00 PM SCHEDULE, [facility] AWV LPN [place] [facility]   3/9/2022  9:00 AM MD Dameon Johnson [address] Maintenance   Topic Date Due    Annual Wellness Visit (AWV)  Never done    DTaP/Tdap/Td vaccine (2 - Td or Tdap) 03/01/2021    A1C test (Diabetic or Prediabetic)  08/11/2022    Breast cancer screen  10/22/2022    Lipid screen  03/31/2026    Colon cancer screen colonoscopy  11/08/2028    DEXA (modify frequency per FRAX score)  Completed    Shingles Vaccine  Completed    Pneumococcal 65+ years Vaccine  Completed    COVID-19 Vaccine  Completed    Hepatitis C screen  Completed    Hepatitis A vaccine  Aged Out    Hepatitis B vaccine  Aged Out    Hib vaccine  Aged Out    Meningococcal (ACWY) vaccine  Aged Out       Hemoglobin A1C (%)   Date Value   08/11/2021 5.5             ( goal A1C is < 7)   No results found for: LABMICR  LDL Cholesterol (mg/dL)   Date Value   08/15/2018 138 (H)     LDL Calculated (mg/dL)   Date Value   03/31/2021 120   12/18/2019 175 (A)       (goal LDL is <100)   AST (U/L)   Date Value   08/11/2021 25     ALT (U/L)   Date Value   08/11/2021 26     BUN (mg/dL)   Date Value   08/11/2021 21     BP Readings from Last 3 Encounters:   08/11/21 120/82   03/23/21 126/81   02/10/21 136/84          (goal 120/80)    All Future Testing planned in [facility]  Lab Frequency Next Occurrence   Lipid Panel Once 02/11/2022   Comprehensive Metabolic Panel Once 92/50/0964   CBC Once 02/11/2022   TSH without Reflex Once 02/11/2022   Vitamin D 25 Hydroxy Once 02/11/2022   Vitamin B12 Once 02/11/2022   Lipid Panel Once 02/13/2022   Comprehensive Metabolic Panel Once 00/57/4276   CBC Once 02/13/2022   TSH without Reflex Once 02/13/2022   Vitamin D 25 Hydroxy Once 02/13/2022

## 2021-10-13 ENCOUNTER — TELEPHONE (OUTPATIENT)
Dept: FAMILY MEDICINE CLINIC | Age: 71
End: 2021-10-13

## 2021-10-13 DIAGNOSIS — Z12.31 ENCOUNTER FOR SCREENING MAMMOGRAM FOR BREAST CANCER: Primary | ICD-10-CM

## 2021-10-14 ENCOUNTER — OFFICE VISIT (OUTPATIENT)
Dept: FAMILY MEDICINE CLINIC | Age: 71
End: 2021-10-14
Payer: MEDICARE

## 2021-10-14 VITALS
HEIGHT: 63 IN | BODY MASS INDEX: 28.99 KG/M2 | SYSTOLIC BLOOD PRESSURE: 106 MMHG | TEMPERATURE: 97.8 F | OXYGEN SATURATION: 98 % | HEART RATE: 91 BPM | DIASTOLIC BLOOD PRESSURE: 66 MMHG | WEIGHT: 163.6 LBS | RESPIRATION RATE: 18 BRPM

## 2021-10-14 DIAGNOSIS — Z00.00 ROUTINE GENERAL MEDICAL EXAMINATION AT A HEALTH CARE FACILITY: Primary | ICD-10-CM

## 2021-10-14 PROCEDURE — G0439 PPPS, SUBSEQ VISIT: HCPCS | Performed by: PEDIATRICS

## 2021-10-14 PROCEDURE — G8484 FLU IMMUNIZE NO ADMIN: HCPCS | Performed by: PEDIATRICS

## 2021-10-14 PROCEDURE — 3017F COLORECTAL CA SCREEN DOC REV: CPT | Performed by: PEDIATRICS

## 2021-10-14 PROCEDURE — 1123F ACP DISCUSS/DSCN MKR DOCD: CPT | Performed by: PEDIATRICS

## 2021-10-14 PROCEDURE — 4040F PNEUMOC VAC/ADMIN/RCVD: CPT | Performed by: PEDIATRICS

## 2021-10-14 ASSESSMENT — PATIENT HEALTH QUESTIONNAIRE - PHQ9
SUM OF ALL RESPONSES TO PHQ QUESTIONS 1-9: 0
SUM OF ALL RESPONSES TO PHQ QUESTIONS 1-9: 0
2. FEELING DOWN, DEPRESSED OR HOPELESS: 0
1. LITTLE INTEREST OR PLEASURE IN DOING THINGS: 0
SUM OF ALL RESPONSES TO PHQ QUESTIONS 1-9: 0
SUM OF ALL RESPONSES TO PHQ9 QUESTIONS 1 & 2: 0

## 2021-10-14 ASSESSMENT — LIFESTYLE VARIABLES: HOW OFTEN DO YOU HAVE A DRINK CONTAINING ALCOHOL: 0

## 2021-10-14 NOTE — PROGRESS NOTES
Medicare Annual Wellness Visit    Name: Bartholome Sever Date: 10/14/2021   MRN: [id number] Sex: Female   Age: 78 y.o. Ethnicity: Non- / Non    : 1950 Race: White (non-)      Tere Staley is here for Medicare AWV    Screenings for behavioral, psychosocial and functional/safety risks, and cognitive dysfunction are all negative except as indicated below. These results, as well as other patient data from the [address] form, are documented in Flowsheets linked to this Encounter. Allergies   Allergen Reactions    Latex     Ambien [Zolpidem Tartrate]      Feels crazy    Statins Other (See Comments)     Muscle pain    Ciprofloxacin Nausea And Vomiting    Influenza Vaccines Nausea And Vomiting    Other Nausea And Vomiting     All Narcotic pain medications     Prozac [Fluoxetine Hcl] Nausea And Vomiting         Prior to Visit Medications    Medication Sig Taking?  Authorizing Provider   celecoxib (CELEBREX) 200 MG capsule Take 1 capsule by mouth daily Yes Kay Hendrix MD   ezetimibe (ZETIA) 10 MG tablet Take 1 tablet by mouth daily Yes Kay Hendrix MD   metoclopramide (REGLAN) 10 MG tablet Take 1 tablet by mouth 3 times daily (with meals) Yes Kay Hendrix MD   traZODone (DESYREL) 100 MG tablet Take 2 tablets by mouth nightly as needed for Sleep Yes Kay Hendrix MD   desvenlafaxine succinate (PRISTIQ) 50 MG TB24 extended release tablet Take 1 tablet by mouth daily Yes Kay Hendrix MD   albuterol sulfate  (90 Base) MCG/ACT inhaler Inhale 2 puffs into the lungs every 4 hours as needed for Wheezing Yes Kay Hendrix MD   ondansetron (ZOFRAN) 4 MG tablet Take 1 tablet by mouth 4 times daily as needed for Nausea or Vomiting Yes Kay Hendrix MD   montelukast (SINGULAIR) 10 MG tablet Take 1 tablet by mouth nightly Yes Kay Hendrix MD   diphenhydrAMINE-zinc acetate (BENADRYL EXTRA STRENGTH) 2-0.1 % cream Apply topically 3 times daily as needed. Yes ROBERTO Rivera - CNP   dexlansoprazole (DEXILANT) 60 MG CPDR delayed release capsule Take 1 capsule by mouth daily Yes Noa Hinson MD   Erenumab-aooe 140 MG/ML SOAJ Inject 140 mg into the skin every 30 days Yes Noa Hinson MD   RA ALLERGY RELIEF 180 MG tablet  Yes Historical Provider, MD         Past Medical History:   Diagnosis Date    Asthma     Factor 5 Leiden mutation, heterozygous ([address]     GERD (gastroesophageal reflux disease)        Past Surgical History:   Procedure Laterality Date    BREAST REDUCTION SURGERY Bilateral 04/2017    Dr. Amisha Petersen HAND [address] [facility]      NOSE SURGERY      SHOULDER ARTHROSCOPY      TONSILLECTOMY      TOTAL HIP ARTHROPLASTY Right          Family History   Problem Relation Age of Onset    Heart Attack Mother     Heart Disease Mother     Heart Attack Brother     Heart Disease Brother        CareTeam (Including outside providers/suppliers regularly involved in providing care):   Patient Care Team:  Noa Hinson MD as PCP - General (Internal Medicine)  Noa Hinson MD as PCP - REHABILITATION [facility] Empaneled Provider    Wt Readings from Last 3 Encounters:   10/14/21 163 lb 9.6 oz (74.2 kg)   08/11/21 165 lb 12.8 oz (75.2 kg)   03/23/21 173 lb (78.5 kg)     Vitals:    10/14/21 1200   BP: 106/66   Site: Right Upper Arm   Position: Sitting   Cuff Size: Large Adult   Pulse: 91   Resp: 18   Temp: 97.8 °F (36.6 °C)   TempSrc: Tympanic   SpO2: 98%   Weight: 163 lb 9.6 oz (74.2 kg)   Height: 5' 3\" (1.6 m)     Body mass index is 28.98 kg/m². Based upon direct observation of the patient, evaluation of cognition reveals recent and remote memory intact. Patient's complete Health Risk Assessment and screening values have been reviewed and are found in Flowsheets.  The following problems were reviewed today and where indicated follow up appointments were made and/or referrals ordered. Positive Risk Factor Screenings with Interventions:     Fall Risk:  Timed Up and Go Test > 12 seconds? (Complete if either Fall Risk answers are Yes): no  2 or more falls in past year?: (!) yes  Fall with injury in past year?: (!) yes  Fall Risk Interventions:    · Patient declines any further evaluation/treatment for this issue          General Health and ACP:  General  In general, how would you say your health is?: Very Good  In the past 7 days, have you experienced any of the following?  New or Increased Pain, New or Increased Fatigue, Loneliness, Social Isolation, Stress or Anger?: (!) Stress  Do you get the social and emotional support that you need?: Yes  Do you have a Living Will?: Yes  Advance Directives     Power of  Living Will ACP-Advance Directive ACP-Power of     Not on File Not on File Not on File Not on File      General Health Risk Interventions:  · No need for intervention     Hearing/Vision:  No exam data present  Hearing/Vision  Do you or your family notice any trouble with your hearing that hasn't been managed with hearing aids?: No  Do you have difficulty driving, watching TV, or doing any of your daily activities because of your eyesight?: No  Have you had an eye exam within the past year?: (!) No  Hearing/Vision Interventions:  · Hearing concerns:  patient declines any further evaluation/treatment for hearing issues   · Vision concerns:  Patient encouraged to schedule exam with eye specialist      Personalized Preventive Plan   Current Health Maintenance Status  Immunization History   Administered Date(s) Administered    COVID-19, Moderna, PF, 100mcg/0.5mL 02/22/2021, 03/22/2021    Pneumococcal Conjugate 13-valent ([id number]) 01/28/2015, 02/19/2018    Pneumococcal Polysaccharide ([id number]) 03/01/2001, 12/04/2019    Tdap (Boostrix, Adacel) 03/01/2011    Zoster Live (Zostavax) 01/23/2013    Zoster Recombinant (Shingrix) 07/29/2019, 10/22/2019        Health Maintenance   Topic Date Due    Annual Wellness Visit (AWV)  09/10/2021    A1C test (Diabetic or Prediabetic)  08/11/2022    Breast cancer screen  10/22/2022    Lipid screen  03/31/2026    Colon cancer screen colonoscopy  11/08/2028    DTaP/Tdap/Td vaccine (3 - Td or Tdap) 08/16/2031    DEXA (modify frequency per FRAX score)  Completed    Shingles Vaccine  Completed    Pneumococcal 65+ years Vaccine  Completed    COVID-19 Vaccine  Completed    Hepatitis C screen  Completed    Hepatitis A vaccine  Aged Out    Hepatitis B vaccine  Aged Out    Hib vaccine  Aged Out    Meningococcal (ACWY) vaccine  Aged Out     Recommendations for [facility] Due: see orders and patient instructions/AVS.  . Recommended screening schedule for the next 5-10 years is provided to the patient in written form: see Patient Instructions/AVS.     Diagnosis Orders   1. Routine general medical examination at a health care facility           David VIRGEN LPN, 07/10/4831, performed the documented evaluation under the direct supervision of the attending physician. I agree with above assessment and plan.   Ricki Calderon MD

## 2021-10-14 NOTE — PATIENT INSTRUCTIONS
Personalized Preventive Plan for Dk Benz - 10/14/2021  Medicare offers a range of preventive health benefits. Some of the tests and screenings are paid in full while other may be subject to a deductible, co-insurance, and/or copay. Some of these benefits include a comprehensive review of your medical history including lifestyle, illnesses that may run in your family, and various assessments and screenings as appropriate. After reviewing your medical record and screening and assessments performed today your provider may have ordered immunizations, labs, imaging, and/or referrals for you. A list of these orders (if applicable) as well as your Preventive Care list are included within your After Visit Summary for your review. Other Preventive Recommendations:    · A preventive eye exam performed by an eye specialist is recommended every 1-2 years to screen for glaucoma; cataracts, macular degeneration, and other eye disorders. · A preventive dental visit is recommended every 6 months. · Try to get at least 150 minutes of exercise per week or 10,000 steps per day on a pedometer . · Order or download the FREE \"Exercise & Physical Activity: Your Everyday Guide\" from [employer]. Call 5-405.121.2480 or search [employer] online. · You need 4480-4167 mg of calcium and 3861-6926 IU of vitamin D per day. It is possible to meet your calcium requirement with diet alone, but a vitamin D supplement is usually necessary to meet this goal.  · When exposed to the sun, use a sunscreen that protects against both UVA and UVB radiation with an SPF of 30 or greater. Reapply every 2 to 3 hours or after sweating, drying off with a towel, or swimming. · Always wear a seat belt when traveling in a car. Always wear a helmet when riding a bicycle or motorcycle.

## 2021-11-01 DIAGNOSIS — J45.20 MILD INTERMITTENT ASTHMA WITHOUT COMPLICATION: ICD-10-CM

## 2021-11-03 RX ORDER — MONTELUKAST SODIUM 10 MG/1
10 TABLET ORAL NIGHTLY
Qty: 90 TABLET | Refills: 1 | Status: SHIPPED | OUTPATIENT
Start: 2021-11-03 | End: 2022-04-21

## 2021-11-03 NOTE — TELEPHONE ENCOUNTER
LOV 8-11-21  LRF 5-24-21    Health Maintenance   Topic Date Due    A1C test (Diabetic or Prediabetic)  08/11/2022    Annual Wellness Visit (AWV)  10/15/2022    Breast cancer screen  10/22/2022    Lipid screen  03/31/2026    Colon cancer screen colonoscopy  11/08/2028    DTaP/Tdap/Td vaccine (3 - Td or Tdap) 08/16/2031    DEXA (modify frequency per FRAX score)  Completed    Shingles Vaccine  Completed    Pneumococcal 65+ years Vaccine  Completed    COVID-19 Vaccine  Completed    Hepatitis C screen  Completed    Hepatitis A vaccine  Aged Out    Hepatitis B vaccine  Aged Out    Hib vaccine  Aged Out    Meningococcal (ACWY) vaccine  Aged Out             (applicable per patient's age: Cancer Screenings, Depression Screening, Fall Risk Screening, Immunizations)    Hemoglobin A1C (%)   Date Value   08/11/2021 5.5     LDL Cholesterol (mg/dL)   Date Value   08/15/2018 138 (H)     LDL Calculated (mg/dL)   Date Value   03/31/2021 120     AST (U/L)   Date Value   08/11/2021 25     ALT (U/L)   Date Value   08/11/2021 26     BUN (mg/dL)   Date Value   08/11/2021 21      (goal A1C is < 7)   (goal LDL is <100) need 30-50% reduction from baseline     BP Readings from Last 3 Encounters:   10/14/21 106/66   08/11/21 120/82   03/23/21 126/81    (goal /80)      All Future Testing planned in [facility]:  Lab Frequency Next Occurrence   Lipid Panel Once 02/11/2022   Comprehensive Metabolic Panel Once 84/51/6610   CBC Once 02/11/2022   TSH without Reflex Once 02/11/2022   Vitamin D 25 Hydroxy Once 02/11/2022   Vitamin B12 Once 02/11/2022   Lipid Panel Once 02/13/2022   Comprehensive Metabolic Panel Once 03/10/6871   CBC Once 02/13/2022   TSH without Reflex Once 02/13/2022   Vitamin D 25 Hydroxy Once 02/13/2022   Vitamin B12 Once 02/13/2022   CARLITA CHRISTAL DIGITAL SCREEN BILATERAL Once 11/11/2021       Next Visit Date:  Future Appointments   Date Time Provider Petey Moon   3/9/2022  9:00 AM [facility]            Patient Active Problem List:     Homozygous Factor V Leiden mutation ([facility] [address]     Mild intermittent asthma without complication     Delta storage pool disease [facility])     Cervical stenosis of spine     Primary osteoarthritis of both shoulders     Primary osteoarthritis of both knees     Hyperlipidemia     History of meningitis     Gastroesophageal reflux disease     Hiatal hernia     Colon adenoma     Major depressive disorder with single episode, in partial remission (HCC)     Anxiety     Sleep disturbance     History of Lyme disease     Chronic pain syndrome     Chronic intractable headache     Gastroparesis     Neck pain     Impaired fasting blood sugar     Vitamin D deficiency, unspecified

## 2022-01-19 DIAGNOSIS — K44.9 HIATAL HERNIA: ICD-10-CM

## 2022-01-19 DIAGNOSIS — R10.13 EPIGASTRIC PAIN: ICD-10-CM

## 2022-01-19 DIAGNOSIS — K21.9 GASTROESOPHAGEAL REFLUX DISEASE WITHOUT ESOPHAGITIS: ICD-10-CM

## 2022-01-19 NOTE — TELEPHONE ENCOUNTER
----- Message from Madison Bernstein sent at 1/19/2022  1:43 PM EST -----  Subject: Message to Provider    QUESTIONS  Information for Provider? Patient is needing a written script for her   dexlansoprazole (DEXILANT) 60 MG CPDR delayed release capsule faxed to   her. Fax# [phone]  ---------------------------------------------------------------------------  --------------  Manisha ARREOLA  What is the best way for the office to contact you? OK to leave message on   voicemail  Preferred Call Back Phone Number? [phone]  ---------------------------------------------------------------------------  --------------  SCRIPT ANSWERS  Relationship to Patient?  Self

## 2022-01-19 NOTE — TELEPHONE ENCOUNTER
Pended to PRINT the [address] [place] [address] script for pt to be faxed to her     LOV 8/11/2021  LRF 2/15/2021

## 2022-01-20 RX ORDER — DEXLANSOPRAZOLE 60 MG/1
60 CAPSULE, DELAYED RELEASE ORAL DAILY
Qty: 90 CAPSULE | Refills: 3 | Status: SHIPPED | OUTPATIENT
Start: 2022-01-20 | End: 2023-01-20

## 2022-02-06 DIAGNOSIS — G47.9 SLEEP DISTURBANCE: ICD-10-CM

## 2022-02-07 NOTE — TELEPHONE ENCOUNTER
Last visit: 10/14/21  Last Med refill: 08/11/21  Does patient have enough medication for 72 hours: Yes.     Next Visit Date:  Future Appointments   Date Time Provider Petey Moon   3/9/2022  9:00 AM [facility] [address] Maintenance   Topic Date Due    COVID-19 Vaccine (3 - Booster for Moderna series) 08/22/2021    A1C test (Diabetic or Prediabetic)  08/11/2022    Depression Monitoring  10/14/2022    Annual Wellness Visit (AWV)  10/15/2022    Breast cancer screen  10/22/2022    Lipid screen  03/31/2026    Colon cancer screen colonoscopy  11/08/2028    DTaP/Tdap/Td vaccine (3 - Td or Tdap) 08/16/2031    DEXA (modify frequency per FRAX score)  Completed    Shingles Vaccine  Completed    Pneumococcal 65+ years Vaccine  Completed    Hepatitis C screen  Completed    Hepatitis A vaccine  Aged Out    Hepatitis B vaccine  Aged Out    Hib vaccine  Aged Out    Meningococcal (ACWY) vaccine  Aged Out       Hemoglobin A1C (%)   Date Value   08/11/2021 5.5             ( goal A1C is < 7)   No results found for: LABMICR  LDL Cholesterol (mg/dL)   Date Value   08/15/2018 138 (H)     LDL Calculated (mg/dL)   Date Value   03/31/2021 120   12/18/2019 175 (A)       (goal LDL is <100)   AST (U/L)   Date Value   08/11/2021 25     ALT (U/L)   Date Value   08/11/2021 26     BUN (mg/dL)   Date Value   08/11/2021 21     BP Readings from Last 3 Encounters:   10/14/21 106/66   08/11/21 120/82   03/23/21 126/81          (goal 120/80)    All Future Testing planned in [facility]  Lab Frequency Next Occurrence   Lipid Panel Once 02/11/2022   Comprehensive Metabolic Panel Once 44/52/8300   CBC Once 02/11/2022   TSH without Reflex Once 02/11/2022   Vitamin D 25 Hydroxy Once 02/11/2022   Vitamin B12 Once 02/11/2022   Lipid Panel Once 02/13/2022   Comprehensive Metabolic Panel Once 29/42/3359   CBC Once 02/13/2022   TSH without Reflex Once 02/13/2022   Vitamin D 25 Hydroxy Once 02/13/2022 Vitamin B12 Once 02/13/2022   CARLITA CHRISTAL DIGITAL SCREEN BILATERAL Once 12/10/2021               Patient Active Problem List:     Homozygous Factor V Leiden mutation ([address]     Mild intermittent asthma without complication     Delta storage pool disease [facility])     Cervical stenosis of spine     Primary osteoarthritis of both shoulders     Primary osteoarthritis of both knees     Hyperlipidemia     History of meningitis     Gastroesophageal reflux disease     Hiatal hernia     Colon adenoma     Major depressive disorder with single episode, in partial remission (HCC)     Anxiety     Sleep disturbance     History of Lyme disease     Chronic pain syndrome     Chronic intractable headache     Gastroparesis     Neck pain     Impaired fasting blood sugar     Vitamin D deficiency, unspecified

## 2022-02-08 RX ORDER — TRAZODONE HYDROCHLORIDE 100 MG/1
TABLET ORAL
Qty: 180 TABLET | Refills: 1 | Status: SHIPPED | OUTPATIENT
Start: 2022-02-08 | End: 2022-08-04

## 2022-02-11 ENCOUNTER — NURSE TRIAGE (OUTPATIENT)
Dept: OTHER | Facility: CLINIC | Age: 72
End: 2022-02-11

## 2022-02-11 NOTE — TELEPHONE ENCOUNTER
Received call from [address] at [facility] with [employer]. Subjective: Caller states \"On Tuesday, pt was feeding cats then she got lightheaded and weak and then her legs gave out and she fell and hit her head on the floor and hyperflexed both knees. Got up was weak and shaky, called out of work, rested. Feels better but still feels weak. Pt took home Covid test on Wednesday and it was negative\"     Current Symptoms: feels weak, pain in knees    Onset: 3 days ago; sudden    Associated Symptoms: NA    Pain Severity: 4/10; dull, aching; constant    Temperature: denies fever      What has been tried: Tylenol, Celebrex    LMP: NA Pregnant: NA    Recommended disposition: see in office within 3 days    Care advice provided, patient verbalizes understanding; denies any other questions or concerns; instructed to call back for any new or worsening symptoms. Writer provided warm transfer to Cally Rodriguez [facility] at [facility] for appointment scheduling     Attention Provider: Thank you for allowing me to participate in the care of your patient. The patient was connected to triage in response to information provided to the ECC/PSC. Please do not respond through this encounter as the response is not directed to a shared pool.       Reason for Disposition   MILD weakness (i.e., does not interfere with ability to work, go to school, normal activities) and persists > 1 week    Protocols used: WEAKNESS (GENERALIZED) AND FATIGUE-ADULT-[place]

## 2022-03-07 DIAGNOSIS — K31.84 GASTROPARESIS: ICD-10-CM

## 2022-03-08 RX ORDER — METOCLOPRAMIDE 10 MG/1
TABLET ORAL
Qty: 270 TABLET | Refills: 0 | Status: SHIPPED | OUTPATIENT
Start: 2022-03-08 | End: 2022-06-20

## 2022-03-08 NOTE — TELEPHONE ENCOUNTER
Last visit: 10/14/21  Last Med refill: 09/28/21  Does patient have enough medication for 72 hours: Yes    Next Visit Date:  Future Appointments   Date Time Provider Petey Moon   3/9/2022  9:00 AM Yessy Cinnamon, MD [address] Maintenance   Topic Date Due    COVID-19 Vaccine (3 - Booster for Moderna series) 08/22/2021    A1C test (Diabetic or Prediabetic)  08/11/2022    Depression Monitoring  10/14/2022    Annual Wellness Visit (AWV)  10/15/2022    Breast cancer screen  10/22/2022    Lipid screen  03/31/2026    Colorectal Cancer Screen  11/08/2028    DTaP/Tdap/Td vaccine (3 - Td or Tdap) 08/16/2031    DEXA (modify frequency per FRAX score)  Completed    Shingles Vaccine  Completed    Pneumococcal 65+ years Vaccine  Completed    Hepatitis C screen  Completed    Hepatitis A vaccine  Aged Out    Hepatitis B vaccine  Aged Out    Hib vaccine  Aged Out    Meningococcal (ACWY) vaccine  Aged Out       Hemoglobin A1C (%)   Date Value   08/11/2021 5.5             ( goal A1C is < 7)   No results found for: LABMICR  LDL Cholesterol (mg/dL)   Date Value   08/15/2018 138 (H)     LDL Calculated (mg/dL)   Date Value   03/31/2021 120   12/18/2019 175 (A)       (goal LDL is <100)   AST (U/L)   Date Value   08/11/2021 25     ALT (U/L)   Date Value   08/11/2021 26     BUN (mg/dL)   Date Value   08/11/2021 21     BP Readings from Last 3 Encounters:   10/14/21 106/66   08/11/21 120/82   03/23/21 126/81          (goal 120/80)    All Future Testing planned in [facility]  Lab Frequency Next Occurrence   Lipid Panel Once 03/07/2022   Comprehensive Metabolic Panel Once 65/91/4664   CBC Once 03/07/2022   TSH without Reflex Once 03/07/2022   Vitamin D 25 Hydroxy Once 03/07/2022   Vitamin B12 Once 03/07/2022   Lipid Panel Once 03/07/2022   Comprehensive Metabolic Panel Once 45/41/4605   CBC Once 03/07/2022   TSH without Reflex Once 03/07/2022   Vitamin D 25 Hydroxy Once 03/07/2022   Vitamin B12 Once 03/07/2022   [facility] CHRISTAL DIGITAL SCREEN BILATERAL Once 12/10/2021               Patient Active Problem List:     Homozygous Factor V Leiden mutation ([facility] [address]     Mild intermittent asthma without complication     Delta storage pool disease [facility])     Cervical stenosis of spine     Primary osteoarthritis of both shoulders     Primary osteoarthritis of both knees     Hyperlipidemia     History of meningitis     Gastroesophageal reflux disease     Hiatal hernia     Colon adenoma     Major depressive disorder with single episode, in partial remission (HCC)     Anxiety     Sleep disturbance     History of Lyme disease     Chronic pain syndrome     Chronic intractable headache     Gastroparesis     Neck pain     Impaired fasting blood sugar     Vitamin D deficiency, unspecified

## 2022-03-09 ENCOUNTER — OFFICE VISIT (OUTPATIENT)
Dept: FAMILY MEDICINE CLINIC | Age: 72
End: 2022-03-09
Payer: MEDICARE

## 2022-03-09 VITALS
RESPIRATION RATE: 14 BRPM | TEMPERATURE: 97.5 F | BODY MASS INDEX: 29.72 KG/M2 | DIASTOLIC BLOOD PRESSURE: 70 MMHG | WEIGHT: 167.8 LBS | SYSTOLIC BLOOD PRESSURE: 122 MMHG | HEART RATE: 80 BPM

## 2022-03-09 DIAGNOSIS — Z91.81 AT HIGH RISK FOR FALLS: ICD-10-CM

## 2022-03-09 DIAGNOSIS — M17.0 PRIMARY OSTEOARTHRITIS OF BOTH KNEES: ICD-10-CM

## 2022-03-09 DIAGNOSIS — M48.02 CERVICAL STENOSIS OF SPINE: ICD-10-CM

## 2022-03-09 DIAGNOSIS — J45.20 MILD INTERMITTENT ASTHMA WITHOUT COMPLICATION: ICD-10-CM

## 2022-03-09 DIAGNOSIS — G47.9 SLEEP DISTURBANCE: ICD-10-CM

## 2022-03-09 DIAGNOSIS — R53.83 OTHER FATIGUE: ICD-10-CM

## 2022-03-09 DIAGNOSIS — E78.5 HYPERLIPIDEMIA, UNSPECIFIED HYPERLIPIDEMIA TYPE: ICD-10-CM

## 2022-03-09 DIAGNOSIS — R73.01 IMPAIRED FASTING BLOOD SUGAR: ICD-10-CM

## 2022-03-09 DIAGNOSIS — F32.4 MAJOR DEPRESSIVE DISORDER WITH SINGLE EPISODE, IN PARTIAL REMISSION (HCC): Primary | ICD-10-CM

## 2022-03-09 DIAGNOSIS — M19.011 PRIMARY OSTEOARTHRITIS OF BOTH SHOULDERS: ICD-10-CM

## 2022-03-09 DIAGNOSIS — K31.84 GASTROPARESIS: ICD-10-CM

## 2022-03-09 DIAGNOSIS — M54.2 NECK PAIN: ICD-10-CM

## 2022-03-09 DIAGNOSIS — K44.9 HIATAL HERNIA: ICD-10-CM

## 2022-03-09 DIAGNOSIS — M19.012 PRIMARY OSTEOARTHRITIS OF BOTH SHOULDERS: ICD-10-CM

## 2022-03-09 DIAGNOSIS — R11.0 NAUSEA: ICD-10-CM

## 2022-03-09 DIAGNOSIS — G89.29 CHRONIC INTRACTABLE HEADACHE, UNSPECIFIED HEADACHE TYPE: ICD-10-CM

## 2022-03-09 DIAGNOSIS — G89.4 CHRONIC PAIN SYNDROME: ICD-10-CM

## 2022-03-09 DIAGNOSIS — D69.1 DELTA STORAGE POOL DISEASE (HCC): ICD-10-CM

## 2022-03-09 DIAGNOSIS — G43.809 OTHER MIGRAINE WITHOUT STATUS MIGRAINOSUS, NOT INTRACTABLE: ICD-10-CM

## 2022-03-09 DIAGNOSIS — D68.51 HOMOZYGOUS FACTOR V LEIDEN MUTATION (HCC): ICD-10-CM

## 2022-03-09 DIAGNOSIS — Z86.19 HISTORY OF LYME DISEASE: ICD-10-CM

## 2022-03-09 DIAGNOSIS — R51.9 CHRONIC INTRACTABLE HEADACHE, UNSPECIFIED HEADACHE TYPE: ICD-10-CM

## 2022-03-09 DIAGNOSIS — Z86.61 HISTORY OF MENINGITIS: ICD-10-CM

## 2022-03-09 DIAGNOSIS — F41.9 ANXIETY: ICD-10-CM

## 2022-03-09 DIAGNOSIS — K21.9 GASTROESOPHAGEAL REFLUX DISEASE WITHOUT ESOPHAGITIS: ICD-10-CM

## 2022-03-09 DIAGNOSIS — E55.9 VITAMIN D DEFICIENCY, UNSPECIFIED: ICD-10-CM

## 2022-03-09 PROCEDURE — G8427 DOCREV CUR MEDS BY ELIG CLIN: HCPCS | Performed by: PEDIATRICS

## 2022-03-09 PROCEDURE — 4040F PNEUMOC VAC/ADMIN/RCVD: CPT | Performed by: PEDIATRICS

## 2022-03-09 PROCEDURE — G8400 PT W/DXA NO RESULTS DOC: HCPCS | Performed by: PEDIATRICS

## 2022-03-09 PROCEDURE — 1123F ACP DISCUSS/DSCN MKR DOCD: CPT | Performed by: PEDIATRICS

## 2022-03-09 PROCEDURE — 1090F PRES/ABSN URINE INCON ASSESS: CPT | Performed by: PEDIATRICS

## 2022-03-09 PROCEDURE — G8417 CALC BMI ABV UP PARAM F/U: HCPCS | Performed by: PEDIATRICS

## 2022-03-09 PROCEDURE — 3017F COLORECTAL CA SCREEN DOC REV: CPT | Performed by: PEDIATRICS

## 2022-03-09 PROCEDURE — 1036F TOBACCO NON-USER: CPT | Performed by: PEDIATRICS

## 2022-03-09 PROCEDURE — 99215 OFFICE O/P EST HI 40 MIN: CPT | Performed by: PEDIATRICS

## 2022-03-09 PROCEDURE — G8484 FLU IMMUNIZE NO ADMIN: HCPCS | Performed by: PEDIATRICS

## 2022-03-09 ASSESSMENT — ENCOUNTER SYMPTOMS
EYE REDNESS: 0
CHEST TIGHTNESS: 0
COLOR CHANGE: 0
ABDOMINAL PAIN: 0
STRIDOR: 0
DIARRHEA: 0
EYE PAIN: 0
WHEEZING: 0
BLOOD IN STOOL: 0
VOMITING: 0
RHINORRHEA: 0
COUGH: 0
SINUS PRESSURE: 0
SORE THROAT: 0
SHORTNESS OF BREATH: 0
NAUSEA: 0
PHOTOPHOBIA: 0
BACK PAIN: 1
CONSTIPATION: 0

## 2022-03-09 NOTE — PROGRESS NOTES
Kadie Daleutant (:  1950) is a 73 y.o. female,Established patient, here for evaluation of the following chief complaint(s):  Hyperlipidemia and Depression         ASSESSMENT/PLAN:    1. Major depressive disorder with single episode, in partial remission ([facility] [address]  2. At high risk for falls  3. Anxiety  4. Sleep disturbance  5. Other fatigue  6. Mild intermittent asthma without complication  7. Gastroparesis  8. Nausea  9. Gastroesophageal reflux disease without esophagitis  10. Hiatal hernia  11. Chronic intractable headache, unspecified headache type  12. Other migraine without status migrainosus, not intractable  13. History of meningitis  14. History of Lyme disease  15. Neck pain  16. Cervical stenosis of spine  17. Chronic pain syndrome  18. Hyperlipidemia, unspecified hyperlipidemia type  19. Impaired fasting blood sugar  20. Primary osteoarthritis of both shoulders  21. Primary osteoarthritis of both knees  22. Vitamin D deficiency, unspecified  23. Homozygous Factor V Leiden mutation ([facility]  24. Delta storage pool disease [facility])    Continue current medications  Consider counseling  Continue trazodone at 200 mg for sleep  Obtain labs as ordered  Daily exercise and healthy diet encouraged  Continue Singulair and albuterol as needed  Continue Reglan twice daily and Zofran as needed  Follow-up with GI when due  Colonoscopy due in   Follow-up with pain management as scheduled  Continue Aimovig 140 mg monthly  Follow-up with neurology as needed  Continue Zetia  Follow-up with Ortho as scheduled  Continue vitamin D supplementation  Follow-up with hematology if surgery is warranted  Return for reevaluation if symptoms that occurred in February as documented in HPI recur again  Call with concerns      Return in about 6 months (around 2022) for routine follow up and AWV. Subjective     HPI    Patient presents today for routine follow-up of her chronic medical problems.   She does have a history of major depression, anxiety and sleep disturbance for which she takes Pristiq and trazodone. She states that her symptoms have been very well controlled. We did increase her trazodone to 200 mg after her last visit and this is worked well for her. She is not in counseling and does not wish to participate in counseling. She does have a history of mild intermittent asthma that is well controlled with Singulair and albuterol as needed. She does have a history of gastroparesis, nausea, GERD and hiatal hernia. She does continue on Dexilant in the morning and Nexium at night. She was previously treated with erythromycin for gastroparesis but this was too expensive. She was then given Reglan to use as needed when this flares up. She normally takes this twice daily and this controls her symptoms well. This will flareup depending on what she is eating. She does take a lot of Pepto if any of her symptoms flareup. She was concerned about any esophageal motility disorder or eosinophilic esophagitis and she did have an EGD that was normal.  Her next colonoscopy will be due in 2023. She does have a history of chronic headaches as well as a history of meningitis and Lyme disease. She was previously followed by neurology but she has not followed up with him because he wanted her to see an acupuncturist in [place]. He thought her headaches were secondary to her neck pain/cervical stenosis. She does see pain management Dr. Cj Rodriguez regularly and continues to get Botox every 3 months which is helpful. She was also placed on Aimovig and her headaches have almost completely resolved with this. She still does get some mild headaches about 3 days out of the week but overall she is very happy with control of her symptoms.   She does have a history of chronic pain syndrome and she has had occipital injections with pain management in the past.  She was tried on gabapentin but she was unable to tolerate this because of side effects of fatigue and unsteadiness. She also tried medical marijuana but she did not do very well with that either. She states that overall her neck is okay just depends on what she is doing. She does have a history of hyperlipidemia that is treated with Zetia. She is tolerating this well. She does have a history of an impaired fasting blood sugar consistent with prediabetes and is treated with diet and exercise. Unfortunately her diet has been horrible. She has not been exercising because of some issues with her knees. She does have a history of osteoarthritis of both shoulders and both knees which bother her often. She has known bone spurs and arthritis with an unknown bicep injury of the left shoulder. She does continue to use Celebrex and Voltaren gel. She does get injections by Dr. Jay Pérez and Dr. Lilia Noble regularly. She did go to [place] recently and states that she had gone burning 1 day and the next day she could hardly walk because of pain in her knees. Her right hip was replaced in the past.  She does have a history of vitamin D deficiency and continues to take vitamin D supplement regularly. She does have a history of homozygous factor V Leiden mutation and delta storage pool disease which are stable. She does have a hematologist, Dr. Eduardo Roman that she follows with only if she has surgery. She does tell me that she had an incident on 2/8/2022 when she went to feed her cats. She stated that she collapsed on the floor and is not sure exactly what happened. She states that she did not trip over anything and did not pass out but just collapsed. She reports that she over flexed her knees and hit the back of her head. She had to lay there for about 5 minutes and then got up. She felt very shaky and weak.   She ended up going to take care of her animals and came back and took her blood pressure, pulse ox and pulse which were all normal.  She was supposed to go to work that day but she called in because she knew she needed to rest.  She did call here to try and get an appointment but no one ever contacted her back. She did take a home Covid test but this was negative. She is sure that she probably has some kind of bug because she did feel better fairly quickly. Her symptoms have not recurred. Pap is no longer recommended due to age  Nirav Fail and DEXA scan are both up-to-date. She has no other concerns at this time. cmw      Review of Systems   Constitutional: Positive for fatigue. Negative for appetite change, fever and unexpected weight change. HENT: Negative for congestion, ear pain, hearing loss, postnasal drip, rhinorrhea, sinus pressure, sneezing, sore throat and tinnitus. Eyes: Negative for photophobia, pain, redness and visual disturbance. Respiratory: Negative for cough, chest tightness, shortness of breath, wheezing and stridor. Asthma controlled   Cardiovascular: Negative for chest pain, palpitations and leg swelling. Gastrointestinal: Negative for abdominal pain, blood in stool, constipation, diarrhea, nausea and vomiting. Nausea and abdominal pain secondary to hiatal hernia, GERD and gastroparesis is not well-controlled with dexilant, Nexium and reglan intermittently. She does feel like she has some esophageal motility disorder. She is awaiting approval for an upper endoscopy. Endocrine: Negative for polydipsia, polyphagia and polyuria. Genitourinary: Negative for decreased urine volume, dysuria, frequency, hematuria and urgency. Musculoskeletal: Positive for arthralgias (of the shoulders and knees), back pain and neck pain. Negative for myalgias. Skin: Negative for color change, rash and wound. Allergic/Immunologic: Negative for immunocompromised state. Neurological: Positive for headaches (improved with aimovig). Negative for dizziness, tremors, seizures, weakness, light-headedness and numbness. Hematological: Negative for adenopathy.  Does not bruise/bleed easily. Psychiatric/Behavioral: Positive for dysphoric mood (well controlled) and sleep disturbance (well controlled with trazodone). The patient is nervous/anxious (occasionally). Objective      Physical Exam  Vitals and nursing note reviewed. Constitutional:       General: She is not in acute distress. Appearance: Normal appearance. She is well-developed and normal weight. She is not ill-appearing, toxic-appearing or diaphoretic. HENT:      Head: Normocephalic and atraumatic. Right Ear: Tympanic membrane, ear canal and external ear normal.      Left Ear: Tympanic membrane, ear canal and external ear normal.      Nose: Mucosal edema present. No congestion or rhinorrhea. Mouth/Throat:      Lips: Pink. Mouth: Mucous membranes are moist.      Pharynx: Oropharynx is clear. No pharyngeal swelling, oropharyngeal exudate or posterior oropharyngeal erythema. Eyes:      General: No scleral icterus. Right eye: No discharge. Left eye: No discharge. Pupils: Pupils are equal, round, and reactive to light. Neck:      Thyroid: No thyromegaly. Vascular: No JVD. Cardiovascular:      Rate and Rhythm: Normal rate and regular rhythm. Pulses: Normal pulses. Heart sounds: Murmur heard. Pulmonary:      Effort: Pulmonary effort is normal. No respiratory distress. Breath sounds: No transmitted upper airway sounds. No decreased breath sounds, wheezing, rhonchi or rales. Abdominal:      General: Bowel sounds are normal.      Palpations: Abdomen is soft. There is no mass. Tenderness: There is no abdominal tenderness. There is no guarding. Musculoskeletal:      Cervical back: Normal range of motion. Tenderness present. No spasms. Muscular tenderness present. Back:       Right knee: Deformity present. Left knee: Deformity present. Right lower leg: Normal. No edema. Left lower leg: Normal. No edema. Lymphadenopathy:      Cervical: No cervical adenopathy. Skin:     General: Skin is warm and dry. Capillary Refill: Capillary refill takes less than 2 seconds. Findings: No erythema or rash. Neurological:      General: No focal deficit present. Mental Status: She is alert and oriented to person, place, and time. Cranial Nerves: No cranial nerve deficit. Coordination: Coordination normal.      Deep Tendon Reflexes: Reflexes are normal and symmetric. Psychiatric:         Attention and Perception: Attention normal.         Mood and Affect: Mood and affect normal. Mood is not anxious or depressed. Speech: Speech normal.         Behavior: Behavior normal.         Thought Content: Thought content normal.         Cognition and Memory: Cognition normal.            On this date 3/9/2022 I have spent 45 minutes reviewing previous notes, test results and face to face with the patient discussing the diagnosis and importance of compliance with the treatment plan as well as documenting on the day of the visit. An electronic signature was used to authenticate this note. --Dannie Chaparro MD   On the basis of positive falls risk screening, assessment and plan is as follows: home safety tips provided.

## 2022-03-11 DIAGNOSIS — E78.5 HYPERLIPIDEMIA, UNSPECIFIED HYPERLIPIDEMIA TYPE: ICD-10-CM

## 2022-03-11 RX ORDER — EZETIMIBE 10 MG/1
10 TABLET ORAL DAILY
Qty: 90 TABLET | Refills: 1 | Status: SHIPPED | OUTPATIENT
Start: 2022-03-11 | End: 2022-09-14

## 2022-03-11 NOTE — TELEPHONE ENCOUNTER
LOV 3-9-22  LRF 9-28-21    Health Maintenance   Topic Date Due    A1C test (Diabetic or Prediabetic)  08/11/2022    Depression Monitoring  10/14/2022    Annual Wellness Visit (AWV)  10/15/2022    Breast cancer screen  10/22/2022    Colorectal Cancer Screen  11/08/2023    Lipid screen  03/31/2026    DTaP/Tdap/Td vaccine (3 - Td or Tdap) 08/16/2031    DEXA (modify frequency per FRAX score)  Completed    Shingles Vaccine  Completed    Pneumococcal 65+ years Vaccine  Completed    COVID-19 Vaccine  Completed    Hepatitis C screen  Completed    Hepatitis A vaccine  Aged Out    Hepatitis B vaccine  Aged Out    Hib vaccine  Aged Out    Meningococcal (ACWY) vaccine  Aged Out             (applicable per patient's age: Cancer Screenings, Depression Screening, Fall Risk Screening, Immunizations)    Hemoglobin A1C (%)   Date Value   08/11/2021 5.5     LDL Cholesterol (mg/dL)   Date Value   08/15/2018 138 (H)     LDL Calculated (mg/dL)   Date Value   03/31/2021 120     AST (U/L)   Date Value   08/11/2021 25     ALT (U/L)   Date Value   08/11/2021 26     BUN (mg/dL)   Date Value   08/11/2021 21      (goal A1C is < 7)   (goal LDL is <100) need 30-50% reduction from baseline     BP Readings from Last 3 Encounters:   03/09/22 122/70   10/14/21 106/66   08/11/21 120/82    (goal /80)      All Future Testing planned in [facility]:  Lab Frequency Next Occurrence   Lipid Panel Once 03/07/2022   Comprehensive Metabolic Panel Once 77/00/4585   CBC Once 03/07/2022   TSH without Reflex Once 03/07/2022   Vitamin D 25 Hydroxy Once 03/07/2022   Vitamin B12 Once 03/07/2022   Lipid Panel Once 03/07/2022   Comprehensive Metabolic Panel Once 26/41/2547   CBC Once 03/07/2022   TSH without Reflex Once 03/07/2022   Vitamin D 25 Hydroxy Once 03/07/2022   Vitamin B12 Once 03/07/2022   CARLITA CHRISTAL DIGITAL SCREEN BILATERAL Once 12/10/2021       Next Visit Date:  Future Appointments   Date Time Provider Petey Moon   10/17/2022  9:30 MD Greg Bro [facility]   10/17/2022 10:00 AM MD Refugio Sanchez [facility]            Patient Active Problem List:     Homozygous Factor V Leiden mutation [facility])     Mild intermittent asthma without complication     Delta storage pool disease [facility])     Cervical stenosis of spine     Primary osteoarthritis of both shoulders     Primary osteoarthritis of both knees     Hyperlipidemia     History of meningitis     Gastroesophageal reflux disease     Hiatal hernia     Colon adenoma     Major depressive disorder with single episode, in partial remission (HCC)     Anxiety     Sleep disturbance     History of Lyme disease     Chronic pain syndrome     Chronic intractable headache     Gastroparesis     Neck pain     Impaired fasting blood sugar     Vitamin D deficiency, unspecified

## 2022-03-16 DIAGNOSIS — M19.012 PRIMARY OSTEOARTHRITIS OF BOTH SHOULDERS: ICD-10-CM

## 2022-03-16 DIAGNOSIS — M19.011 PRIMARY OSTEOARTHRITIS OF BOTH SHOULDERS: ICD-10-CM

## 2022-03-16 DIAGNOSIS — M17.0 PRIMARY OSTEOARTHRITIS OF BOTH KNEES: ICD-10-CM

## 2022-03-16 RX ORDER — CELECOXIB 200 MG/1
200 CAPSULE ORAL DAILY
Qty: 90 CAPSULE | Refills: 1 | Status: SHIPPED | OUTPATIENT
Start: 2022-03-16 | End: 2023-03-16

## 2022-03-16 NOTE — TELEPHONE ENCOUNTER
Last visit: 03/09/2022  Last Med refill: 09/28/2021  Does patient have enough medication for 72 hours: no.  Patient states pharmacy supposedly sent request 10 days ago    Next Visit Date:10/17/2022  Future Appointments   Date Time Provider Petey Sarai   10/17/2022  9:30 AM MD Greg Neil [facility]   10/17/2022 10:00 AM MD Greg Neil [facility] [address] Maintenance   Topic Date Due    A1C test (Diabetic or Prediabetic)  08/11/2022    Depression Monitoring  10/14/2022    Annual Wellness Visit (AWV)  10/15/2022    Breast cancer screen  10/22/2022    Colorectal Cancer Screen  11/08/2023    Lipid screen  03/31/2026    DTaP/Tdap/Td vaccine (3 - Td or Tdap) 08/16/2031    DEXA (modify frequency per FRAX score)  Completed    Shingles Vaccine  Completed    Pneumococcal 65+ years Vaccine  Completed    COVID-19 Vaccine  Completed    Hepatitis C screen  Completed    Hepatitis A vaccine  Aged Out    Hepatitis B vaccine  Aged Out    Hib vaccine  Aged Out    Meningococcal (ACWY) vaccine  Aged Out       Hemoglobin A1C (%)   Date Value   08/11/2021 5.5             ( goal A1C is < 7)   No results found for: LABMICR  LDL Cholesterol (mg/dL)   Date Value   08/15/2018 138 (H)     LDL Calculated (mg/dL)   Date Value   03/31/2021 120   12/18/2019 175 (A)       (goal LDL is <100)   AST (U/L)   Date Value   08/11/2021 25     ALT (U/L)   Date Value   08/11/2021 26     BUN (mg/dL)   Date Value   08/11/2021 21     BP Readings from Last 3 Encounters:   03/09/22 122/70   10/14/21 106/66   08/11/21 120/82          (goal 120/80)    All Future Testing planned in [facility]  Lab Frequency Next Occurrence   Lipid Panel Once 03/07/2022   Comprehensive Metabolic Panel Once 52/46/3207   CBC Once 03/07/2022   TSH without Reflex Once 03/07/2022   Vitamin D 25 Hydroxy Once 03/07/2022   Vitamin B12 Once 03/07/2022   Lipid Panel Once 03/07/2022   Comprehensive Metabolic Panel Once 03/07/2022   CBC Once 03/07/2022   TSH without Reflex Once 03/07/2022   Vitamin D 25 Hydroxy Once 03/07/2022   Vitamin B12 Once 03/07/2022   CARLITA CHRISTAL DIGITAL SCREEN BILATERAL Once 12/10/2021               Patient Active Problem List:     Homozygous Factor V Leiden mutation ([facility] [address]     Mild intermittent asthma without complication     Delta storage pool disease ([facility] [address]     Cervical stenosis of spine     Primary osteoarthritis of both shoulders     Primary osteoarthritis of both knees     Hyperlipidemia     History of meningitis     Gastroesophageal reflux disease     Hiatal hernia     Colon adenoma     Major depressive disorder with single episode, in partial remission (HCC)     Anxiety     Sleep disturbance     History of Lyme disease     Chronic pain syndrome     Chronic intractable headache     Gastroparesis     Neck pain     Impaired fasting blood sugar     Vitamin D deficiency, unspecified

## 2022-04-06 ENCOUNTER — HOSPITAL ENCOUNTER (OUTPATIENT)
Age: 72
Setting detail: SPECIMEN
Discharge: HOME | End: 2022-04-06

## 2022-04-06 DIAGNOSIS — E78.5 HYPERLIPIDEMIA, UNSPECIFIED HYPERLIPIDEMIA TYPE: ICD-10-CM

## 2022-04-06 DIAGNOSIS — E53.8 B12 DEFICIENCY: ICD-10-CM

## 2022-04-06 DIAGNOSIS — G47.9 SLEEP DISTURBANCE: ICD-10-CM

## 2022-04-06 DIAGNOSIS — R53.83 OTHER FATIGUE: ICD-10-CM

## 2022-04-06 DIAGNOSIS — D68.51 HOMOZYGOUS FACTOR V LEIDEN MUTATION (HCC): ICD-10-CM

## 2022-04-06 DIAGNOSIS — E55.9 VITAMIN D DEFICIENCY, UNSPECIFIED: ICD-10-CM

## 2022-04-06 LAB
ALBUMIN SERPL-MCNC: 4.4 G/DL (ref 3.5–5.2)
ALBUMIN/GLOBULIN RATIO: 1.8 (ref 1–2.5)
ALP BLD-CCNC: 75 U/L (ref 35–104)
ALT SERPL-CCNC: 20 U/L (ref 5–33)
ANION GAP SERPL CALCULATED.3IONS-SCNC: 15 MMOL/L (ref 9–17)
AST SERPL-CCNC: 18 U/L
BILIRUB SERPL-MCNC: 0.28 MG/DL (ref 0.3–1.2)
BUN BLDV-MCNC: 20 MG/DL (ref 8–23)
CALCIUM SERPL-MCNC: 9.4 MG/DL (ref 8.6–10.4)
CHLORIDE BLD-SCNC: 104 MMOL/L (ref 98–107)
CHOLESTEROL/HDL RATIO: 4.5
CHOLESTEROL: 254 MG/DL
CO2: 24 MMOL/L (ref 20–31)
CREAT SERPL-MCNC: 0.7 MG/DL (ref 0.5–0.9)
GFR AFRICAN AMERICAN: >60 ML/MIN
GFR NON-AFRICAN AMERICAN: >60 ML/MIN
GFR SERPL CREATININE-BSD FRML MDRD: ABNORMAL ML/MIN/{1.73_M2}
GLUCOSE BLD-MCNC: 96 MG/DL (ref 70–99)
HCT VFR BLD CALC: 42.5 % (ref 36.3–47.1)
HDLC SERPL-MCNC: 56 MG/DL
HEMOGLOBIN: 13.7 G/DL (ref 11.9–15.1)
LDL CHOLESTEROL: 165 MG/DL (ref 0–130)
MCH RBC QN AUTO: 30.3 PG (ref 25.2–33.5)
MCHC RBC AUTO-ENTMCNC: 32.2 G/DL (ref 28.4–34.8)
MCV RBC AUTO: 94 FL (ref 82.6–102.9)
NRBC AUTOMATED: 0 PER 100 WBC
PDW BLD-RTO: 12.9 % (ref 11.8–14.4)
PLATELET # BLD: 280 K/UL (ref 138–453)
PMV BLD AUTO: 8.6 FL (ref 8.1–13.5)
POTASSIUM SERPL-SCNC: 4.3 MMOL/L (ref 3.7–5.3)
RBC # BLD: 4.52 M/UL (ref 3.95–5.11)
SODIUM BLD-SCNC: 143 MMOL/L (ref 135–144)
TOTAL PROTEIN: 6.8 G/DL (ref 6.4–8.3)
TRIGL SERPL-MCNC: 167 MG/DL
TSH SERPL DL<=0.05 MIU/L-ACNC: 1.07 UIU/ML (ref 0.3–5)
VITAMIN B-12: 698 PG/ML (ref 232–1245)
VITAMIN D 25-HYDROXY: 54.4 NG/ML
WBC # BLD: 8 K/UL (ref 3.5–11.3)

## 2022-04-18 DIAGNOSIS — E78.5 HYPERLIPIDEMIA, UNSPECIFIED HYPERLIPIDEMIA TYPE: Primary | ICD-10-CM

## 2022-04-18 DIAGNOSIS — E53.8 B12 DEFICIENCY: ICD-10-CM

## 2022-04-18 DIAGNOSIS — R53.83 OTHER FATIGUE: ICD-10-CM

## 2022-04-18 DIAGNOSIS — E55.9 VITAMIN D DEFICIENCY, UNSPECIFIED: ICD-10-CM

## 2022-04-20 DIAGNOSIS — J45.20 MILD INTERMITTENT ASTHMA WITHOUT COMPLICATION: ICD-10-CM

## 2022-04-21 RX ORDER — MONTELUKAST SODIUM 10 MG/1
TABLET ORAL
Qty: 90 TABLET | Refills: 1 | Status: SHIPPED | OUTPATIENT
Start: 2022-04-21

## 2022-04-21 NOTE — TELEPHONE ENCOUNTER
Last visit: 03/09/22  Last Med refill: 11/03/21  Does patient have enough medication for 72 hours: Yes    Next Visit Date:  Future Appointments   Date Time Provider Petey Sarai   10/17/2022  9:30 AM MD Greg Castillo  [facility]   10/17/2022 10:00 AM MD Greg Castillo [facility] [address] Maintenance   Topic Date Due    A1C test (Diabetic or Prediabetic)  08/11/2022    Depression Monitoring  10/14/2022    Annual Wellness Visit (AWV)  10/15/2022    Breast cancer screen  10/22/2022    Colorectal Cancer Screen  11/08/2023    Lipids  04/06/2027    DTaP/Tdap/Td vaccine (3 - Td or Tdap) 08/16/2031    DEXA (modify frequency per FRAX score)  Completed    Shingles Vaccine  Completed    Pneumococcal 65+ years Vaccine  Completed    COVID-19 Vaccine  Completed    Hepatitis C screen  Completed    Hepatitis A vaccine  Aged Out    Hepatitis B vaccine  Aged Out    Hib vaccine  Aged Out    Meningococcal (ACWY) vaccine  Aged Out       Hemoglobin A1C (%)   Date Value   08/11/2021 5.5             ( goal A1C is < 7)   No results found for: LABMICR  LDL Cholesterol (mg/dL)   Date Value   04/06/2022 165 (H)   08/15/2018 138 (H)     LDL Calculated (mg/dL)   Date Value   03/31/2021 120   12/18/2019 175 (A)       (goal LDL is <100)   AST (U/L)   Date Value   04/06/2022 18     ALT (U/L)   Date Value   04/06/2022 20     BUN (mg/dL)   Date Value   04/06/2022 20     BP Readings from Last 3 Encounters:   03/09/22 122/70   10/14/21 106/66   08/11/21 120/82          (goal 120/80)    All Future Testing planned in [facility]  Lab Frequency Next Occurrence   CARLITA CHRISTAL DIGITAL SCREEN BILATERAL Once 12/10/2021   Lipid Panel Once 10/14/2022   Comprehensive Metabolic Panel Once 57/47/1169   CBC Once 10/14/2022   TSH Once 10/14/2022   Vitamin D 25 Hydroxy Once 10/14/2022   Vitamin B12 Once 10/14/2022               Patient Active Problem List:     Homozygous Factor V Leiden mutation ([address]     Mild intermittent asthma without complication     Delta storage pool disease [facility])     Cervical stenosis of spine     Primary osteoarthritis of both shoulders     Primary osteoarthritis of both knees     Hyperlipidemia     History of meningitis     Gastroesophageal reflux disease     Hiatal hernia     Colon adenoma     Major depressive disorder with single episode, in partial remission ([facility])     Anxiety     Sleep disturbance     History of Lyme disease     Chronic pain syndrome     Chronic intractable headache     Gastroparesis     Neck pain     Impaired fasting blood sugar     Vitamin D deficiency, unspecified

## 2022-05-26 ENCOUNTER — OFFICE VISIT (OUTPATIENT)
Dept: FAMILY MEDICINE CLINIC | Age: 72
End: 2022-05-26
Payer: MEDICARE

## 2022-05-26 VITALS
DIASTOLIC BLOOD PRESSURE: 80 MMHG | WEIGHT: 167.2 LBS | BODY MASS INDEX: 29.62 KG/M2 | SYSTOLIC BLOOD PRESSURE: 132 MMHG | TEMPERATURE: 98.3 F | OXYGEN SATURATION: 95 % | HEART RATE: 85 BPM | RESPIRATION RATE: 16 BRPM

## 2022-05-26 DIAGNOSIS — N63.11 UNSPECIFIED LUMP IN THE RIGHT BREAST, UPPER OUTER QUADRANT: Primary | ICD-10-CM

## 2022-05-26 PROCEDURE — 3017F COLORECTAL CA SCREEN DOC REV: CPT | Performed by: PEDIATRICS

## 2022-05-26 PROCEDURE — G8427 DOCREV CUR MEDS BY ELIG CLIN: HCPCS | Performed by: PEDIATRICS

## 2022-05-26 PROCEDURE — 1036F TOBACCO NON-USER: CPT | Performed by: PEDIATRICS

## 2022-05-26 PROCEDURE — 1123F ACP DISCUSS/DSCN MKR DOCD: CPT | Performed by: PEDIATRICS

## 2022-05-26 PROCEDURE — 99212 OFFICE O/P EST SF 10 MIN: CPT | Performed by: PEDIATRICS

## 2022-05-26 PROCEDURE — G8400 PT W/DXA NO RESULTS DOC: HCPCS | Performed by: PEDIATRICS

## 2022-05-26 PROCEDURE — G8417 CALC BMI ABV UP PARAM F/U: HCPCS | Performed by: PEDIATRICS

## 2022-05-26 PROCEDURE — 1090F PRES/ABSN URINE INCON ASSESS: CPT | Performed by: PEDIATRICS

## 2022-05-26 RX ORDER — UREA 10 %
1 LOTION (ML) TOPICAL
COMMUNITY

## 2022-05-26 RX ORDER — SENNOSIDES 8.6 MG
1300 CAPSULE ORAL 2 TIMES DAILY
COMMUNITY

## 2022-05-26 ASSESSMENT — ENCOUNTER SYMPTOMS
EYE REDNESS: 0
STRIDOR: 0
COUGH: 0
WHEEZING: 0
EYE PAIN: 0
SHORTNESS OF BREATH: 0

## 2022-05-26 NOTE — PROGRESS NOTES
Emily Astorga (:  1950) is a 73 y.o. female,Established patient, here for evaluation of the following chief complaint(s):  Breast Mass (Onset last night. Right side. No burning pain or discomfort noted. Jeffries pit sized. )         ASSESSMENT/PLAN:    1. Unspecified lump in the right breast, upper outer quadrant   -     [facility] DIAGNOSTIC UNILATERAL RIGHT; Future  -     US BREAST LIMITED RIGHT; Future      Obtain right diagnostic mammogram and right breast ultrasound  Consider surgical consultation after review of above testing  Call with concerns      Return if symptoms worsen or fail to improve. Subjective     HPI    Patient presents today for evaluation of right breast lump that she noticed last night while in bed. Patient states that she thought she felt a tick crawling on her and placed her hand on her right breast and noticed that there was a small cherry pit size mass. This is quite firm but nontender. It is not very mobile. She has had no other associated abnormalities including skin changes or dimpling of the breast.  She did have a normal screening mammogram in 2021. She does have a family history of breast cancer in a maternal aunt. She has never taken hormone therapy. cmw      Review of Systems   Constitutional: Negative for appetite change and fatigue. Eyes: Negative for pain and redness. Respiratory: Negative for cough, shortness of breath, wheezing and stridor. Cardiovascular: Negative for chest pain, palpitations and leg swelling. Genitourinary:        Lump in right breast x 1 day   Allergic/Immunologic: Negative for immunocompromised state. Hematological: Negative for adenopathy. Does not bruise/bleed easily. Objective      Physical Exam  Vitals and nursing note reviewed. Constitutional:       General: She is not in acute distress. Appearance: Normal appearance. She is not ill-appearing, toxic-appearing or diaphoretic.    Chest:

## 2022-06-09 DIAGNOSIS — N63.11 UNSPECIFIED LUMP IN THE RIGHT BREAST, UPPER OUTER QUADRANT: ICD-10-CM

## 2022-06-10 ENCOUNTER — TELEPHONE (OUTPATIENT)
Dept: FAMILY MEDICINE CLINIC | Age: 72
End: 2022-06-10

## 2022-06-10 DIAGNOSIS — N63.11 UNSPECIFIED LUMP IN THE RIGHT BREAST, UPPER OUTER QUADRANT: ICD-10-CM

## 2022-06-10 DIAGNOSIS — R92.8 ABNORMAL MAMMOGRAM OF RIGHT BREAST: Primary | ICD-10-CM

## 2022-06-10 NOTE — TELEPHONE ENCOUNTER
All of our orders have LOC except this one I found. Please call to see if this order will work. Do they have a specific image code they are looking for?

## 2022-06-10 NOTE — TELEPHONE ENCOUNTER
Geoffrey Conway from [facility] contacted the office requesting an order for a fine needle biopsy due to the patient having an abnormal right breast ultrasound. Per [facility] do not use LOC.   Fax number [phone]

## 2022-06-17 DIAGNOSIS — F32.4 MAJOR DEPRESSIVE DISORDER WITH SINGLE EPISODE, IN PARTIAL REMISSION (HCC): ICD-10-CM

## 2022-06-17 DIAGNOSIS — K31.84 GASTROPARESIS: ICD-10-CM

## 2022-06-20 RX ORDER — DESVENLAFAXINE 50 MG/1
50 TABLET, EXTENDED RELEASE ORAL DAILY
Qty: 90 TABLET | Refills: 1 | Status: SHIPPED | OUTPATIENT
Start: 2022-06-20

## 2022-06-20 RX ORDER — METOCLOPRAMIDE 10 MG/1
10 TABLET ORAL
Qty: 270 TABLET | Refills: 1 | Status: SHIPPED | OUTPATIENT
Start: 2022-06-20 | End: 2023-06-20

## 2022-06-20 NOTE — TELEPHONE ENCOUNTER
LOV 3/9/22  LRF Reglan 3/8/22,  Pristiq 8/11/21  RTO 6 months,     Health Maintenance   Topic Date Due    A1C test (Diabetic or Prediabetic)  08/11/2022    Depression Monitoring  10/14/2022    Annual Wellness Visit (AWV)  10/15/2022    Colorectal Cancer Screen  11/08/2023    Breast cancer screen  06/08/2024    Lipids  04/06/2027    DTaP/Tdap/Td vaccine (3 - Td or Tdap) 08/16/2031    DEXA (modify frequency per FRAX score)  Completed    Shingles vaccine  Completed    Pneumococcal 65+ years Vaccine  Completed    COVID-19 Vaccine  Completed    Hepatitis C screen  Completed    Hepatitis A vaccine  Aged Out    Hepatitis B vaccine  Aged Out    Hib vaccine  Aged Out    Meningococcal (ACWY) vaccine  Aged Out             (applicable per patient's age: Cancer Screenings, Depression Screening, Fall Risk Screening, Immunizations)    Hemoglobin A1C (%)   Date Value   08/11/2021 5.5     LDL Cholesterol (mg/dL)   Date Value   04/06/2022 165 (H)     LDL Calculated (mg/dL)   Date Value   03/31/2021 120     AST (U/L)   Date Value   04/06/2022 18     ALT (U/L)   Date Value   04/06/2022 20     BUN (mg/dL)   Date Value   04/06/2022 20      (goal A1C is < 7)   (goal LDL is <100) need 30-50% reduction from baseline     BP Readings from Last 3 Encounters:   05/26/22 132/80   03/09/22 122/70   10/14/21 106/66    (goal /80)      All Future Testing planned in [facility]:  Lab Frequency Next Occurrence   CARLITA CHRISTAL DIGITAL SCREEN BILATERAL Once 12/10/2021   Lipid Panel Once 10/14/2022   Comprehensive Metabolic Panel Once 55/31/7107   CBC Once 10/14/2022   TSH Once 10/14/2022   Vitamin D 25 Hydroxy Once 10/14/2022   Vitamin B12 Once 10/14/2022   CARLITA CHRISTAL DIGITAL DIAGNOSTIC UNILATERAL RIGHT Once 05/26/2022   NE SONO GUIDE NEEDLE BIOPSY Once 06/11/2022       Next Visit Date:  Future Appointments   Date Time Provider Petey Moon   9/12/2022 11:00 AM MD Greg Zuleta [facility]   10/17/2022  9:30 AM MD Greg Cazares   10/17/2022 10:00 AM Terence Davey MD Elex Masker [facility] Hendrix            Patient Active Problem List:     Homozygous Factor V Leiden mutation [facility])     Mild intermittent asthma without complication     Delta storage pool disease [facility])     Cervical stenosis of spine     Primary osteoarthritis of both shoulders     Primary osteoarthritis of both knees     Hyperlipidemia     History of meningitis     Gastroesophageal reflux disease     Hiatal hernia     Colon adenoma     Major depressive disorder with single episode, in partial remission (HCC)     Anxiety     Sleep disturbance     History of Lyme disease     Chronic pain syndrome     Chronic intractable headache     Gastroparesis     Neck pain     Impaired fasting blood sugar     Vitamin D deficiency, unspecified

## 2022-06-21 DIAGNOSIS — N63.11 UNSPECIFIED LUMP IN THE RIGHT BREAST, UPPER OUTER QUADRANT: ICD-10-CM

## 2022-06-24 DIAGNOSIS — R92.8 ABNORMAL MAMMOGRAM OF RIGHT BREAST: ICD-10-CM

## 2022-06-24 DIAGNOSIS — N63.11 UNSPECIFIED LUMP IN THE RIGHT BREAST, UPPER OUTER QUADRANT: ICD-10-CM

## 2022-06-28 NOTE — RESULT ENCOUNTER NOTE
Please call. Right breast biopsy indicates benign findings. Pathology indicates benign old hematoma and fibrosis (consistent with breast surgery)  Recommended bilateral breast follow up in 6 months.

## 2022-08-03 DIAGNOSIS — G47.9 SLEEP DISTURBANCE: ICD-10-CM

## 2022-08-04 RX ORDER — TRAZODONE HYDROCHLORIDE 100 MG/1
200 TABLET ORAL NIGHTLY
Qty: 180 TABLET | Refills: 1 | Status: SHIPPED | OUTPATIENT
Start: 2022-08-04 | End: 2023-08-04

## 2022-08-04 NOTE — TELEPHONE ENCOUNTER
LOV 3-9-22  LRF 2-8-22    Health Maintenance   Topic Date Due    COVID-19 Vaccine (4 - Booster for Moderna series) 02/26/2022    A1C test (Diabetic or Prediabetic)  08/11/2022    Depression Monitoring  10/14/2022    Annual Wellness Visit (AWV)  10/15/2022    Colorectal Cancer Screen  11/08/2023    Breast cancer screen  06/20/2024    Lipids  04/06/2027    DTaP/Tdap/Td vaccine (3 - Td or Tdap) 08/16/2031    DEXA (modify frequency per FRAX score)  Completed    Shingles vaccine  Completed    Pneumococcal 65+ years Vaccine  Completed    Hepatitis C screen  Completed    Hepatitis A vaccine  Aged Out    Hepatitis B vaccine  Aged Out    Hib vaccine  Aged Out    Meningococcal (ACWY) vaccine  Aged Out             (applicable per patient's age: Cancer Screenings, Depression Screening, Fall Risk Screening, Immunizations)    Hemoglobin A1C (%)   Date Value   08/11/2021 5.5     LDL Cholesterol (mg/dL)   Date Value   04/06/2022 165 (H)     LDL Calculated (mg/dL)   Date Value   03/31/2021 120     AST (U/L)   Date Value   04/06/2022 18     ALT (U/L)   Date Value   04/06/2022 20     BUN (mg/dL)   Date Value   04/06/2022 20      (goal A1C is < 7)   (goal LDL is <100) need 30-50% reduction from baseline     BP Readings from Last 3 Encounters:   05/26/22 132/80   03/09/22 122/70   10/14/21 106/66    (goal /80)      All Future Testing planned in [facility]:  Lab Frequency Next Occurrence   CARLITA CHRISTAL DIGITAL SCREEN BILATERAL Once 12/10/2021   Lipid Panel Once 10/14/2022   Comprehensive Metabolic Panel Once 12/92/1333   CBC Once 10/14/2022   TSH Once 10/14/2022   Vitamin D 25 Hydroxy Once 10/14/2022   Vitamin B12 Once 10/14/2022       Next Visit Date:  Future Appointments   Date Time Provider Petey Moon   9/12/2022 11:00 AM MD Greg Diez [facility]   10/17/2022  9:30 AM MD Greg Diez [facility]   10/17/2022 10:00 AM Loyce [place], MD [facility] Patient Active Problem List:     Homozygous Factor V Leiden mutation ([facility] [address]     Mild intermittent asthma without complication     Delta storage pool disease [facility])     Cervical stenosis of spine     Primary osteoarthritis of both shoulders     Primary osteoarthritis of both knees     Hyperlipidemia     History of meningitis     Gastroesophageal reflux disease     Hiatal hernia     Colon adenoma     Major depressive disorder with single episode, in partial remission ([facility])     Anxiety     Sleep disturbance     History of Lyme disease     Chronic pain syndrome     Chronic intractable headache     Gastroparesis     Neck pain     Impaired fasting blood sugar     Vitamin D deficiency, unspecified

## 2022-08-29 DIAGNOSIS — J45.21 MILD INTERMITTENT ASTHMA WITH ACUTE EXACERBATION: ICD-10-CM

## 2022-08-30 RX ORDER — ALBUTEROL SULFATE 90 UG/1
2 AEROSOL, METERED RESPIRATORY (INHALATION) EVERY 4 HOURS PRN
Qty: 1 EACH | Refills: 2 | Status: SHIPPED | OUTPATIENT
Start: 2022-08-30 | End: 2023-08-30

## 2022-08-30 NOTE — TELEPHONE ENCOUNTER
Last visit: 3/9/22  Last Med refill: 6/22/21  Does patient have enough medication for 72 hours: Yes    Next Visit Date:  Future Appointments   Date Time Provider Petey Sarai   9/12/2022 11:00 AM MD Greg Marinelli [facility]   10/17/2022  9:30 AM MD Greg Marinelli [facility]   10/17/2022 10:00 AM MD Karlie Marinelli [facility] [address] Maintenance   Topic Date Due    COVID-19 Vaccine (4 - Booster for Moderna series) 02/26/2022    A1C test (Diabetic or Prediabetic)  08/11/2022    Depression Monitoring  10/14/2022    Annual Wellness Visit (AWV)  10/15/2022    Colorectal Cancer Screen  11/08/2023    Breast cancer screen  06/20/2024    Lipids  04/06/2027    DTaP/Tdap/Td vaccine (3 - Td or Tdap) 08/16/2031    DEXA (modify frequency per FRAX score)  Completed    Shingles vaccine  Completed    Pneumococcal 65+ years Vaccine  Completed    Hepatitis C screen  Completed    Hepatitis A vaccine  Aged Out    Hepatitis B vaccine  Aged Out    Hib vaccine  Aged Out    Meningococcal (ACWY) vaccine  Aged Out       Hemoglobin A1C (%)   Date Value   08/11/2021 5.5             ( goal A1C is < 7)   No results found for: LABMICR  LDL Cholesterol (mg/dL)   Date Value   04/06/2022 165 (H)   08/15/2018 138 (H)     LDL Calculated (mg/dL)   Date Value   03/31/2021 120   12/18/2019 175 (A)       (goal LDL is <100)   AST (U/L)   Date Value   04/06/2022 18     ALT (U/L)   Date Value   04/06/2022 20     BUN (mg/dL)   Date Value   04/06/2022 20     BP Readings from Last 3 Encounters:   05/26/22 132/80   03/09/22 122/70   10/14/21 106/66          (goal 120/80)    All Future Testing planned in [facility]  Lab Frequency Next Occurrence   CARLITA CHRISTAL DIGITAL SCREEN BILATERAL Once 12/10/2021   Lipid Panel Once 10/14/2022   Comprehensive Metabolic Panel Once 63/10/7571   CBC Once 10/14/2022   TSH Once 10/14/2022   Vitamin D 25 Hydroxy Once 10/14/2022   Vitamin B12 Once 10/14/2022 Patient Active Problem List:     Homozygous Factor V Leiden mutation ([facility] [address]     Mild intermittent asthma without complication     Delta storage pool disease [facility])     Cervical stenosis of spine     Primary osteoarthritis of both shoulders     Primary osteoarthritis of both knees     Hyperlipidemia     History of meningitis     Gastroesophageal reflux disease     Hiatal hernia     Colon adenoma     Major depressive disorder with single episode, in partial remission ([facility])     Anxiety     Sleep disturbance     History of Lyme disease     Chronic pain syndrome     Chronic intractable headache     Gastroparesis     Neck pain     Impaired fasting blood sugar     Vitamin D deficiency, unspecified

## 2022-09-12 ENCOUNTER — OFFICE VISIT (OUTPATIENT)
Dept: FAMILY MEDICINE CLINIC | Age: 72
End: 2022-09-12
Payer: MEDICARE

## 2022-09-12 VITALS
SYSTOLIC BLOOD PRESSURE: 116 MMHG | TEMPERATURE: 96.3 F | HEART RATE: 96 BPM | RESPIRATION RATE: 15 BRPM | WEIGHT: 160.6 LBS | DIASTOLIC BLOOD PRESSURE: 78 MMHG | BODY MASS INDEX: 28.46 KG/M2 | HEIGHT: 63 IN

## 2022-09-12 DIAGNOSIS — M17.11 PRIMARY OSTEOARTHRITIS OF RIGHT KNEE: ICD-10-CM

## 2022-09-12 DIAGNOSIS — F32.4 MAJOR DEPRESSIVE DISORDER WITH SINGLE EPISODE, IN PARTIAL REMISSION (HCC): ICD-10-CM

## 2022-09-12 DIAGNOSIS — F41.9 ANXIETY: ICD-10-CM

## 2022-09-12 DIAGNOSIS — D68.51 HOMOZYGOUS FACTOR V LEIDEN MUTATION (HCC): ICD-10-CM

## 2022-09-12 DIAGNOSIS — M17.12 PRIMARY OSTEOARTHRITIS OF LEFT KNEE: ICD-10-CM

## 2022-09-12 DIAGNOSIS — E55.9 VITAMIN D DEFICIENCY, UNSPECIFIED: ICD-10-CM

## 2022-09-12 DIAGNOSIS — E78.5 HYPERLIPIDEMIA, UNSPECIFIED HYPERLIPIDEMIA TYPE: ICD-10-CM

## 2022-09-12 DIAGNOSIS — Z86.19 HISTORY OF LYME DISEASE: ICD-10-CM

## 2022-09-12 DIAGNOSIS — G47.9 SLEEP DISTURBANCE: ICD-10-CM

## 2022-09-12 DIAGNOSIS — Z01.818 PREOP EXAMINATION: Primary | ICD-10-CM

## 2022-09-12 DIAGNOSIS — Z86.61 HISTORY OF MENINGITIS: ICD-10-CM

## 2022-09-12 DIAGNOSIS — R01.1 HEART MURMUR: ICD-10-CM

## 2022-09-12 DIAGNOSIS — G89.4 CHRONIC PAIN SYNDROME: ICD-10-CM

## 2022-09-12 DIAGNOSIS — G43.809 OTHER MIGRAINE WITHOUT STATUS MIGRAINOSUS, NOT INTRACTABLE: Primary | ICD-10-CM

## 2022-09-12 DIAGNOSIS — K44.9 HIATAL HERNIA: ICD-10-CM

## 2022-09-12 DIAGNOSIS — D69.1 DELTA STORAGE POOL DISEASE (HCC): ICD-10-CM

## 2022-09-12 DIAGNOSIS — G89.29 CHRONIC INTRACTABLE HEADACHE, UNSPECIFIED HEADACHE TYPE: ICD-10-CM

## 2022-09-12 DIAGNOSIS — R73.01 IMPAIRED FASTING BLOOD SUGAR: ICD-10-CM

## 2022-09-12 DIAGNOSIS — R11.0 NAUSEA: ICD-10-CM

## 2022-09-12 DIAGNOSIS — K31.84 GASTROPARESIS: ICD-10-CM

## 2022-09-12 DIAGNOSIS — R51.9 CHRONIC INTRACTABLE HEADACHE, UNSPECIFIED HEADACHE TYPE: ICD-10-CM

## 2022-09-12 DIAGNOSIS — J30.2 SEASONAL ALLERGIES: ICD-10-CM

## 2022-09-12 DIAGNOSIS — J45.20 MILD INTERMITTENT ASTHMA WITHOUT COMPLICATION: ICD-10-CM

## 2022-09-12 DIAGNOSIS — G43.809 OTHER MIGRAINE WITHOUT STATUS MIGRAINOSUS, NOT INTRACTABLE: ICD-10-CM

## 2022-09-12 DIAGNOSIS — K21.9 GASTROESOPHAGEAL REFLUX DISEASE WITHOUT ESOPHAGITIS: ICD-10-CM

## 2022-09-12 DIAGNOSIS — M48.02 CERVICAL STENOSIS OF SPINE: ICD-10-CM

## 2022-09-12 DIAGNOSIS — M54.2 NECK PAIN: ICD-10-CM

## 2022-09-12 PROCEDURE — 1090F PRES/ABSN URINE INCON ASSESS: CPT | Performed by: PEDIATRICS

## 2022-09-12 PROCEDURE — G8417 CALC BMI ABV UP PARAM F/U: HCPCS | Performed by: PEDIATRICS

## 2022-09-12 PROCEDURE — G8427 DOCREV CUR MEDS BY ELIG CLIN: HCPCS | Performed by: PEDIATRICS

## 2022-09-12 PROCEDURE — 99213 OFFICE O/P EST LOW 20 MIN: CPT | Performed by: PEDIATRICS

## 2022-09-12 RX ORDER — ONDANSETRON 4 MG/1
4 TABLET, FILM COATED ORAL 4 TIMES DAILY PRN
Qty: 270 TABLET | Refills: 0 | Status: SHIPPED | OUTPATIENT
Start: 2022-09-12 | End: 2023-09-12

## 2022-09-12 RX ORDER — BUDESONIDE AND FORMOTEROL FUMARATE DIHYDRATE 160; 4.5 UG/1; UG/1
2 AEROSOL RESPIRATORY (INHALATION) 2 TIMES DAILY
Qty: 30.6 G | Refills: 1 | Status: SHIPPED | OUTPATIENT
Start: 2022-09-12

## 2022-09-12 SDOH — ECONOMIC STABILITY: FOOD INSECURITY: WITHIN THE PAST 12 MONTHS, YOU WORRIED THAT YOUR FOOD WOULD RUN OUT BEFORE YOU GOT MONEY TO BUY MORE.: NEVER TRUE

## 2022-09-12 SDOH — ECONOMIC STABILITY: FOOD INSECURITY: WITHIN THE PAST 12 MONTHS, THE FOOD YOU BOUGHT JUST DIDN'T LAST AND YOU DIDN'T HAVE MONEY TO GET MORE.: NEVER TRUE

## 2022-09-12 ASSESSMENT — PATIENT HEALTH QUESTIONNAIRE - PHQ9
1. LITTLE INTEREST OR PLEASURE IN DOING THINGS: 0
6. FEELING BAD ABOUT YOURSELF - OR THAT YOU ARE A FAILURE OR HAVE LET YOURSELF OR YOUR FAMILY DOWN: 0
10. IF YOU CHECKED OFF ANY PROBLEMS, HOW DIFFICULT HAVE THESE PROBLEMS MADE IT FOR YOU TO DO YOUR WORK, TAKE CARE OF THINGS AT HOME, OR GET ALONG WITH OTHER PEOPLE: 0
9. THOUGHTS THAT YOU WOULD BE BETTER OFF DEAD, OR OF HURTING YOURSELF: 0
SUM OF ALL RESPONSES TO PHQ QUESTIONS 1-9: 2
7. TROUBLE CONCENTRATING ON THINGS, SUCH AS READING THE NEWSPAPER OR WATCHING TELEVISION: 0
4. FEELING TIRED OR HAVING LITTLE ENERGY: 0
8. MOVING OR SPEAKING SO SLOWLY THAT OTHER PEOPLE COULD HAVE NOTICED. OR THE OPPOSITE, BEING SO FIGETY OR RESTLESS THAT YOU HAVE BEEN MOVING AROUND A LOT MORE THAN USUAL: 0
SUM OF ALL RESPONSES TO PHQ QUESTIONS 1-9: 2
SUM OF ALL RESPONSES TO PHQ9 QUESTIONS 1 & 2: 1
SUM OF ALL RESPONSES TO PHQ QUESTIONS 1-9: 2
SUM OF ALL RESPONSES TO PHQ QUESTIONS 1-9: 2
5. POOR APPETITE OR OVEREATING: 0
3. TROUBLE FALLING OR STAYING ASLEEP: 1
2. FEELING DOWN, DEPRESSED OR HOPELESS: 1

## 2022-09-12 ASSESSMENT — ENCOUNTER SYMPTOMS
CONSTIPATION: 0
CHEST TIGHTNESS: 0
ABDOMINAL PAIN: 0
EYE REDNESS: 0
BACK PAIN: 1
COUGH: 1
EYE PAIN: 0
PHOTOPHOBIA: 0
SHORTNESS OF BREATH: 0
SORE THROAT: 0
DIARRHEA: 0
NAUSEA: 0
VOMITING: 0
WHEEZING: 0
SINUS PRESSURE: 0
RHINORRHEA: 0
STRIDOR: 0
COLOR CHANGE: 0
BLOOD IN STOOL: 0

## 2022-09-12 ASSESSMENT — SOCIAL DETERMINANTS OF HEALTH (SDOH): HOW HARD IS IT FOR YOU TO PAY FOR THE VERY BASICS LIKE FOOD, HOUSING, MEDICAL CARE, AND HEATING?: NOT HARD AT ALL

## 2022-09-12 NOTE — PROGRESS NOTES
Trena Mills (:  1950) is a 73 y.o. female,Established patient, here for evaluation of the following chief complaint(s):    Pre-op Exam         ASSESSMENT/PLAN:    1. Preop examination  2. Primary osteoarthritis of right knee  3. Primary osteoarthritis of left knee  4. Major depressive disorder with single episode, in partial remission ([facility] [address]  5. Anxiety  6. Sleep disturbance  7. Mild intermittent asthma without complication  -     budesonide-formoterol (SYMBICORT) 160-4.5 MCG/ACT AERO; Inhale 2 puffs into the lungs 2 times daily, Disp-30.6 g, R-1Normal  8. Seasonal allergies  9. Gastroparesis  10. Nausea  -     ondansetron (ZOFRAN) 4 MG tablet; Take 1 tablet by mouth 4 times daily as needed for Nausea or Vomiting, Disp-270 tablet, R-0Normal  11. Gastroesophageal reflux disease without esophagitis  12. Hiatal hernia  13. Chronic intractable headache, unspecified headache type  14. Other migraine without status migrainosus, not intractable  15. History of meningitis  16. History of Lyme disease  17. Chronic pain syndrome  18. Neck pain  19. Cervical stenosis of spine  20. Heart murmur  21. Hyperlipidemia, unspecified hyperlipidemia type  22. Impaired fasting blood sugar  23. Vitamin D deficiency, unspecified  24. Homozygous Factor V Leiden mutation ([facility]  25. Delta storage pool disease [facility])        Proceed with preoperative testing as scheduled on 2022  Clearance to be determined after review of preoperative testing  Continue current medications  Start Symbicort as prescribed  Continue albuterol  Add over-the-counter Nasonex or Flonase for improved allergy control  Refill Zofran to use as needed for nausea  Do not take any NSAID 7 days prior to surgery  May take a.m. meds with a sip of water on the morning of surgery  Call with concerns      Return for routine follow up.          Subjective     HPI    Patient presents today for routine preoperative clearance regarding right total knee arthroplasty that been taking all of her medications as prescribed and denies any side effects. She did see her hematologist, Dr. Vincent Gomez for clearance. She does have preoperative clearance testing scheduled for tomorrow at [facility]. Once her preoperative clearance testing can be reviewed I will determine clearance at that time. cmw        Review of Systems   Constitutional:  Positive for fatigue. Negative for appetite change, fever and unexpected weight change. HENT:  Negative for congestion, ear pain, hearing loss, postnasal drip, rhinorrhea, sinus pressure, sneezing, sore throat and tinnitus. Eyes:  Negative for photophobia, pain, redness and visual disturbance. Respiratory:  Positive for cough (from asthma). Negative for chest tightness, shortness of breath, wheezing and stridor. Asthma controlled   Cardiovascular:  Negative for chest pain, palpitations and leg swelling. Gastrointestinal:  Negative for abdominal pain, blood in stool, constipation, diarrhea, nausea and vomiting. Nausea and abdominal pain secondary to hiatal hernia, GERD and gastroparesis is not well-controlled with dexilant, Nexium and reglan intermittently. She does feel like she has some esophageal motility disorder. She is awaiting approval for an upper endoscopy. Endocrine: Negative for polydipsia, polyphagia and polyuria. Genitourinary:  Negative for decreased urine volume, dysuria, frequency, hematuria and urgency. Musculoskeletal:  Positive for arthralgias (of the shoulders and both knees), back pain (chronic) and neck pain (chronic). Negative for myalgias. Skin:  Negative for color change, rash and wound. Allergic/Immunologic: Negative for immunocompromised state. Neurological:  Positive for headaches (improved with aimovig). Negative for dizziness, tremors, seizures, weakness, light-headedness and numbness. Hematological:  Negative for adenopathy. Does not bruise/bleed easily.    Psychiatric/Behavioral:  Positive for dysphoric mood (well controlled) and sleep disturbance (well controlled with trazodone). The patient is nervous/anxious (occasionally). Objective     Physical Exam  Vitals and nursing note reviewed. Constitutional:       General: She is not in acute distress. Appearance: Normal appearance. She is well-developed and normal weight. She is not ill-appearing, toxic-appearing or diaphoretic. HENT:      Head: Normocephalic and atraumatic. Right Ear: Tympanic membrane, ear canal and external ear normal.      Left Ear: Tympanic membrane, ear canal and external ear normal.      Nose: Mucosal edema present. No congestion or rhinorrhea. Mouth/Throat:      Lips: Pink. Mouth: Mucous membranes are moist.      Pharynx: Oropharynx is clear. No pharyngeal swelling, oropharyngeal exudate or posterior oropharyngeal erythema. Eyes:      General: No scleral icterus. Right eye: No discharge. Left eye: No discharge. Pupils: Pupils are equal, round, and reactive to light. Neck:      Thyroid: No thyromegaly. Vascular: No JVD. Cardiovascular:      Rate and Rhythm: Normal rate and regular rhythm. Pulses: Normal pulses. Heart sounds: Murmur heard. Pulmonary:      Effort: Pulmonary effort is normal. No respiratory distress. Breath sounds: No transmitted upper airway sounds. No decreased breath sounds, wheezing, rhonchi or rales. Abdominal:      General: Bowel sounds are normal.      Palpations: Abdomen is soft. There is no mass. Tenderness: There is no abdominal tenderness. There is no guarding. Musculoskeletal:      Cervical back: Normal range of motion. Tenderness present. No spasms. Muscular tenderness present. Back:       Right knee: Deformity present. Left knee: Deformity present. Right lower leg: Normal. No edema. Left lower leg: Normal. No edema. Lymphadenopathy:      Cervical: No cervical adenopathy.    Skin:     General: Skin is warm and dry. Capillary Refill: Capillary refill takes less than 2 seconds. Findings: No erythema or rash. Neurological:      General: No focal deficit present. Mental Status: She is alert and oriented to person, place, and time. Cranial Nerves: No cranial nerve deficit. Coordination: Coordination normal.      Deep Tendon Reflexes: Reflexes are normal and symmetric. Psychiatric:         Attention and Perception: Attention normal.         Mood and Affect: Mood and affect normal. Mood is not anxious or depressed. Speech: Speech normal.         Behavior: Behavior normal.         Thought Content: Thought content normal.         Cognition and Memory: Cognition normal.                An electronic signature was used to authenticate this note.     --Raheel Hardin MD

## 2022-09-12 NOTE — TELEPHONE ENCOUNTER
Two sample pens of Aimovig 70 mg given to patient per Dr. Marshal Avalos approval.  Edin Cease: [id number]  Exp: 05/2023  Clinical, please pend and send to Dr. Marshal Avalos for sig.

## 2022-09-13 RX ORDER — ERENUMAB-AOOE 70 MG/ML
70 INJECTION SUBCUTANEOUS WEEKLY
Qty: 2 ADJUSTABLE DOSE PRE-FILLED PEN SYRINGE | Refills: 0 | COMMUNITY
Start: 2022-09-13

## 2022-09-14 DIAGNOSIS — E78.5 HYPERLIPIDEMIA, UNSPECIFIED HYPERLIPIDEMIA TYPE: ICD-10-CM

## 2022-09-14 NOTE — TELEPHONE ENCOUNTER
LOV 3-9-22  [facility] 3-11-22    Health Maintenance   Topic Date Due    COVID-19 Vaccine (4 - Booster for Moderna series) 02/26/2022    A1C test (Diabetic or Prediabetic)  08/11/2022    Annual Wellness Visit (AWV)  10/15/2022    Depression Monitoring  09/12/2023    Colorectal Cancer Screen  11/08/2023    Breast cancer screen  06/20/2024    Lipids  04/06/2027    DTaP/Tdap/Td vaccine (3 - Td or Tdap) 08/16/2031    DEXA (modify frequency per FRAX score)  Completed    Shingles vaccine  Completed    Pneumococcal 65+ years Vaccine  Completed    Hepatitis C screen  Completed    Hepatitis A vaccine  Aged Out    Hepatitis B vaccine  Aged Out    Hib vaccine  Aged Out    Meningococcal (ACWY) vaccine  Aged Out             (applicable per patient's age: Cancer Screenings, Depression Screening, Fall Risk Screening, Immunizations)    Hemoglobin A1C (%)   Date Value   08/11/2021 5.5     LDL Cholesterol (mg/dL)   Date Value   04/06/2022 165 (H)     LDL Calculated (mg/dL)   Date Value   03/31/2021 120     AST (U/L)   Date Value   04/06/2022 18     ALT (U/L)   Date Value   04/06/2022 20     BUN (mg/dL)   Date Value   04/06/2022 20      (goal A1C is < 7)   (goal LDL is <100) need 30-50% reduction from baseline     BP Readings from Last 3 Encounters:   09/12/22 116/78   05/26/22 132/80   03/09/22 122/70    (goal /80)      All Future Testing planned in [facility]:  Lab Frequency Next Occurrence   CARLITA CHRISTAL DIGITAL SCREEN BILATERAL Once 12/10/2021   Lipid Panel Once 10/14/2022   Comprehensive Metabolic Panel Once 97/60/0949   CBC Once 10/14/2022   TSH Once 10/14/2022   Vitamin D 25 Hydroxy Once 10/14/2022   Vitamin B12 Once 10/14/2022       Next Visit Date:  Future Appointments   Date Time Provider Petey Moon   10/17/2022  9:30 AM MD Greg Arenas [facility]   10/17/2022 10:00 AM MD Santana Arenas [facility]            Patient Active Problem List:     Homozygous Factor V Leiden mutation ([facility] [address]     Mild intermittent asthma without complication     Delta storage pool disease [facility])     Cervical stenosis of spine     Primary osteoarthritis of both shoulders     Primary osteoarthritis of both knees     Hyperlipidemia     History of meningitis     Gastroesophageal reflux disease     Hiatal hernia     Colon adenoma     Major depressive disorder with single episode, in partial remission (HCC)     Anxiety     Sleep disturbance     History of Lyme disease     Chronic pain syndrome     Chronic intractable headache     Gastroparesis     Neck pain     Impaired fasting blood sugar     Vitamin D deficiency, unspecified

## 2022-09-15 RX ORDER — EZETIMIBE 10 MG/1
10 TABLET ORAL DAILY
Qty: 90 TABLET | Refills: 1 | Status: SHIPPED | OUTPATIENT
Start: 2022-09-15 | End: 2023-09-15

## 2022-10-19 ENCOUNTER — TELEPHONE (OUTPATIENT)
Dept: FAMILY MEDICINE CLINIC | Age: 72
End: 2022-10-19

## 2022-10-19 NOTE — TELEPHONE ENCOUNTER
Patient called stating that she just had knee surgery and wont be able to come in sooner.  She wants to know if she can  samples of Aimovig 140mg on Nov 1st?    Is this something we can do for the patient     Amavig 140mg

## 2022-11-02 DIAGNOSIS — R51.9 CHRONIC INTRACTABLE HEADACHE, UNSPECIFIED HEADACHE TYPE: ICD-10-CM

## 2022-11-02 DIAGNOSIS — G89.29 CHRONIC INTRACTABLE HEADACHE, UNSPECIFIED HEADACHE TYPE: ICD-10-CM

## 2022-11-02 NOTE — TELEPHONE ENCOUNTER
Last visit: 09/12/2022  Last Med refill: 02/10/2021  Does patient have enough medication for 72 hours: no  Sample if approved Lot: [id number] Exp: 05/2023    Next Visit Date:01/19/2023  Future Appointments   Date Time Provider Petey Moon   1/19/2023 11:00 AM MD Greg L[facility]pez [facility]   1/19/2023 11:30 AM Nix Medicine, MD [address] Maintenance   Topic Date Due    COVID-19 Vaccine (4 - Booster for Moderna series) 12/21/2021    A1C test (Diabetic or Prediabetic)  08/11/2022    Annual Wellness Visit (AWV)  10/15/2022    Depression Monitoring  09/12/2023    Colorectal Cancer Screen  11/08/2023    Breast cancer screen  06/20/2024    Lipids  04/06/2027    DTaP/Tdap/Td vaccine (3 - Td or Tdap) 08/16/2031    DEXA (modify frequency per FRAX score)  Completed    Shingles vaccine  Completed    Pneumococcal 65+ years Vaccine  Completed    Hepatitis C screen  Completed    Hepatitis A vaccine  Aged Out    Hib vaccine  Aged Out    Meningococcal (ACWY) vaccine  Aged Out       Hemoglobin A1C (%)   Date Value   08/11/2021 5.5             ( goal A1C is < 7)   No results found for: LABMICR  LDL Cholesterol (mg/dL)   Date Value   04/06/2022 165 (H)   08/15/2018 138 (H)     LDL Calculated (mg/dL)   Date Value   03/31/2021 120   12/18/2019 175 (A)       (goal LDL is <100)   AST (U/L)   Date Value   04/06/2022 18     ALT (U/L)   Date Value   04/06/2022 20     BUN (mg/dL)   Date Value   04/06/2022 20     BP Readings from Last 3 Encounters:   09/12/22 116/78   05/26/22 132/80   03/09/22 122/70          (goal 120/80)    All Future Testing planned in [facility]  Lab Frequency Next Occurrence   CARLITA CHRISTAL DIGITAL SCREEN BILATERAL Once 12/10/2021   Lipid Panel Once 10/14/2022   Comprehensive Metabolic Panel Once 42/87/8391   CBC Once 10/14/2022   TSH Once 10/14/2022   Vitamin D 25 Hydroxy Once 10/14/2022   Vitamin B12 Once 10/14/2022               Patient Active Problem List:     Homozygous Factor V Leiden mutation ([facility] [address]     Mild intermittent asthma without complication     Delta storage pool disease [facility])     Cervical stenosis of spine     Primary osteoarthritis of both shoulders     Primary osteoarthritis of both knees     Hyperlipidemia     History of meningitis     Gastroesophageal reflux disease     Hiatal hernia     Colon adenoma     Major depressive disorder with single episode, in partial remission ([facility])     Anxiety     Sleep disturbance     History of Lyme disease     Chronic pain syndrome     Chronic intractable headache     Gastroparesis     Neck pain     Impaired fasting blood sugar     Vitamin D deficiency, unspecified

## 2022-11-07 ENCOUNTER — E-VISIT (OUTPATIENT)
Dept: FAMILY MEDICINE CLINIC | Age: 72
End: 2022-11-07
Payer: MEDICARE

## 2022-11-07 DIAGNOSIS — U07.1 COVID-19: Primary | ICD-10-CM

## 2022-11-07 PROCEDURE — 99421 OL DIG E/M SVC 5-10 MIN: CPT | Performed by: PEDIATRICS

## 2022-11-07 RX ORDER — PREDNISONE 20 MG/1
40 TABLET ORAL DAILY
Qty: 20 TABLET | Refills: 0 | Status: SHIPPED | OUTPATIENT
Start: 2022-11-07 | End: 2022-11-17

## 2022-11-07 RX ORDER — BENZONATATE 100 MG/1
200 CAPSULE ORAL 3 TIMES DAILY PRN
Qty: 60 CAPSULE | Refills: 0 | Status: SHIPPED | OUTPATIENT
Start: 2022-11-07 | End: 2022-11-17

## 2022-11-07 RX ORDER — AZITHROMYCIN 250 MG/1
TABLET, FILM COATED ORAL
Qty: 6 TABLET | Refills: 0 | Status: SHIPPED | OUTPATIENT
Start: 2022-11-07 | End: 2022-11-17

## 2022-11-07 ASSESSMENT — LIFESTYLE VARIABLES: SMOKING_STATUS: NO, I HAVE NEVER SMOKED

## 2022-11-14 DIAGNOSIS — M19.012 PRIMARY OSTEOARTHRITIS OF BOTH SHOULDERS: ICD-10-CM

## 2022-11-14 DIAGNOSIS — M19.011 PRIMARY OSTEOARTHRITIS OF BOTH SHOULDERS: ICD-10-CM

## 2022-11-14 DIAGNOSIS — J45.20 MILD INTERMITTENT ASTHMA WITHOUT COMPLICATION: ICD-10-CM

## 2022-11-14 DIAGNOSIS — M17.0 PRIMARY OSTEOARTHRITIS OF BOTH KNEES: ICD-10-CM

## 2022-11-14 RX ORDER — CELECOXIB 200 MG/1
200 CAPSULE ORAL DAILY
Qty: 90 CAPSULE | Refills: 0 | Status: SHIPPED | OUTPATIENT
Start: 2022-11-14 | End: 2023-11-14

## 2022-11-14 RX ORDER — MONTELUKAST SODIUM 10 MG/1
10 TABLET ORAL NIGHTLY
Qty: 90 TABLET | Refills: 0 | Status: SHIPPED | OUTPATIENT
Start: 2022-11-14 | End: 2023-11-14

## 2022-11-14 NOTE — TELEPHONE ENCOUNTER
LOV 3-9-22  LRF Singulair 4-21-22, Celebrex 3-16-22    Health Maintenance   Topic Date Due    COVID-19 Vaccine (4 - Booster for Moderna series) 12/21/2021    A1C test (Diabetic or Prediabetic)  08/11/2022    Annual Wellness Visit (AWV)  10/15/2022    Depression Monitoring  09/12/2023    Colorectal Cancer Screen  11/08/2023    Breast cancer screen  06/20/2024    Lipids  04/06/2027    DTaP/Tdap/Td vaccine (3 - Td or Tdap) 08/16/2031    DEXA (modify frequency per FRAX score)  Completed    Shingles vaccine  Completed    Pneumococcal 65+ years Vaccine  Completed    Hepatitis C screen  Completed    Hepatitis A vaccine  Aged Out    Hib vaccine  Aged Out    Meningococcal (ACWY) vaccine  Aged Out             (applicable per patient's age: Cancer Screenings, Depression Screening, Fall Risk Screening, Immunizations)    Hemoglobin A1C (%)   Date Value   08/11/2021 5.5     LDL Cholesterol (mg/dL)   Date Value   04/06/2022 165 (H)     LDL Calculated (mg/dL)   Date Value   03/31/2021 120     AST (U/L)   Date Value   04/06/2022 18     ALT (U/L)   Date Value   04/06/2022 20     BUN (mg/dL)   Date Value   04/06/2022 20      (goal A1C is < 7)   (goal LDL is <100) need 30-50% reduction from baseline     BP Readings from Last 3 Encounters:   09/12/22 116/78   05/26/22 132/80   03/09/22 122/70    (goal /80)      All Future Testing planned in [facility]:  Lab Frequency Next Occurrence   CARLITA CHRISTAL DIGITAL SCREEN BILATERAL Once 12/10/2021   Lipid Panel Once 10/14/2022   Comprehensive Metabolic Panel Once 97/58/5035   CBC Once 10/14/2022   TSH Once 10/14/2022   Vitamin D 25 Hydroxy Once 10/14/2022   Vitamin B12 Once 10/14/2022       Next Visit Date:  Future Appointments   Date Time Provider Petey Moon   1/19/2023 11:00 AM [facility]   1/19/2023 11:30 AM MD Pantera Ricks [facility]            Patient Active Problem List:     Homozygous Factor V Leiden mutation ([address]     Mild intermittent asthma without complication     Delta storage pool disease [facility])     Cervical stenosis of spine     Primary osteoarthritis of both shoulders     Primary osteoarthritis of both knees     Hyperlipidemia     History of meningitis     Gastroesophageal reflux disease     Hiatal hernia     Colon adenoma     Major depressive disorder with single episode, in partial remission (HCC)     Anxiety     Sleep disturbance     History of Lyme disease     Chronic pain syndrome     Chronic intractable headache     Gastroparesis     Neck pain     Impaired fasting blood sugar     Vitamin D deficiency, unspecified

## 2022-12-05 DIAGNOSIS — F32.4 MAJOR DEPRESSIVE DISORDER WITH SINGLE EPISODE, IN PARTIAL REMISSION (HCC): ICD-10-CM

## 2022-12-05 NOTE — TELEPHONE ENCOUNTER
LOV 3-9-22  LRF 6-20-22    Health Maintenance   Topic Date Due    COVID-19 Vaccine (4 - Booster for Moderna series) 12/21/2021    A1C test (Diabetic or Prediabetic)  08/11/2022    Annual Wellness Visit (AWV)  10/15/2022    Depression Monitoring  09/12/2023    Colorectal Cancer Screen  11/08/2023    Breast cancer screen  06/20/2024    Lipids  04/06/2027    DTaP/Tdap/Td vaccine (3 - Td or Tdap) 08/16/2031    DEXA (modify frequency per FRAX score)  Completed    Shingles vaccine  Completed    Pneumococcal 65+ years Vaccine  Completed    Hepatitis C screen  Completed    Hepatitis A vaccine  Aged Out    Hib vaccine  Aged Out    Meningococcal (ACWY) vaccine  Aged Out             (applicable per patient's age: Cancer Screenings, Depression Screening, Fall Risk Screening, Immunizations)    Hemoglobin A1C (%)   Date Value   08/11/2021 5.5     LDL Cholesterol (mg/dL)   Date Value   04/06/2022 165 (H)     LDL Calculated (mg/dL)   Date Value   03/31/2021 120     AST (U/L)   Date Value   04/06/2022 18     ALT (U/L)   Date Value   04/06/2022 20     BUN (mg/dL)   Date Value   04/06/2022 20      (goal A1C is < 7)   (goal LDL is <100) need 30-50% reduction from baseline     BP Readings from Last 3 Encounters:   09/12/22 116/78   05/26/22 132/80   03/09/22 122/70    (goal /80)      All Future Testing planned in [facility]:  Lab Frequency Next Occurrence   CARLITA CHRISTAL DIGITAL SCREEN BILATERAL Once 12/10/2021   Lipid Panel Once 10/14/2022   Comprehensive Metabolic Panel Once 64/17/7695   CBC Once 10/14/2022   TSH Once 10/14/2022   Vitamin D 25 Hydroxy Once 10/14/2022   Vitamin B12 Once 10/14/2022       Next Visit Date:  Future Appointments   Date Time Provider Petey Moon   1/19/2023 11:00 AM MD Greg [facility] Sony[facility] Ornelas   1/19/2023 11:30 AM MD Rosemary Rodriguez [facility]            Patient Active Problem List:     Homozygous Factor V Leiden mutation ([facility]     Mild intermittent asthma without complication     Delta storage pool disease [facility])     Cervical stenosis of spine     Primary osteoarthritis of both shoulders     Primary osteoarthritis of both knees     Hyperlipidemia     History of meningitis     Gastroesophageal reflux disease     Hiatal hernia     Colon adenoma     Major depressive disorder with single episode, in partial remission ([facility])     Anxiety     Sleep disturbance     History of Lyme disease     Chronic pain syndrome     Chronic intractable headache     Gastroparesis     Neck pain     Impaired fasting blood sugar     Vitamin D deficiency, unspecified

## 2022-12-06 RX ORDER — DESVENLAFAXINE 50 MG/1
50 TABLET, EXTENDED RELEASE ORAL DAILY
Qty: 90 TABLET | Refills: 1 | Status: SHIPPED | OUTPATIENT
Start: 2022-12-06 | End: 2023-12-06

## 2022-12-12 ENCOUNTER — TELEPHONE (OUTPATIENT)
Dept: FAMILY MEDICINE CLINIC | Age: 72
End: 2022-12-12

## 2022-12-12 DIAGNOSIS — Z12.31 BREAST CANCER SCREENING BY MAMMOGRAM: Primary | ICD-10-CM

## 2022-12-12 DIAGNOSIS — R92.8 ABNORMAL MAMMOGRAM OF RIGHT BREAST: ICD-10-CM

## 2022-12-12 NOTE — TELEPHONE ENCOUNTER
Screening mammogram signed. Please fax. Please let patient know when this is done and close encounter when complete.

## 2022-12-13 NOTE — TELEPHONE ENCOUNTER
Patient is aware  Patient expressed understanding and denies any other concerns at this time.   Order faxed

## 2022-12-19 NOTE — TELEPHONE ENCOUNTER
Patient needs the Mammogram order to be diagnostic of the right. New order pending. Patient states that  she had a bx in June and this would be the follow up from that.

## 2023-02-02 DIAGNOSIS — G47.9 SLEEP DISTURBANCE: ICD-10-CM

## 2023-02-02 DIAGNOSIS — R11.0 NAUSEA: ICD-10-CM

## 2023-02-03 RX ORDER — ONDANSETRON 4 MG/1
4 TABLET, FILM COATED ORAL 4 TIMES DAILY PRN
Qty: 270 TABLET | Refills: 0 | Status: SHIPPED | OUTPATIENT
Start: 2023-02-03 | End: 2024-02-03

## 2023-02-03 RX ORDER — TRAZODONE HYDROCHLORIDE 100 MG/1
100 TABLET ORAL NIGHTLY
Qty: 180 TABLET | Refills: 1 | Status: SHIPPED | OUTPATIENT
Start: 2023-02-03 | End: 2024-02-03

## 2023-02-03 NOTE — TELEPHONE ENCOUNTER
Last visit: 11-7-22  Last Med refill: 9-12-22  Does patient have enough medication for 72 hours: No:     Next Visit Date:  Future Appointments   Date Time Provider Petey Moon   2/8/2023 11:00 AM Dessa Litten, MD [address] Maintenance   Topic Date Due    COVID-19 Vaccine (4 - Booster for Moderna series) 12/21/2021    A1C test (Diabetic or Prediabetic)  08/11/2022    Annual Wellness Visit (AWV)  10/15/2022    Depression Monitoring  09/12/2023    Colorectal Cancer Screen  11/08/2023    Breast cancer screen  06/20/2024    Lipids  04/06/2027    DTaP/Tdap/Td vaccine (3 - Td or Tdap) 08/16/2031    DEXA (modify frequency per FRAX score)  Completed    Shingles vaccine  Completed    Pneumococcal 65+ years Vaccine  Completed    Hepatitis C screen  Completed    Hepatitis A vaccine  Aged Out    Hib vaccine  Aged Out    Meningococcal (ACWY) vaccine  Aged Out       Hemoglobin A1C (%)   Date Value   08/11/2021 5.5             ( goal A1C is < 7)   No results found for: LABMICR  LDL Cholesterol (mg/dL)   Date Value   04/06/2022 165 (H)   08/15/2018 138 (H)     LDL Calculated (mg/dL)   Date Value   03/31/2021 120   12/18/2019 175 (A)       (goal LDL is <100)   AST (U/L)   Date Value   04/06/2022 18     ALT (U/L)   Date Value   04/06/2022 20     BUN (mg/dL)   Date Value   04/06/2022 20     BP Readings from Last 3 Encounters:   09/12/22 116/78   05/26/22 132/80   03/09/22 122/70          (goal 120/80)    All Future Testing planned in [facility]  Lab Frequency Next Occurrence   Lipid Panel Once 10/14/2022   Comprehensive Metabolic Panel Once 06/26/1966   CBC Once 10/14/2022   TSH Once 10/14/2022   Vitamin D 25 Hydroxy Once 10/14/2022   Vitamin B12 Once 10/14/2022   CARLITA CHRISTAL DIGITAL SCREEN BILATERAL Once 12/12/2022   CARLITA DIGITAL DIAGNOSTIC W OR WO CAD RIGHT Once 12/20/2022               Patient Active Problem List:     Homozygous Factor V Leiden mutation ([address]     Mild intermittent asthma without complication     Delta storage pool disease [facility])     Cervical stenosis of spine     Primary osteoarthritis of both shoulders     Primary osteoarthritis of both knees     Hyperlipidemia     History of meningitis     Gastroesophageal reflux disease     Hiatal hernia     Colon adenoma     Major depressive disorder with single episode, in partial remission ([facility])     Anxiety     Sleep disturbance     History of Lyme disease     Chronic pain syndrome     Chronic intractable headache     Gastroparesis     Neck pain     Impaired fasting blood sugar     Vitamin D deficiency, unspecified

## 2023-02-08 ENCOUNTER — OFFICE VISIT (OUTPATIENT)
Dept: FAMILY MEDICINE CLINIC | Age: 73
End: 2023-02-08

## 2023-02-08 VITALS
TEMPERATURE: 98 F | SYSTOLIC BLOOD PRESSURE: 112 MMHG | RESPIRATION RATE: 12 BRPM | HEART RATE: 83 BPM | BODY MASS INDEX: 29.2 KG/M2 | DIASTOLIC BLOOD PRESSURE: 76 MMHG | HEIGHT: 63 IN | WEIGHT: 164.8 LBS

## 2023-02-08 DIAGNOSIS — Z86.61 HISTORY OF MENINGITIS: ICD-10-CM

## 2023-02-08 DIAGNOSIS — R11.0 NAUSEA: ICD-10-CM

## 2023-02-08 DIAGNOSIS — M17.0 PRIMARY OSTEOARTHRITIS OF BOTH KNEES: ICD-10-CM

## 2023-02-08 DIAGNOSIS — K31.84 GASTROPARESIS: ICD-10-CM

## 2023-02-08 DIAGNOSIS — Z96.653 S/P TOTAL KNEE REPLACEMENT, BILATERAL: ICD-10-CM

## 2023-02-08 DIAGNOSIS — K44.9 HIATAL HERNIA: ICD-10-CM

## 2023-02-08 DIAGNOSIS — K21.9 GASTROESOPHAGEAL REFLUX DISEASE WITHOUT ESOPHAGITIS: ICD-10-CM

## 2023-02-08 DIAGNOSIS — E55.9 VITAMIN D DEFICIENCY, UNSPECIFIED: ICD-10-CM

## 2023-02-08 DIAGNOSIS — G47.9 SLEEP DISTURBANCE: ICD-10-CM

## 2023-02-08 DIAGNOSIS — M19.011 PRIMARY OSTEOARTHRITIS OF BOTH SHOULDERS: ICD-10-CM

## 2023-02-08 DIAGNOSIS — R53.83 OTHER FATIGUE: ICD-10-CM

## 2023-02-08 DIAGNOSIS — F41.9 ANXIETY: ICD-10-CM

## 2023-02-08 DIAGNOSIS — D69.1 DELTA STORAGE POOL DISEASE (HCC): ICD-10-CM

## 2023-02-08 DIAGNOSIS — R73.01 IMPAIRED FASTING BLOOD SUGAR: ICD-10-CM

## 2023-02-08 DIAGNOSIS — M15.9 GENERALIZED OSTEOARTHRITIS OF MULTIPLE SITES: ICD-10-CM

## 2023-02-08 DIAGNOSIS — R51.9 CHRONIC INTRACTABLE HEADACHE, UNSPECIFIED HEADACHE TYPE: ICD-10-CM

## 2023-02-08 DIAGNOSIS — R01.1 HEART MURMUR: ICD-10-CM

## 2023-02-08 DIAGNOSIS — Z00.00 MEDICARE ANNUAL WELLNESS VISIT, SUBSEQUENT: Primary | ICD-10-CM

## 2023-02-08 DIAGNOSIS — M19.012 PRIMARY OSTEOARTHRITIS OF BOTH SHOULDERS: ICD-10-CM

## 2023-02-08 DIAGNOSIS — E78.5 HYPERLIPIDEMIA, UNSPECIFIED HYPERLIPIDEMIA TYPE: ICD-10-CM

## 2023-02-08 DIAGNOSIS — G89.29 CHRONIC INTRACTABLE HEADACHE, UNSPECIFIED HEADACHE TYPE: ICD-10-CM

## 2023-02-08 DIAGNOSIS — D68.51 HOMOZYGOUS FACTOR V LEIDEN MUTATION (HCC): ICD-10-CM

## 2023-02-08 DIAGNOSIS — J30.2 SEASONAL ALLERGIES: ICD-10-CM

## 2023-02-08 DIAGNOSIS — M48.02 CERVICAL STENOSIS OF SPINE: ICD-10-CM

## 2023-02-08 DIAGNOSIS — Z01.00 ROUTINE EYE EXAM: ICD-10-CM

## 2023-02-08 DIAGNOSIS — G43.809 OTHER MIGRAINE WITHOUT STATUS MIGRAINOSUS, NOT INTRACTABLE: ICD-10-CM

## 2023-02-08 DIAGNOSIS — M54.2 NECK PAIN: ICD-10-CM

## 2023-02-08 DIAGNOSIS — D64.9 POSTOPERATIVE ANEMIA: ICD-10-CM

## 2023-02-08 DIAGNOSIS — Z86.19 HISTORY OF LYME DISEASE: ICD-10-CM

## 2023-02-08 DIAGNOSIS — G89.4 CHRONIC PAIN SYNDROME: ICD-10-CM

## 2023-02-08 DIAGNOSIS — J45.20 MILD INTERMITTENT ASTHMA WITHOUT COMPLICATION: ICD-10-CM

## 2023-02-08 DIAGNOSIS — F32.4 MAJOR DEPRESSIVE DISORDER WITH SINGLE EPISODE, IN PARTIAL REMISSION (HCC): ICD-10-CM

## 2023-02-08 RX ORDER — DEXLANSOPRAZOLE 60 MG/1
60 CAPSULE, DELAYED RELEASE ORAL DAILY
Qty: 90 CAPSULE | Refills: 3 | Status: SHIPPED | OUTPATIENT
Start: 2023-02-08 | End: 2024-02-08

## 2023-02-08 ASSESSMENT — ENCOUNTER SYMPTOMS
ABDOMINAL PAIN: 0
BACK PAIN: 1
EYE PAIN: 0
STRIDOR: 0
COUGH: 0
CHEST TIGHTNESS: 0
VOMITING: 0
PHOTOPHOBIA: 0
SINUS PRESSURE: 0
EYE REDNESS: 0
RHINORRHEA: 0
DIARRHEA: 0
NAUSEA: 0
BLOOD IN STOOL: 0
COLOR CHANGE: 0
SORE THROAT: 0
SHORTNESS OF BREATH: 0
CONSTIPATION: 0
WHEEZING: 0

## 2023-02-08 ASSESSMENT — PATIENT HEALTH QUESTIONNAIRE - PHQ9
10. IF YOU CHECKED OFF ANY PROBLEMS, HOW DIFFICULT HAVE THESE PROBLEMS MADE IT FOR YOU TO DO YOUR WORK, TAKE CARE OF THINGS AT HOME, OR GET ALONG WITH OTHER PEOPLE: 0
6. FEELING BAD ABOUT YOURSELF - OR THAT YOU ARE A FAILURE OR HAVE LET YOURSELF OR YOUR FAMILY DOWN: 0
SUM OF ALL RESPONSES TO PHQ QUESTIONS 1-9: 0
9. THOUGHTS THAT YOU WOULD BE BETTER OFF DEAD, OR OF HURTING YOURSELF: 0
SUM OF ALL RESPONSES TO PHQ QUESTIONS 1-9: 0
5. POOR APPETITE OR OVEREATING: 0
SUM OF ALL RESPONSES TO PHQ QUESTIONS 1-9: 0
SUM OF ALL RESPONSES TO PHQ QUESTIONS 1-9: 0
1. LITTLE INTEREST OR PLEASURE IN DOING THINGS: 0
2. FEELING DOWN, DEPRESSED OR HOPELESS: 0
SUM OF ALL RESPONSES TO PHQ9 QUESTIONS 1 & 2: 0
4. FEELING TIRED OR HAVING LITTLE ENERGY: 0
8. MOVING OR SPEAKING SO SLOWLY THAT OTHER PEOPLE COULD HAVE NOTICED. OR THE OPPOSITE, BEING SO FIGETY OR RESTLESS THAT YOU HAVE BEEN MOVING AROUND A LOT MORE THAN USUAL: 0
7. TROUBLE CONCENTRATING ON THINGS, SUCH AS READING THE NEWSPAPER OR WATCHING TELEVISION: 0
3. TROUBLE FALLING OR STAYING ASLEEP: 0

## 2023-02-08 ASSESSMENT — LIFESTYLE VARIABLES: HOW OFTEN DO YOU HAVE A DRINK CONTAINING ALCOHOL: NEVER

## 2023-02-08 NOTE — PROGRESS NOTES
Medicare Annual Wellness Visit    Estefanía Ortiz is here for Medicare AWV, Cholesterol Problem, and COPD    Assessment & Plan     Medicare annual wellness visit, subsequent        Recommendations for Preventive Services Due: see orders and patient instructions/AVS.  Recommended screening schedule for the next 5-10 years is provided to the patient in written form: see Patient Instructions/AVS.     Return for Medicare Annual Wellness Visit in 1 year. Subjective       Patient's complete Health Risk Assessment and screening values have been reviewed and are found in Flowsheets. The following problems were reviewed today and where indicated follow up appointments were made and/or referrals ordered. Positive Risk Factor Screenings with Interventions:                    Vision Screen:  Do you have difficulty driving, watching TV, or doing any of your daily activities because of your eyesight?: (!) Yes  Have you had an eye exam within the past year?: (!) No  No results found. Interventions:   Patient encouraged to make appointment with their eye specialist.  She was given a number for a new ophthalmologist.                        Objective   Vitals:    02/08/23 1111   BP: 112/76   Site: Right Upper Arm   Position: Sitting   Cuff Size: Medium Adult   Pulse: 83   Resp: 12   Temp: 98 °F (36.7 °C)   TempSrc: Temporal   Weight: 164 lb 12.8 oz (74.8 kg)   Height: 5' 3\" (1.6 m)      Body mass index is 29.19 kg/m². Allergies   Allergen Reactions    Latex     Adhesive Tape Itching     Swelling and rash    Ambien [Zolpidem Tartrate]      Feels crazy    Statins Other (See Comments)     Muscle pain    Ciprofloxacin Nausea And Vomiting    Influenza Vaccines Nausea And Vomiting    Other Nausea And Vomiting     All Narcotic pain medications     Prozac [Fluoxetine Hcl] Nausea And Vomiting     Prior to Visit Medications    Medication Sig Taking?  Authorizing Provider   dexlansoprazole (DEXILANT) 60 MG CPDR delayed release capsule Take 1 capsule by mouth daily Yes Jad Caldwell MD   traZODone (DESYREL) 100 MG tablet Take 1 tablet by mouth nightly Yes Jad Caldwell MD   ondansetron (ZOFRAN) 4 MG tablet Take 1 tablet by mouth 4 times daily as needed for Nausea or Vomiting Yes Jad Caldwell MD   desvenlafaxine succinate (PRISTIQ) 50 MG TB24 extended release tablet Take 1 tablet by mouth daily Yes Jad Caldwell MD   montelukast (SINGULAIR) 10 MG tablet Take 1 tablet by mouth nightly Yes ROBERTO Benoit CNP   celecoxib (CELEBREX) 200 MG capsule Take 1 capsule by mouth daily Yes ROBERTO Benoit CNP   Erenumab-aooe 140 MG/ML SOAJ Inject 140 mg into the skin every 30 days Yes Jad Caldwell MD   ezetimibe (ZETIA) 10 MG tablet Take 1 tablet by mouth daily Yes Jad Caldwell MD   budesonide-formoterol (SYMBICORT) 160-4.5 MCG/ACT AERO Inhale 2 puffs into the lungs 2 times daily Yes Jad Caldwell MD   albuterol sulfate HFA (PROVENTIL;VENTOLIN;PROAIR) 108 (90 Base) MCG/ACT inhaler Inhale 2 puffs into the lungs every 4 hours as needed for Wheezing Yes Jad Caldwell MD   metoclopramide (REGLAN) 10 MG tablet Take 1 tablet by mouth 3 times daily (with meals) Yes Jad Caldwell MD   acetaminophen (TYLENOL) 650 MG extended release tablet Take 1,300 mg by mouth in the morning and at bedtime Yes Historical Provider, MD   calcium carbonate (OS-CHARLES) 1250 (500 Ca) MG chewable tablet Take 1 tablet by mouth three times a week Yes Historical Provider, MD   vitamin D (CHOLECALCIFEROL) 25 MCG (1000 UT) TABS tablet Take 1,000 Units by mouth daily Yes Historical Provider, MD   onabotulinumtoxin A (BOTOX) 100 units injection Inject 100 Units into the muscle once Every 3 month.  Yes Historical Provider, MD   RA ALLERGY RELIEF 180 MG tablet  Yes Historical Provider, MD Alexander (Including outside providers/suppliers regularly involved in providing care):     Patient Care Team:  Carla Jackson MD as PCP - General (Internal Medicine)  Carla Jackson MD as PCP - Empaneled Provider     Reviewed and updated this visit:  Tobacco  Allergies  Meds  Problems  Med Hx  Surg Hx  Soc Hx  Fam Hx               Carla Jackson MD

## 2023-02-08 NOTE — PROGRESS NOTES
Jimi Dhaliwal (:  1950) is a 81 y.o. female,Established patient, here for evaluation of the following chief complaint(s):  Medicare AWV, Cholesterol Problem, and COPD         ASSESSMENT/PLAN:    1. Medicare annual wellness visit, subsequent  2. Major depressive disorder with single episode, in partial remission ([facility] [address]  3. Anxiety  4. Sleep disturbance  5. Other fatigue  -     TSH; Future  6. Mild intermittent asthma without complication  7. Seasonal allergies  8. Gastroparesis  -     Vitamin B12; Future  -     Vitamin D 25 Hydroxy; Future  9. Nausea  10. Gastroesophageal reflux disease without esophagitis  -     Vitamin B12; Future  -     Vitamin D 25 Hydroxy; Future  11. Hiatal hernia  -     Vitamin B12; Future  -     Vitamin D 25 Hydroxy; Future  12. Chronic intractable headache, unspecified headache type  13. Other migraine without status migrainosus, not intractable  14. History of meningitis  15. History of Lyme disease  16. Chronic pain syndrome  17. Neck pain  18. Cervical stenosis of spine  19. Heart murmur  20. Hyperlipidemia, unspecified hyperlipidemia type  -     Lipid Panel; Future  -     Comprehensive Metabolic Panel; Future  21. Impaired fasting blood sugar  -     Comprehensive Metabolic Panel; Future  -     Hemoglobin A1C; Future  22. Generalized osteoarthritis of multiple sites  23. Primary osteoarthritis of both shoulders  24. Primary osteoarthritis of both knees  25. S/p total knee replacement, bilateral  26. Postoperative anemia  -     CBC; Future  -     Iron and TIBC; Future  -     Ferritin; Future  27. Vitamin D deficiency, unspecified  28. Homozygous Factor V Leiden mutation ([facility] [address]  29.  Delta storage pool disease [facility])        Continue current medications  Continue Pristiq at same dosage  Continue trazodone at same dosage  Good sleep hygiene recommended  Obtain labs as ordered  Daily exercise and healthy diet encouraged  Continue Symbicort, Singulair and albuterol as needed  Continue Reglan twice daily and Zofran once daily in the morning  Follow-up with GI when due  Colonoscopy due in 2023  Continue Aimovig 140 mg monthly  Follow-up with neurology as needed  Follow-up with pain management as scheduled  Continue Zetia  Follow-up with Ortho as scheduled  Continue vitamin D supplementation  Follow-up with hematology as needed  COVID-19 booster recommended  Call with concerns      Return for Medicare Annual Wellness Visit in 1 year / routine follow up in 6 months. Subjective     HPI    Patient presents today for routine follow-up of her chronic medical problems as outlined below. Overall she states that she has been doing fairly well. She is still working 2 days out of the week. She did just recently have the stomach flu last Sunday. She also had a migraine headache all day on Saturday. She does have a history of major depression, anxiety and sleep disturbance for which she takes Pristiq and trazodone. She states that her symptoms have been very well controlled. She is fatigued but this has been chronic. She does have a history of mild intermittent asthma and allergies that are normally well controlled with Symbicort, Singulair and albuterol as needed. 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 She has been using albuterol throughout the day and this has been helpful. This is starting to get better. She does have a history of gastroparesis, nausea, GERD and hiatal hernia. She does continue on Reglan twice daily, Zofran once daily in the morning and Dexilant in the morning and Nexium at night. She was previously prescribed erythromycin for gastroparesis but this was too expensive. She was then given Reglan to use as needed when this flares up. This does flareup occasionally depending on what she is eating.   She was concerned about an esophageal motility disorder or possibly eosinophilic esophagitis but her last EGD was normal.  Her next colonoscopy is due in 2023. She does have a history of chronic headaches, migraine headaches and a history of meningitis and Lyme disease. She was previously followed by neurology but she has not followed up with him because he wanted her to see an acupuncturist in [place]. He thought her headaches were secondary to her neck pain/cervical stenosis. She does see pain management Dr. Annalee Quintana regularly and continues to get Botox every 3 months which is helpful. She was placed on Aimovig and her headaches have completely resolved with this. She does still get some occasional mild headaches. She did have a migraine on Saturday. She does have a history of chronic pain syndrome and she has had occipital injections with pain management in the past.  She was also tried on gabapentin but she was unable to tolerate this because of side effects of fatigue and unsteadiness. She also tried medical marijuana but she did not do very well with that either. She states that overall her neck pain has not really been bothering her. She does have a history of a heart murmur. She does have a history of hyperlipidemia that is treated with Zetia. She is tolerating this well. She does have a history of an impaired fasting blood sugar consistent with prediabetes. This is treated with diet and exercise. She states she has been eating protein at night or she will feel like she is going to pass out. She does drink a protein shake on the way to work. She has been exercising by doing physical therapy for her knee replacements. She does have a history of generalized osteoarthritis especially of both shoulders and both knees. She also has osteoarthritis of her left thumb. She has known bone spurs and arthritis with an unknown bicep injury in the left shoulder. She does continue to use Celebrex and Voltaren gel.   She does get injections with Dr. Baron Vigil regularly. She did recently have both knees replaced at the end of 2022. She has done very well with this. She has also had a right hip replaced in the past.  She did have a significant flareup of her left thumb arthritis about a month ago. She was previously seeing Dr. Clayton Wilder but he retired so she does have an appointment with Dr. Tyler Carrillo at [facility] in mid March for evaluation of this. She did have a postoperative anemia after both of her knee replacements. Her blood counts have not been rechecked. She was not placed on iron after her surgeries either. She does have a history of vitamin D deficiency and continues to take her vitamin D supplement regularly. She does have a history of homozygous factor V Leiden mutation and delta storage pool disease which are stable. She does have a hematologist, Dr. Betty Nolan that she follows with only if she has surgery. She did have COVID in November but has completely recovered from this without any long-term sequelae. Pap test is no longer recommended due to age  [de-identified] and DEXA scan are both up-to-date. She did have an abnormal screening mammogram, right diagnostic mammogram and breast ultrasound in June 2022. She then had breast biopsy that showed benign atrophic breast tissue with abundant dense hyalinized stromal fibrosis, containing scattered bland ducts and hemosiderin/chemo siderophages. Status post prior reduction mammoplasty in 2017. There was no atypia or neoplasm. It was then recommended that she have a repeat right diagnostic mammogram and she did have this 1 month ago. This did show dense breast tissue with biopsy changes noted. Postreduction surgical changes including oil cysts and dystrophic calcifications were again seen in the right breast but no suspicious masses, calcifications or other findings were noted. It was recommended that she return to bilateral screening mammogram in July 2023.   She has no other concerns at this time.  cmw      Review of Systems   Constitutional:  Positive for fatigue. Negative for appetite change, fever and unexpected weight change. HENT:  Negative for congestion, ear pain, hearing loss, postnasal drip, rhinorrhea, sinus pressure, sneezing, sore throat and tinnitus. Eyes:  Negative for photophobia, pain, redness and visual disturbance. Respiratory:  Negative for cough, chest tightness, shortness of breath, wheezing and stridor. Asthma normally well controlled with symbicort, singulair and albuterol. Recent flare due to exposure to smoke at work - now improving   Cardiovascular:  Negative for chest pain, palpitations and leg swelling. Gastrointestinal:  Negative for abdominal pain, blood in stool, constipation, diarrhea, nausea and vomiting. Nausea and abdominal pain secondary to hiatal hernia, GERD and gastroparesis is well-controlled with dexilant, Nexium, reglan and zofran    Endocrine: Negative for polydipsia, polyphagia and polyuria. Genitourinary:  Negative for decreased urine volume, dysuria, frequency, hematuria and urgency. Musculoskeletal:  Positive for arthralgias (of the shoulders / knee pain s/p knee replacements -  much better / left thumb pain), back pain and neck pain. Negative for myalgias. Skin:  Negative for color change, rash and wound. Allergic/Immunologic: Negative for immunocompromised state. Neurological:  Positive for headaches (improved with aimovig). Negative for dizziness, tremors, seizures, weakness, light-headedness and numbness. Hematological:  Negative for adenopathy. Does not bruise/bleed easily. Post op anemia after knee replacements   Psychiatric/Behavioral:  Positive for dysphoric mood (well controlled) and sleep disturbance (well controlled with trazodone). The patient is nervous/anxious (occasionally). Objective     Physical Exam  Vitals and nursing note reviewed.    Constitutional:       General: She is not in acute distress. Appearance: Normal appearance. She is well-developed and normal weight. She is not ill-appearing, toxic-appearing or diaphoretic. HENT:      Head: Normocephalic and atraumatic. Right Ear: Tympanic membrane, ear canal and external ear normal.      Left Ear: Tympanic membrane, ear canal and external ear normal.      Nose: Mucosal edema present. No congestion or rhinorrhea. Mouth/Throat:      Lips: Pink. Mouth: Mucous membranes are moist.      Pharynx: Oropharynx is clear. No pharyngeal swelling, oropharyngeal exudate or posterior oropharyngeal erythema. Eyes:      General: No scleral icterus. Right eye: No discharge. Left eye: No discharge. Pupils: Pupils are equal, round, and reactive to light. Neck:      Thyroid: No thyromegaly. Vascular: No JVD. Cardiovascular:      Rate and Rhythm: Normal rate and regular rhythm. Pulses: Normal pulses. Heart sounds: Murmur heard. Pulmonary:      Effort: Pulmonary effort is normal. No respiratory distress. Breath sounds: No transmitted upper airway sounds. No decreased breath sounds, wheezing, rhonchi or rales. Abdominal:      General: Bowel sounds are normal.      Palpations: Abdomen is soft. There is no mass. Tenderness: There is no abdominal tenderness. There is no guarding. Musculoskeletal:        Hands:       Cervical back: Normal range of motion. Tenderness present. No spasms. Muscular tenderness present. Back:       Right knee: No deformity. Left knee: No deformity. Right lower leg: Normal. No edema. Left lower leg: Normal. No edema. Comments: Well healing scars on both knees   Lymphadenopathy:      Cervical: No cervical adenopathy. Skin:     General: Skin is warm and dry. Capillary Refill: Capillary refill takes less than 2 seconds. Findings: No erythema or rash. Neurological:      General: No focal deficit present.       Mental Status: She is alert and oriented to person, place, and time. Cranial Nerves: No cranial nerve deficit. Coordination: Coordination normal.      Deep Tendon Reflexes: Reflexes are normal and symmetric. Psychiatric:         Attention and Perception: Attention normal.         Mood and Affect: Mood and affect normal. Mood is not anxious or depressed. Speech: Speech normal.         Behavior: Behavior normal.         Thought Content: Thought content normal.         Cognition and Memory: Cognition normal.          On this date 2/8/2023 I have spent 40 minutes reviewing previous notes, test results and face to face with the patient discussing the diagnosis and importance of compliance with the treatment plan as well as documenting on the day of the visit. An electronic signature was used to authenticate this note.     --Anya Olguin MD

## 2023-02-08 NOTE — PATIENT INSTRUCTIONS
Learning About Vision Tests  What are vision tests? The four most common vision tests are visual acuity tests, refraction, visual field tests, and color vision tests. Visual acuity (sharpness) tests  These tests are used: To see if you need glasses or contact lenses. To monitor an eye problem. To check an eye injury. Visual acuity tests are done as part of routine exams. You may also have this test when you get your 's license or apply for some types of jobs. Visual field tests  These tests are used: To check for vision loss in any area of your range of vision. To screen for certain eye diseases. To look for nerve damage after a stroke, head injury, or other problem that could reduce blood flow to the brain. Refraction and color tests  A refraction test is done to find the right prescription for glasses and contact lenses. A color vision test is done to check for color blindness. Color vision is often tested as part of a routine exam. You may also have this test when you apply for a job where recognizing different colors is important, such as , electronics, or the [employer]. How are vision tests done? Visual acuity test   You cover one eye at a time. You read aloud from a wall chart across the room. You read aloud from a small card that you hold in your hand. Refraction   You look into a special device. The device puts lenses of different strengths in front of each eye to see how strong your glasses or contact lenses need to be. Visual field tests   Your doctor may have you look through special machines. Or your doctor may simply have you stare straight ahead while they move a finger into and out of your field of vision. Color vision test   You look at pieces of printed test patterns in various colors. You say what number or symbol you see. Your doctor may have you trace the number or symbol using a pointer. How do these tests feel?   There is very little chance of having a problem from this test. If dilating drops are used for a vision test, they may make the eyes sting and cause a medicine taste in the mouth. Follow-up care is a key part of your treatment and safety. Be sure to make and go to all appointments, and call your doctor if you are having problems. It's also a good idea to know your test results and keep a list of the medicines you take. Where can you learn more? Go to http://www.knight.com/ and enter G551 to learn more about \"Learning About Vision Tests. \"  Current as of: October 12, 2022               Content Version: 13.5  © 7396-6858 [employer]. Care instructions adapted under license by [facility] ([facility]). If you have questions about a medical condition or this instruction, always ask your healthcare professional. [address] any warranty or liability for your use of this information. Advance Directives: Care Instructions  Overview  An advance directive is a legal way to state your wishes at the end of your life. It tells your family and your doctor what to do if you can't say what you want. There are two main types of advance directives. You can change them any time your wishes change. Living will. This form tells your family and your doctor your wishes about life support and other treatment. The form is also called a declaration. Medical power of . This form lets you name a person to make treatment decisions for you when you can't speak for yourself. This person is called a health care agent (health care proxy, health care surrogate). The form is also called a durable power of  for health care. If you do not have an advance directive, decisions about your medical care may be made by a family member, or by a doctor or a  who doesn't know you. It may help to think of an advance directive as a gift to the people who care for you.  If you have one, they won't have to make tough decisions by themselves. For more information, including forms for your state, see the [address] website (www.caringinfo.org/planning/advance-directives/). Follow-up care is a key part of your treatment and safety. Be sure to make and go to all appointments, and call your doctor if you are having problems. It's also a good idea to know your test results and keep a list of the medicines you take. What should you include in an advance directive? Many states have a unique advance directive form. (It may ask you to address specific issues.) Or you might use a universal form that's approved by many states. If your form doesn't tell you what to address, it may be hard to know what to include in your advance directive. Use the questions below to help you get started. Who do you want to make decisions about your medical care if you are not able to? What life-support measures do you want if you have a serious illness that gets worse over time or can't be cured? What are you most afraid of that might happen? (Maybe you're afraid of having pain, losing your independence, or being kept alive by machines.)  Where would you prefer to die? (Your home? A hospital? A nursing home?)  Do you want to donate your organs when you die? Do you want certain Ep[employer] practices performed before you die? When should you call for help? Be sure to contact your doctor if you have any questions. Where can you learn more? Go to http://www.knight.com/ and enter R264 to learn more about \"Advance Directives: Care Instructions. \"  Current as of: June 16, 2022               Content Version: 13.5  © 1037-4616 [employer]. Care instructions adapted under license by [facility] ([facility]). If you have questions about a medical condition or this instruction, always ask your healthcare professional. [address] any warranty or liability for your use of this information.            A Healthy Heart: Care Instructions  Your Care Instructions     Coronary artery disease, also called heart disease, occurs when a substance called plaque builds up in the vessels that supply oxygen-rich blood to your heart muscle. This can narrow the blood vessels and reduce blood flow. A heart attack happens when blood flow is completely blocked. A high-fat diet, smoking, and other factors increase the risk of heart disease. Your doctor has found that you have a chance of having heart disease. You can do lots of things to keep your heart healthy. It may not be easy, but you can change your diet, exercise more, and quit smoking. These steps really work to lower your chance of heart disease. Follow-up care is a key part of your treatment and safety. Be sure to make and go to all appointments, and call your doctor if you are having problems. It's also a good idea to know your test results and keep a list of the medicines you take. How can you care for yourself at home? Diet    Use less salt when you cook and eat. This helps lower your blood pressure. Taste food before salting. Add only a little salt when you think you need it. With time, your taste buds will adjust to less salt.     Eat fewer snack items, fast foods, canned soups, and other high-salt, high-fat, processed foods.     Read food labels and try to avoid saturated and trans fats. They increase your risk of heart disease by raising cholesterol levels.     Limit the amount of solid fat-butter, margarine, and shortening-you eat. Use olive, peanut, or canola oil when you cook. Bake, broil, and steam foods instead of frying them.     Eat a variety of fruit and vegetables every day. Dark green, deep orange, red, or yellow fruits and vegetables are especially good for you. Examples include spinach, carrots, peaches, and berries.     Foods high in fiber can reduce your cholesterol and provide important vitamins and minerals.  High-fiber foods include whole-grain cereals and breads, oatmeal, beans, brown rice, citrus fruits, and apples.     Eat lean proteins. Heart-healthy proteins include seafood, lean meats and poultry, eggs, beans, peas, nuts, seeds, and soy products.     Limit drinks and foods with added sugar. These include candy, desserts, and soda pop. Lifestyle changes    If your doctor recommends it, get more exercise. Walking is a good choice. Bit by bit, increase the amount you walk every day. Try for at least 30 minutes on most days of the week. You also may want to swim, bike, or do other activities.     Do not smoke. If you need help quitting, talk to your doctor about stop-smoking programs and medicines. These can increase your chances of quitting for good. Quitting smoking may be the most important step you can take to protect your heart. It is never too late to quit.     Limit alcohol to 2 drinks a day for men and 1 drink a day for women. Too much alcohol can cause health problems.     Manage other health problems such as diabetes, high blood pressure, and high cholesterol. If you think you may have a problem with alcohol or drug use, talk to your doctor. Medicines    Take your medicines exactly as prescribed. Call your doctor if you think you are having a problem with your medicine.     If your doctor recommends aspirin, take the amount directed each day. Make sure you take aspirin and not another kind of pain reliever, such as acetaminophen (Tylenol). When should you call for help? Call 911 if you have symptoms of a heart attack. These may include:    Chest pain or pressure, or a strange feeling in the chest.     Sweating.     Shortness of breath.     Pain, pressure, or a strange feeling in the back, neck, jaw, or upper belly or in one or both shoulders or arms.     Lightheadedness or sudden weakness.     A fast or irregular heartbeat. After you call 911, the  may tell you to chew 1 adult-strength or 2 to 4 low-dose aspirin. Wait for an ambulance.  Do not try to drive yourself. Watch closely for changes in your health, and be sure to contact your doctor if you have any problems. Where can you learn more? Go to http://www.knight.com/ and enter F075 to learn more about \"A Healthy Heart: Care Instructions. \"  Current as of: September 7, 2022               Content Version: 13.5  © 2006-2022 [employer]. Care instructions adapted under license by [facility] ([facility]). If you have questions about a medical condition or this instruction, always ask your healthcare professional. [address] any warranty or liability for your use of this information. Personalized Preventive Plan for Mat Older - 2/8/2023  Medicare offers a range of preventive health benefits. Some of the tests and screenings are paid in full while other may be subject to a deductible, co-insurance, and/or copay. Some of these benefits include a comprehensive review of your medical history including lifestyle, illnesses that may run in your family, and various assessments and screenings as appropriate. After reviewing your medical record and screening and assessments performed today your provider may have ordered immunizations, labs, imaging, and/or referrals for you. A list of these orders (if applicable) as well as your Preventive Care list are included within your After Visit Summary for your review. Other Preventive Recommendations:    A preventive eye exam performed by an eye specialist is recommended every 1-2 years to screen for glaucoma; cataracts, macular degeneration, and other eye disorders. A preventive dental visit is recommended every 6 months. Try to get at least 150 minutes of exercise per week or 10,000 steps per day on a pedometer . Order or download the FREE \"Exercise & Physical Activity: Your Everyday Guide\" from [employer]. Call 5-640.788.4441 or search [employer] online.   You need 0901-1592 mg of calcium and 4359-5960 IU of vitamin D per day. It is possible to meet your calcium requirement with diet alone, but a vitamin D supplement is usually necessary to meet this goal.  When exposed to the sun, use a sunscreen that protects against both UVA and UVB radiation with an SPF of 30 or greater. Reapply every 2 to 3 hours or after sweating, drying off with a towel, or swimming. Always wear a seat belt when traveling in a car. Always wear a helmet when riding a bicycle or motorcycle.

## 2023-02-09 ENCOUNTER — HOSPITAL ENCOUNTER (OUTPATIENT)
Age: 73
Setting detail: SPECIMEN
Discharge: HOME | End: 2023-02-09

## 2023-02-09 DIAGNOSIS — K44.9 HIATAL HERNIA: ICD-10-CM

## 2023-02-09 DIAGNOSIS — R53.83 OTHER FATIGUE: ICD-10-CM

## 2023-02-09 DIAGNOSIS — K21.9 GASTROESOPHAGEAL REFLUX DISEASE WITHOUT ESOPHAGITIS: ICD-10-CM

## 2023-02-09 DIAGNOSIS — D64.9 POSTOPERATIVE ANEMIA: ICD-10-CM

## 2023-02-09 DIAGNOSIS — K31.84 GASTROPARESIS: ICD-10-CM

## 2023-02-09 DIAGNOSIS — E78.5 HYPERLIPIDEMIA, UNSPECIFIED HYPERLIPIDEMIA TYPE: ICD-10-CM

## 2023-02-09 DIAGNOSIS — R73.01 IMPAIRED FASTING BLOOD SUGAR: ICD-10-CM

## 2023-02-09 LAB
25(OH)D3 SERPL-MCNC: 53.2 NG/ML
ALBUMIN SERPL-MCNC: 4.4 G/DL (ref 3.5–5.2)
ALBUMIN/GLOBULIN RATIO: 1.9 (ref 1–2.5)
ALP SERPL-CCNC: 75 U/L (ref 35–104)
ALT SERPL-CCNC: 19 U/L (ref 5–33)
ANION GAP SERPL CALCULATED.3IONS-SCNC: 14 MMOL/L (ref 9–17)
AST SERPL-CCNC: 22 U/L
BILIRUB SERPL-MCNC: 0.2 MG/DL (ref 0.3–1.2)
BUN SERPL-MCNC: 17 MG/DL (ref 8–23)
CALCIUM SERPL-MCNC: 9.5 MG/DL (ref 8.6–10.4)
CHLORIDE SERPL-SCNC: 102 MMOL/L (ref 98–107)
CHOLEST SERPL-MCNC: 223 MG/DL
CHOLESTEROL/HDL RATIO: 4.3
CO2 SERPL-SCNC: 24 MMOL/L (ref 20–31)
CREAT SERPL-MCNC: 0.63 MG/DL (ref 0.5–0.9)
EST. AVERAGE GLUCOSE BLD GHB EST-MCNC: 108 MG/DL
FERRITIN SERPL-MCNC: 137 NG/ML (ref 13–150)
GFR SERPL CREATININE-BSD FRML MDRD: >60 ML/MIN/1.73M2
GLUCOSE SERPL-MCNC: 95 MG/DL (ref 70–99)
HBA1C MFR BLD: 5.4 % (ref 4–6)
HCT VFR BLD AUTO: 40.4 % (ref 36.3–47.1)
HDLC SERPL-MCNC: 52 MG/DL
HGB BLD-MCNC: 12.8 G/DL (ref 11.9–15.1)
IRON SATURATION: 25 % (ref 20–55)
IRON SERPL-MCNC: 77 UG/DL (ref 37–145)
LDLC SERPL CALC-MCNC: 146 MG/DL (ref 0–130)
MCH RBC QN AUTO: 28.4 PG (ref 25.2–33.5)
MCHC RBC AUTO-ENTMCNC: 31.7 G/DL (ref 28.4–34.8)
MCV RBC AUTO: 89.8 FL (ref 82.6–102.9)
NRBC AUTOMATED: 0 PER 100 WBC
PDW BLD-RTO: 15 % (ref 11.8–14.4)
PLATELET # BLD AUTO: 266 K/UL (ref 138–453)
PMV BLD AUTO: 9.1 FL (ref 8.1–13.5)
POTASSIUM SERPL-SCNC: 4.3 MMOL/L (ref 3.7–5.3)
PROT SERPL-MCNC: 6.7 G/DL (ref 6.4–8.3)
RBC # BLD: 4.5 M/UL (ref 3.95–5.11)
SODIUM SERPL-SCNC: 140 MMOL/L (ref 135–144)
TIBC SERPL-MCNC: 311 UG/DL (ref 250–450)
TRIGL SERPL-MCNC: 127 MG/DL
TSH SERPL-ACNC: 1.5 UIU/ML (ref 0.3–5)
UNSATURATED IRON BINDING CAPACITY: 234 UG/DL (ref 112–347)
VIT B12 SERPL-MCNC: 715 PG/ML (ref 232–1245)
WBC # BLD AUTO: 4.9 K/UL (ref 3.5–11.3)

## 2023-03-14 DIAGNOSIS — J45.20 MILD INTERMITTENT ASTHMA WITHOUT COMPLICATION: ICD-10-CM

## 2023-03-14 DIAGNOSIS — E78.5 HYPERLIPIDEMIA, UNSPECIFIED HYPERLIPIDEMIA TYPE: ICD-10-CM

## 2023-03-15 RX ORDER — BUDESONIDE AND FORMOTEROL FUMARATE DIHYDRATE 160; 4.5 UG/1; UG/1
2 AEROSOL RESPIRATORY (INHALATION) 2 TIMES DAILY
Qty: 30.6 G | Refills: 1 | Status: SHIPPED | OUTPATIENT
Start: 2023-03-15 | End: 2024-03-15

## 2023-03-15 RX ORDER — EZETIMIBE 10 MG/1
10 TABLET ORAL DAILY
Qty: 90 TABLET | Refills: 1 | Status: SHIPPED | OUTPATIENT
Start: 2023-03-15 | End: 2024-03-15

## 2023-03-15 NOTE — TELEPHONE ENCOUNTER
LOV 2-8-23  LRF Zetia 9-15-22, Symbicort 9-12-22    Health Maintenance   Topic Date Due    COVID-19 Vaccine (4 - Booster for Moderna series) 12/21/2021    Colorectal Cancer Screen  11/08/2023    Depression Monitoring  02/08/2024    A1C test (Diabetic or Prediabetic)  02/09/2024    Annual Wellness Visit (AWV)  02/09/2024    Breast cancer screen  01/07/2025    Lipids  02/09/2028    DTaP/Tdap/Td vaccine (3 - Td or Tdap) 08/16/2031    DEXA (modify frequency per FRAX score)  Completed    Shingles vaccine  Completed    Pneumococcal 65+ years Vaccine  Completed    Hepatitis C screen  Completed    Hepatitis A vaccine  Aged Out    Hib vaccine  Aged Out    Meningococcal (ACWY) vaccine  Aged Out             (applicable per patient's age: Cancer Screenings, Depression Screening, Fall Risk Screening, Immunizations)    Hemoglobin A1C (%)   Date Value   02/09/2023 5.4   08/11/2021 5.5     LDL Cholesterol (mg/dL)   Date Value   02/09/2023 146 (H)     LDL Calculated (mg/dL)   Date Value   03/31/2021 120     AST (U/L)   Date Value   02/09/2023 22     ALT (U/L)   Date Value   02/09/2023 19     BUN (mg/dL)   Date Value   02/09/2023 17      (goal A1C is < 7)   (goal LDL is <100) need 30-50% reduction from baseline     BP Readings from Last 3 Encounters:   02/08/23 112/76   09/12/22 116/78   05/26/22 132/80    (goal /80)      All Future Testing planned in Care[facility]:  Lab Frequency Next Occurrence   CARLITA CHRISTAL DIGITAL SCREEN BILATERAL Once 12/12/2022   CARLITA DIGITAL DIAGNOSTIC W OR WO CAD RIGHT Once 12/20/2022       Next Visit Date:  Future Appointments   Date Time Provider Petey Moon   4/3/2023  9:30 AM Kiersten Cisneros MD [facility] PAIN [facility]   8/17/2023  9:30 AM MD Greg Zavaleta [facility]   2/12/2024  9:30 AM MD Cullen Zavaleta [facility]            Patient Active Problem List:     Homozygous Factor V Leiden mutation ([address]     Mild intermittent asthma without complication Delta storage pool disease [facility])     Cervical stenosis of spine     Primary osteoarthritis of both shoulders     Primary osteoarthritis of both knees     Hyperlipidemia     History of meningitis     Gastroesophageal reflux disease     Hiatal hernia     Colon adenoma     Major depressive disorder with single episode, in partial remission ([facility])     Anxiety     Sleep disturbance     History of Lyme disease     Chronic pain syndrome     Chronic intractable headache     Gastroparesis     Neck pain     Impaired fasting blood sugar     Vitamin D deficiency, unspecified

## 2023-03-16 DIAGNOSIS — E78.5 HYPERLIPIDEMIA, UNSPECIFIED HYPERLIPIDEMIA TYPE: ICD-10-CM

## 2023-03-16 DIAGNOSIS — F32.4 MAJOR DEPRESSIVE DISORDER WITH SINGLE EPISODE, IN PARTIAL REMISSION (HCC): ICD-10-CM

## 2023-03-16 DIAGNOSIS — G47.9 SLEEP DISTURBANCE: ICD-10-CM

## 2023-03-16 RX ORDER — TRAZODONE HYDROCHLORIDE 100 MG/1
200 TABLET ORAL NIGHTLY
Qty: 180 TABLET | Refills: 1 | Status: SHIPPED | OUTPATIENT
Start: 2023-03-16 | End: 2024-03-15

## 2023-03-16 RX ORDER — DESVENLAFAXINE 50 MG/1
50 TABLET, EXTENDED RELEASE ORAL DAILY
Qty: 90 TABLET | Refills: 1 | Status: SHIPPED | OUTPATIENT
Start: 2023-03-16 | End: 2024-03-15

## 2023-03-16 NOTE — TELEPHONE ENCOUNTER
Pt states that she should be taking 2 trazadone at night but the RX was only called for one.      Last OV: 2/8/23  Last refill: 2/2/23    Health Maintenance   Topic Date Due    COVID-19 Vaccine (4 - Booster for Moderna series) 12/21/2021    Colorectal Cancer Screen  11/08/2023    Depression Monitoring  02/08/2024    A1C test (Diabetic or Prediabetic)  02/09/2024    Annual Wellness Visit (AWV)  02/09/2024    Breast cancer screen  01/07/2025    Lipids  02/09/2028    DTaP/Tdap/Td vaccine (3 - Td or Tdap) 08/16/2031    DEXA (modify frequency per FRAX score)  Completed    Shingles vaccine  Completed    Pneumococcal 65+ years Vaccine  Completed    Hepatitis C screen  Completed    Hepatitis A vaccine  Aged Out    Hib vaccine  Aged Out    Meningococcal (ACWY) vaccine  Aged Out             (applicable per patient's age: Cancer Screenings, Depression Screening, Fall Risk Screening, Immunizations)    Hemoglobin A1C (%)   Date Value   02/09/2023 5.4   08/11/2021 5.5     LDL Cholesterol (mg/dL)   Date Value   02/09/2023 146 (H)     LDL Calculated (mg/dL)   Date Value   03/31/2021 120     AST (U/L)   Date Value   02/09/2023 22     ALT (U/L)   Date Value   02/09/2023 19     BUN (mg/dL)   Date Value   02/09/2023 17      (goal A1C is < 7)   (goal LDL is <100) need 30-50% reduction from baseline     BP Readings from Last 3 Encounters:   02/08/23 112/76   09/12/22 116/78   05/26/22 132/80    (goal /80)      All Future Testing planned in CarePATH:  Lab Frequency Next Occurrence   CARLITA CHRISTAL DIGITAL SCREEN BILATERAL Once 12/12/2022   CARLITA DIGITAL DIAGNOSTIC W OR WO CAD RIGHT Once 12/20/2022       Next Visit Date:  Future Appointments   Date Time Provider Petey Moon   4/3/2023  9:30 AM Nedra Ozuna MD [facility] PAIN [facility]   8/17/2023  9:30 AM MD Greg Alanzi [facility]   2/12/2024  9:30 AM Pepper Muster, MD Tasia Frankel [facility]            Patient Active Problem List:     Homozygous Factor V Leiden mutation ([facility] [address]     Mild intermittent asthma without complication     Delta storage pool disease [facility])     Cervical stenosis of spine     Primary osteoarthritis of both shoulders     Primary osteoarthritis of both knees     Hyperlipidemia     History of meningitis     Gastroesophageal reflux disease     Hiatal hernia     Colon adenoma     Major depressive disorder with single episode, in partial remission ([facility])     Anxiety     Sleep disturbance     History of Lyme disease     Chronic pain syndrome     Chronic intractable headache     Gastroparesis     Neck pain     Impaired fasting blood sugar     Vitamin D deficiency, unspecified

## 2023-03-17 RX ORDER — BEMPEDOIC ACID 180 MG/1
180 TABLET, FILM COATED ORAL DAILY
Qty: 30 TABLET | Refills: 5 | OUTPATIENT
Start: 2023-03-17 | End: 2023-09-13

## 2023-03-17 NOTE — TELEPHONE ENCOUNTER
Clinical staff:  please ensure medications / refills are reviewed and corrected before sent to provider for approval.      The trazodone was sent incorrectly last month and sent incorrectly to me again. Please make sure you send the refill correctly. I corrected this and sent to the pharmacy. Why was there a refill for nexletol sent? This was sent several days ago and I have a prior auth for this.

## 2023-04-03 ENCOUNTER — INITIAL CONSULT (OUTPATIENT)
Dept: PAIN MANAGEMENT | Age: 73
End: 2023-04-03
Payer: MEDICARE

## 2023-04-03 VITALS — BODY MASS INDEX: 29.06 KG/M2 | WEIGHT: 164 LBS | HEIGHT: 63 IN

## 2023-04-03 DIAGNOSIS — G43.819 OTHER MIGRAINE WITHOUT STATUS MIGRAINOSUS, INTRACTABLE: Primary | ICD-10-CM

## 2023-04-03 DIAGNOSIS — R51.9 INTRACTABLE HEADACHE, UNSPECIFIED CHRONICITY PATTERN, UNSPECIFIED HEADACHE TYPE: ICD-10-CM

## 2023-04-03 PROCEDURE — 99204 OFFICE O/P NEW MOD 45 MIN: CPT | Performed by: PAIN MEDICINE

## 2023-04-03 PROCEDURE — G8427 DOCREV CUR MEDS BY ELIG CLIN: HCPCS | Performed by: PAIN MEDICINE

## 2023-04-03 PROCEDURE — 1123F ACP DISCUSS/DSCN MKR DOCD: CPT | Performed by: PAIN MEDICINE

## 2023-04-03 PROCEDURE — G8400 PT W/DXA NO RESULTS DOC: HCPCS | Performed by: PAIN MEDICINE

## 2023-04-03 PROCEDURE — G8417 CALC BMI ABV UP PARAM F/U: HCPCS | Performed by: PAIN MEDICINE

## 2023-04-03 PROCEDURE — 1036F TOBACCO NON-USER: CPT | Performed by: PAIN MEDICINE

## 2023-04-03 PROCEDURE — 3017F COLORECTAL CA SCREEN DOC REV: CPT | Performed by: PAIN MEDICINE

## 2023-04-03 PROCEDURE — 1090F PRES/ABSN URINE INCON ASSESS: CPT | Performed by: PAIN MEDICINE

## 2023-04-03 ASSESSMENT — ENCOUNTER SYMPTOMS
NAUSEA: 1
VOMITING: 0

## 2023-04-03 NOTE — PROGRESS NOTES
Normal appearance. Pulmonary:      Effort: Pulmonary effort is normal.   Neurological:      Mental Status: She is alert. Psychiatric:         Attention and Perception: Attention and perception normal.         Mood and Affect: Mood and affect normal.       Record/Diagnostics Review:    As above, I did review the imaging        Assessment:  1. Other migraine without status migrainosus, intractable    2. Intractable headache, unspecified chronicity pattern, unspecified headache type        Treatment Plan:  DISCUSSION: Treatment options discussed with patient and all questions answered to patient's satisfaction. OARRS Review: Reviewed and acceptable for medications prescribed. TREATMENT OPTIONS:     Discussed different treatment options including continued conservative care such as physical therapy, chiropractic care, acupuncture. Discussed different interventional options such as epidural steroids or medial branch blocks. Also discussed surgical evaluation. Discussed neurology evaluation. At this point wishes to continue with Botox injections, does quite well with this treatment. We will obtain previous records from her prior pain provider. Plan to continue with Botox injections every 12 weeks. Gus Lara M.D. I have reviewed the chief complaint and history of present illness (including ROS and PFSH) and vital documentation by my staff and I agree with their documentation and have added where applicable.

## 2023-04-04 ENCOUNTER — TELEPHONE (OUTPATIENT)
Dept: PAIN MANAGEMENT | Age: 73
End: 2023-04-04

## 2023-04-04 DIAGNOSIS — G89.29 CHRONIC INTRACTABLE HEADACHE, UNSPECIFIED HEADACHE TYPE: ICD-10-CM

## 2023-04-04 DIAGNOSIS — R51.9 CHRONIC INTRACTABLE HEADACHE, UNSPECIFIED HEADACHE TYPE: ICD-10-CM

## 2023-04-04 NOTE — TELEPHONE ENCOUNTER
[facility]:  Lab Frequency Next Occurrence   CARLITA CHRISTAL DIGITAL SCREEN BILATERAL Once 12/12/2022   CARLITA DIGITAL DIAGNOSTIC W OR WO CAD RIGHT Once 12/20/2022       Next Visit Date:  Future Appointments   Date Time Provider Petey Moon   7/10/2023  9:30 AM Sandra Leavitt MD [address] [place] [address]   8/17/2023  9:30 AM Geovanni Ellsworth MD [facility]   2/12/2024  9:30 AM Geovanni Ellsworth MD Jonna Patricia [facility]            Patient Active Problem List:     Homozygous Factor V Leiden mutation ([facility] [address]     Mild intermittent asthma without complication     Delta storage pool disease [facility])     Cervical stenosis of spine     Primary osteoarthritis of both shoulders     Primary osteoarthritis of both knees     Hyperlipidemia     History of meningitis     Gastroesophageal reflux disease     Hiatal hernia     Colon adenoma     Major depressive disorder with single episode, in partial remission ([facility])     Anxiety     Sleep disturbance     History of Lyme disease     Chronic pain syndrome     Chronic intractable headache     Gastroparesis     Neck pain     Impaired fasting blood sugar     Vitamin D deficiency, unspecified

## 2023-05-08 ENCOUNTER — TELEPHONE (OUTPATIENT)
Dept: PAIN MANAGEMENT | Age: 73
End: 2023-05-08

## 2023-05-23 DIAGNOSIS — R51.9 CHRONIC INTRACTABLE HEADACHE, UNSPECIFIED HEADACHE TYPE: ICD-10-CM

## 2023-05-23 DIAGNOSIS — K31.84 GASTROPARESIS: ICD-10-CM

## 2023-05-23 DIAGNOSIS — G89.29 CHRONIC INTRACTABLE HEADACHE, UNSPECIFIED HEADACHE TYPE: ICD-10-CM

## 2023-05-23 RX ORDER — METOCLOPRAMIDE 10 MG/1
10 TABLET ORAL
Qty: 270 TABLET | Refills: 1 | Status: SHIPPED | OUTPATIENT
Start: 2023-05-23

## 2023-05-23 RX ORDER — ERENUMAB-AOOE 140 MG/ML
140 INJECTION, SOLUTION SUBCUTANEOUS
Qty: 1 ML | Refills: 2 | Status: SHIPPED | OUTPATIENT
Start: 2023-05-23

## 2023-06-12 DIAGNOSIS — Z12.31 BREAST CANCER SCREENING BY MAMMOGRAM: ICD-10-CM

## 2023-06-19 ENCOUNTER — OFFICE VISIT (OUTPATIENT)
Dept: PAIN MANAGEMENT | Age: 73
End: 2023-06-19

## 2023-06-19 DIAGNOSIS — G43.819 OTHER MIGRAINE WITHOUT STATUS MIGRAINOSUS, INTRACTABLE: Primary | ICD-10-CM

## 2023-06-19 ASSESSMENT — ENCOUNTER SYMPTOMS
VOMITING: 0
BLURRED VISION: 0
BOWEL INCONTINENCE: 0
NAUSEA: 1
COUGH: 0

## 2023-06-19 NOTE — PROGRESS NOTES
extended release tablet, Take 1 tablet by mouth daily, Disp: 90 tablet, Rfl: 1    ezetimibe (ZETIA) 10 MG tablet, Take 1 tablet by mouth daily, Disp: 90 tablet, Rfl: 1    budesonide-formoterol (SYMBICORT) 160-4.5 MCG/ACT AERO, Inhale 2 puffs into the lungs 2 times daily, Disp: 30.6 g, Rfl: 1    celecoxib (CELEBREX) 200 MG capsule, Take 1 capsule by mouth daily, Disp: 90 capsule, Rfl: 3    montelukast (SINGULAIR) 10 MG tablet, Take 1 tablet by mouth nightly, Disp: 90 tablet, Rfl: 3    dexlansoprazole (DEXILANT) 60 MG CPDR delayed release capsule, Take 1 capsule by mouth daily, Disp: 90 capsule, Rfl: 3    albuterol sulfate HFA (PROVENTIL;VENTOLIN;PROAIR) 108 (90 Base) MCG/ACT inhaler, Inhale 2 puffs into the lungs every 4 hours as needed for Wheezing, Disp: 1 each, Rfl: 2    acetaminophen (TYLENOL) 650 MG extended release tablet, Take 1,300 mg by mouth in the morning and at bedtime, Disp: , Rfl:     calcium carbonate (OS-CHARLES) 1250 (500 Ca) MG chewable tablet, Take 1 tablet by mouth three times a week, Disp: , Rfl:     vitamin D (CHOLECALCIFEROL) 25 MCG (1000 UT) TABS tablet, Take 1,000 Units by mouth daily, Disp: , Rfl:     onabotulinumtoxin A (BOTOX) 100 units injection, Inject 100 Units into the muscle once Every 3 month., Disp: , Rfl:     RA ALLERGY RELIEF 180 MG tablet, , Disp: , Rfl: 1    Family History   Problem Relation Age of Onset    Heart Attack Mother     Heart Disease Mother     Heart Attack Brother     Heart Disease Brother        Social History     Socioeconomic History    Marital status: Single     Spouse name: Not on file    Number of children: Not on file    Years of education: Not on file    Highest education level: Not on file   Occupational History    Not on file   Tobacco Use    Smoking status: Never    Smokeless tobacco: Never   Substance and Sexual Activity    Alcohol use: No    Drug use: No    Sexual activity: Not on file   Other Topics Concern    Not on file   Social History Narrative    Not
reviewed the chief complaint and history of present illness (including ROS and PFSH) and vital documentation by my staff and I agree with their documentation and have added where applicable.

## 2023-08-14 SDOH — ECONOMIC STABILITY: FOOD INSECURITY: WITHIN THE PAST 12 MONTHS, YOU WORRIED THAT YOUR FOOD WOULD RUN OUT BEFORE YOU GOT MONEY TO BUY MORE.: NEVER TRUE

## 2023-08-14 SDOH — ECONOMIC STABILITY: INCOME INSECURITY: HOW HARD IS IT FOR YOU TO PAY FOR THE VERY BASICS LIKE FOOD, HOUSING, MEDICAL CARE, AND HEATING?: NOT HARD AT ALL

## 2023-08-14 SDOH — ECONOMIC STABILITY: HOUSING INSECURITY
IN THE LAST 12 MONTHS, WAS THERE A TIME WHEN YOU DID NOT HAVE A STEADY PLACE TO SLEEP OR SLEPT IN A SHELTER (INCLUDING NOW)?: NO

## 2023-08-14 SDOH — ECONOMIC STABILITY: FOOD INSECURITY: WITHIN THE PAST 12 MONTHS, THE FOOD YOU BOUGHT JUST DIDN'T LAST AND YOU DIDN'T HAVE MONEY TO GET MORE.: NEVER TRUE

## 2023-08-14 SDOH — ECONOMIC STABILITY: TRANSPORTATION INSECURITY
IN THE PAST 12 MONTHS, HAS LACK OF TRANSPORTATION KEPT YOU FROM MEETINGS, WORK, OR FROM GETTING THINGS NEEDED FOR DAILY LIVING?: NO

## 2023-08-17 ENCOUNTER — OFFICE VISIT (OUTPATIENT)
Dept: FAMILY MEDICINE CLINIC | Age: 73
End: 2023-08-17

## 2023-08-17 ENCOUNTER — HOSPITAL ENCOUNTER (OUTPATIENT)
Age: 73
Setting detail: SPECIMEN
Discharge: HOME | End: 2023-08-17

## 2023-08-17 VITALS
DIASTOLIC BLOOD PRESSURE: 80 MMHG | WEIGHT: 164.8 LBS | SYSTOLIC BLOOD PRESSURE: 118 MMHG | OXYGEN SATURATION: 97 % | RESPIRATION RATE: 16 BRPM | HEART RATE: 72 BPM | BODY MASS INDEX: 29.19 KG/M2 | TEMPERATURE: 97 F

## 2023-08-17 DIAGNOSIS — R53.83 OTHER FATIGUE: ICD-10-CM

## 2023-08-17 DIAGNOSIS — K31.84 GASTROPARESIS: ICD-10-CM

## 2023-08-17 DIAGNOSIS — Z86.19 HISTORY OF LYME DISEASE: ICD-10-CM

## 2023-08-17 DIAGNOSIS — G43.809 OTHER MIGRAINE WITHOUT STATUS MIGRAINOSUS, NOT INTRACTABLE: ICD-10-CM

## 2023-08-17 DIAGNOSIS — M17.0 PRIMARY OSTEOARTHRITIS OF BOTH KNEES: ICD-10-CM

## 2023-08-17 DIAGNOSIS — G89.29 CHRONIC INTRACTABLE HEADACHE, UNSPECIFIED HEADACHE TYPE: ICD-10-CM

## 2023-08-17 DIAGNOSIS — R01.1 HEART MURMUR: ICD-10-CM

## 2023-08-17 DIAGNOSIS — D68.51 HOMOZYGOUS FACTOR V LEIDEN MUTATION (HCC): ICD-10-CM

## 2023-08-17 DIAGNOSIS — M19.011 PRIMARY OSTEOARTHRITIS OF BOTH SHOULDERS: ICD-10-CM

## 2023-08-17 DIAGNOSIS — K21.9 GASTROESOPHAGEAL REFLUX DISEASE WITHOUT ESOPHAGITIS: ICD-10-CM

## 2023-08-17 DIAGNOSIS — F32.4 MAJOR DEPRESSIVE DISORDER WITH SINGLE EPISODE, IN PARTIAL REMISSION (HCC): Primary | ICD-10-CM

## 2023-08-17 DIAGNOSIS — Z96.653 S/P TOTAL KNEE REPLACEMENT, BILATERAL: ICD-10-CM

## 2023-08-17 DIAGNOSIS — E78.5 HYPERLIPIDEMIA, UNSPECIFIED HYPERLIPIDEMIA TYPE: ICD-10-CM

## 2023-08-17 DIAGNOSIS — Z86.61 HISTORY OF MENINGITIS: ICD-10-CM

## 2023-08-17 DIAGNOSIS — M54.2 NECK PAIN: ICD-10-CM

## 2023-08-17 DIAGNOSIS — J45.20 MILD INTERMITTENT ASTHMA WITHOUT COMPLICATION: ICD-10-CM

## 2023-08-17 DIAGNOSIS — D69.1 DELTA STORAGE POOL DISEASE (HCC): ICD-10-CM

## 2023-08-17 DIAGNOSIS — R51.9 CHRONIC INTRACTABLE HEADACHE, UNSPECIFIED HEADACHE TYPE: ICD-10-CM

## 2023-08-17 DIAGNOSIS — M48.02 CERVICAL STENOSIS OF SPINE: ICD-10-CM

## 2023-08-17 DIAGNOSIS — D64.9 POSTOPERATIVE ANEMIA: ICD-10-CM

## 2023-08-17 DIAGNOSIS — G47.9 SLEEP DISTURBANCE: ICD-10-CM

## 2023-08-17 DIAGNOSIS — M19.012 PRIMARY OSTEOARTHRITIS OF BOTH SHOULDERS: ICD-10-CM

## 2023-08-17 DIAGNOSIS — R11.0 NAUSEA: ICD-10-CM

## 2023-08-17 DIAGNOSIS — G89.4 CHRONIC PAIN SYNDROME: ICD-10-CM

## 2023-08-17 DIAGNOSIS — R73.01 IMPAIRED FASTING BLOOD SUGAR: ICD-10-CM

## 2023-08-17 DIAGNOSIS — K44.9 HIATAL HERNIA: ICD-10-CM

## 2023-08-17 DIAGNOSIS — F41.9 ANXIETY: ICD-10-CM

## 2023-08-17 DIAGNOSIS — M15.9 GENERALIZED OSTEOARTHRITIS OF MULTIPLE SITES: ICD-10-CM

## 2023-08-17 DIAGNOSIS — R53.82 CHRONIC FATIGUE: ICD-10-CM

## 2023-08-17 DIAGNOSIS — J30.2 SEASONAL ALLERGIES: ICD-10-CM

## 2023-08-17 DIAGNOSIS — E55.9 VITAMIN D DEFICIENCY, UNSPECIFIED: ICD-10-CM

## 2023-08-17 LAB
ALBUMIN SERPL-MCNC: 4.9 G/DL (ref 3.5–5.2)
ALBUMIN/GLOB SERPL: 1.8 {RATIO} (ref 1–2.5)
ALP SERPL-CCNC: 87 U/L (ref 35–104)
ALT SERPL-CCNC: 24 U/L (ref 5–33)
ANION GAP SERPL CALCULATED.3IONS-SCNC: 13 MMOL/L (ref 9–17)
AST SERPL-CCNC: 22 U/L
BASOPHILS # BLD: 0.07 K/UL (ref 0–0.2)
BASOPHILS NFR BLD: 1 % (ref 0–2)
BILIRUB SERPL-MCNC: 0.3 MG/DL (ref 0.3–1.2)
BUN SERPL-MCNC: 14 MG/DL (ref 8–23)
CALCIUM SERPL-MCNC: 9.8 MG/DL (ref 8.6–10.4)
CHLORIDE SERPL-SCNC: 100 MMOL/L (ref 98–107)
CO2 SERPL-SCNC: 25 MMOL/L (ref 20–31)
CREAT SERPL-MCNC: 0.8 MG/DL (ref 0.5–0.9)
EOSINOPHIL # BLD: 0.22 K/UL (ref 0–0.44)
EOSINOPHILS RELATIVE PERCENT: 4 % (ref 1–4)
ERYTHROCYTE [DISTWIDTH] IN BLOOD BY AUTOMATED COUNT: 13.2 % (ref 11.8–14.4)
GFR SERPL CREATININE-BSD FRML MDRD: >60 ML/MIN/1.73M2
GLUCOSE SERPL-MCNC: 97 MG/DL (ref 70–99)
HCT VFR BLD AUTO: 42.7 % (ref 36.3–47.1)
HGB BLD-MCNC: 14.5 G/DL (ref 11.9–15.1)
IMM GRANULOCYTES # BLD AUTO: 0.03 K/UL (ref 0–0.3)
IMM GRANULOCYTES NFR BLD: 1 %
LYMPHOCYTES NFR BLD: 1.43 K/UL (ref 1.1–3.7)
LYMPHOCYTES RELATIVE PERCENT: 24 % (ref 24–43)
MCH RBC QN AUTO: 31.4 PG (ref 25.2–33.5)
MCHC RBC AUTO-ENTMCNC: 34 G/DL (ref 28.4–34.8)
MCV RBC AUTO: 92.4 FL (ref 82.6–102.9)
MONOCYTES NFR BLD: 0.64 K/UL (ref 0.1–1.2)
MONOCYTES NFR BLD: 11 % (ref 3–12)
NEUTROPHILS NFR BLD: 59 % (ref 36–65)
NEUTS SEG NFR BLD: 3.58 K/UL (ref 1.5–8.1)
NRBC BLD-RTO: 0 PER 100 WBC
PLATELET # BLD AUTO: 299 K/UL (ref 138–453)
PMV BLD AUTO: 8.6 FL (ref 8.1–13.5)
POTASSIUM SERPL-SCNC: 4.4 MMOL/L (ref 3.7–5.3)
PROT SERPL-MCNC: 7.6 G/DL (ref 6.4–8.3)
RBC # BLD AUTO: 4.62 M/UL (ref 3.95–5.11)
SODIUM SERPL-SCNC: 138 MMOL/L (ref 135–144)
TSH SERPL DL<=0.05 MIU/L-ACNC: 1.44 UIU/ML (ref 0.3–5)
VIT B12 SERPL-MCNC: 770 PG/ML (ref 232–1245)
WBC OTHER # BLD: 6 K/UL (ref 3.5–11.3)

## 2023-08-17 RX ORDER — DEXLANSOPRAZOLE 60 MG/1
60 CAPSULE, DELAYED RELEASE ORAL DAILY
Qty: 90 CAPSULE | Refills: 3 | Status: SHIPPED | OUTPATIENT
Start: 2023-08-17 | End: 2024-08-16

## 2023-08-17 RX ORDER — AMITRIPTYLINE HYDROCHLORIDE 10 MG/1
10 TABLET, FILM COATED ORAL NIGHTLY
Qty: 30 TABLET | Refills: 5 | Status: SHIPPED | OUTPATIENT
Start: 2023-08-17

## 2023-08-17 RX ORDER — CLOBETASOL PROPIONATE 0.46 MG/ML
SOLUTION TOPICAL
COMMUNITY
Start: 2023-07-27 | End: 2024-08-17

## 2023-08-17 RX ORDER — CLOBETASOL PROPIONATE 0.05 G/100ML
SHAMPOO TOPICAL
COMMUNITY
Start: 2023-07-27 | End: 2024-08-17

## 2023-08-17 ASSESSMENT — ENCOUNTER SYMPTOMS
COLOR CHANGE: 0
STRIDOR: 0
EYE PAIN: 0
SINUS PRESSURE: 0
CHEST TIGHTNESS: 0
COUGH: 0
VOMITING: 0
NAUSEA: 0
SHORTNESS OF BREATH: 0
PHOTOPHOBIA: 0
CONSTIPATION: 0
RHINORRHEA: 0
ABDOMINAL PAIN: 0
BLOOD IN STOOL: 0
SORE THROAT: 0
EYE REDNESS: 0
DIARRHEA: 0
WHEEZING: 0
BACK PAIN: 1

## 2023-08-17 NOTE — PROGRESS NOTES
or rales. Abdominal:      General: Bowel sounds are normal.      Palpations: Abdomen is soft. There is no mass. Tenderness: There is no abdominal tenderness. There is no guarding. Musculoskeletal:        Hands:       Cervical back: Normal range of motion. Tenderness present. No spasms. Muscular tenderness present. Back:       Right knee: No deformity. Left knee: No deformity. Right lower leg: Normal. No edema. Left lower leg: Normal. No edema. Comments: Well healing scars on both knees   Lymphadenopathy:      Cervical: No cervical adenopathy. Skin:     General: Skin is warm and dry. Capillary Refill: Capillary refill takes less than 2 seconds. Findings: No erythema or rash. Neurological:      General: No focal deficit present. Mental Status: She is alert and oriented to person, place, and time. Cranial Nerves: No cranial nerve deficit. Coordination: Coordination normal.      Deep Tendon Reflexes: Reflexes are normal and symmetric. Psychiatric:         Attention and Perception: Attention normal.         Mood and Affect: Mood and affect normal. Mood is not anxious or depressed. Speech: Speech normal.         Behavior: Behavior normal.         Thought Content: Thought content normal.         Cognition and Memory: Cognition normal.                An electronic signature was used to authenticate this note.     --Aaron Gomez MD

## 2023-08-19 ENCOUNTER — PATIENT MESSAGE (OUTPATIENT)
Dept: FAMILY MEDICINE CLINIC | Age: 73
End: 2023-08-19

## 2023-08-21 DIAGNOSIS — G89.29 CHRONIC INTRACTABLE HEADACHE, UNSPECIFIED HEADACHE TYPE: ICD-10-CM

## 2023-08-21 DIAGNOSIS — R51.9 CHRONIC INTRACTABLE HEADACHE, UNSPECIFIED HEADACHE TYPE: ICD-10-CM

## 2023-08-21 NOTE — TELEPHONE ENCOUNTER
LOV 8-17-23   RTO 10-12-23  LRF 5-23-23          Controlled Substance Monitoring:    Acute and Chronic Pain Monitoring:   No flowsheet data found.

## 2023-08-21 NOTE — TELEPHONE ENCOUNTER
From: Bhavana Spence  To: Dr. Cain Six: 8/19/2023 11:35 AM EDT  Subject: Amitriptyline     Hi Dr. Haley Ospina. After 2 sleepless nights with tachycardia I have decided not to continue the Amitriptyline. If you have any other suggestions, please let me know. Thanks.    [facility]

## 2023-08-22 RX ORDER — ERENUMAB-AOOE 140 MG/ML
140 INJECTION, SOLUTION SUBCUTANEOUS
Qty: 1 ML | Refills: 2 | Status: SHIPPED | OUTPATIENT
Start: 2023-08-22 | End: 2024-08-22

## 2023-08-31 RX ORDER — DESVENLAFAXINE 100 MG/1
100 TABLET, EXTENDED RELEASE ORAL DAILY
Qty: 30 TABLET | Refills: 2 | Status: SHIPPED | OUTPATIENT
Start: 2023-08-31

## 2023-08-31 RX ORDER — TRAZODONE HYDROCHLORIDE 100 MG/1
300 TABLET ORAL NIGHTLY
Qty: 90 TABLET | Refills: 2 | Status: SHIPPED | OUTPATIENT
Start: 2023-08-31

## 2023-09-10 DIAGNOSIS — G47.9 SLEEP DISTURBANCE: ICD-10-CM

## 2023-09-10 DIAGNOSIS — J45.20 MILD INTERMITTENT ASTHMA WITHOUT COMPLICATION: ICD-10-CM

## 2023-09-10 DIAGNOSIS — E78.5 HYPERLIPIDEMIA, UNSPECIFIED HYPERLIPIDEMIA TYPE: ICD-10-CM

## 2023-09-11 NOTE — TELEPHONE ENCOUNTER
LOV 8-17-23   RTO 10-12-23  LRF 3-15-23          Controlled Substance Monitoring:    Acute and Chronic Pain Monitoring:        No data to display

## 2023-09-13 RX ORDER — EZETIMIBE 10 MG/1
10 TABLET ORAL DAILY
Qty: 90 TABLET | Refills: 1 | Status: SHIPPED | OUTPATIENT
Start: 2023-09-13 | End: 2024-09-13

## 2023-09-13 RX ORDER — BUDESONIDE AND FORMOTEROL FUMARATE DIHYDRATE 160; 4.5 UG/1; UG/1
2 AEROSOL RESPIRATORY (INHALATION) 2 TIMES DAILY
Qty: 30.6 G | Refills: 1 | Status: SHIPPED | OUTPATIENT
Start: 2023-09-13 | End: 2024-09-13

## 2023-09-13 RX ORDER — TRAZODONE HYDROCHLORIDE 100 MG/1
300 TABLET ORAL NIGHTLY
Qty: 270 TABLET | Refills: 1 | Status: SHIPPED | OUTPATIENT
Start: 2023-09-13

## 2023-10-12 ENCOUNTER — OFFICE VISIT (OUTPATIENT)
Dept: FAMILY MEDICINE CLINIC | Age: 73
End: 2023-10-12

## 2023-10-12 VITALS
HEART RATE: 75 BPM | TEMPERATURE: 96.9 F | BODY MASS INDEX: 29.76 KG/M2 | DIASTOLIC BLOOD PRESSURE: 78 MMHG | SYSTOLIC BLOOD PRESSURE: 122 MMHG | RESPIRATION RATE: 12 BRPM | WEIGHT: 168 LBS

## 2023-10-12 DIAGNOSIS — S51.852A DOG BITE OF LEFT FOREARM, INITIAL ENCOUNTER: ICD-10-CM
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
## 2023-10-12 DIAGNOSIS — F32.4 MAJOR DEPRESSIVE DISORDER WITH SINGLE EPISODE, IN PARTIAL REMISSION (HCC): Primary | ICD-10-CM

## 2023-10-12 DIAGNOSIS — G47.9 SLEEP DISTURBANCE: ICD-10-CM
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
## 2023-10-12 DIAGNOSIS — F41.9 ANXIETY: ICD-10-CM

## 2023-10-12 DIAGNOSIS — M79.89 SWELLING OF RIGHT RING FINGER: ICD-10-CM

## 2023-10-12 DIAGNOSIS — S61.451A CAT BITE OF RIGHT HAND, INITIAL ENCOUNTER: ICD-10-CM

## 2023-10-12 DIAGNOSIS — G43.809 OTHER MIGRAINE WITHOUT STATUS MIGRAINOSUS, NOT INTRACTABLE: ICD-10-CM

## 2023-10-12 RX ORDER — BUSPIRONE HYDROCHLORIDE 5 MG/1
5 TABLET ORAL 3 TIMES DAILY
Qty: 90 TABLET | Refills: 0 | Status: SHIPPED | OUTPATIENT
Start: 2023-10-12 | End: 2023-11-11

## 2023-10-12 RX ORDER — CEPHALEXIN 500 MG/1
500 CAPSULE ORAL 4 TIMES DAILY
Qty: 40 CAPSULE | Refills: 0 | Status: SHIPPED | OUTPATIENT
Start: 2023-10-12 | End: 2023-10-22

## 2023-10-12 NOTE — PROGRESS NOTES
and anxiety. She went on vacation and slept fine for 8-10 hours every night. She wonders if there is something that might control her anxiety a little better. She did sustain a cat bite to the right hand, a leash injury of the right ring finger and a contusion on the left forearm from a dog bite. These are all tender and somewhat swollen. She does not think they are getting worse. cmw       Review of Systems   Constitutional:  Positive for fatigue. Negative for appetite change, fever and unexpected weight change. HENT:  Negative for congestion, ear pain, hearing loss, postnasal drip, rhinorrhea, sinus pressure, sneezing, sore throat and tinnitus. Eyes:  Negative for photophobia, pain, redness and visual disturbance. Respiratory:  Negative for cough, chest tightness, shortness of breath, wheezing and stridor. Asthma normally well controlled with symbicort, singulair and albuterol. Cardiovascular:  Negative for chest pain, palpitations and leg swelling. Gastrointestinal:  Negative for abdominal pain, blood in stool, constipation, diarrhea, nausea and vomiting. Nausea and abdominal pain secondary to hiatal hernia, GERD and gastroparesis is well-controlled with dexilant, Nexium, reglan and zofran    Endocrine: Negative for polydipsia, polyphagia and polyuria. Genitourinary:  Negative for decreased urine volume, dysuria, frequency, hematuria and urgency. Musculoskeletal:  Positive for arthralgias (of the shoulders / knee pain s/p knee replacements -  much better / left thumb pain), back pain, joint swelling (right ring finger) and neck pain. Negative for myalgias. Skin:  Positive for wound (right hand / left forearm). Negative for color change and rash. Allergic/Immunologic: Negative for immunocompromised state. Neurological:  Positive for weakness (intermittently) and headaches (finally better after botox).  Negative for dizziness, tremors, seizures, light-headedness and

## 2023-10-21 ASSESSMENT — ENCOUNTER SYMPTOMS
SINUS PRESSURE: 0
EYE REDNESS: 0
STRIDOR: 0
VOMITING: 0
EYE PAIN: 0
BACK PAIN: 1
WHEEZING: 0
ABDOMINAL PAIN: 0
COUGH: 0
CHEST TIGHTNESS: 0
NAUSEA: 0
BLOOD IN STOOL: 0
SHORTNESS OF BREATH: 0
CONSTIPATION: 0
COLOR CHANGE: 0
PHOTOPHOBIA: 0
DIARRHEA: 0
RHINORRHEA: 0
SORE THROAT: 0

## 2023-11-08 DIAGNOSIS — F41.9 ANXIETY: ICD-10-CM

## 2023-11-08 RX ORDER — BUSPIRONE HYDROCHLORIDE 5 MG/1
5 TABLET ORAL 3 TIMES DAILY
Qty: 90 TABLET | Refills: 0 | Status: SHIPPED | OUTPATIENT
Start: 2023-11-08 | End: 2024-11-08

## 2023-11-08 NOTE — TELEPHONE ENCOUNTER
LOV 10-12-23   RTO 1-10-24  LRF 10-12-23          Controlled Substance Monitoring:    Acute and Chronic Pain Monitoring:        No data to display

## 2023-11-30 RX ORDER — DESVENLAFAXINE 100 MG/1
100 TABLET, EXTENDED RELEASE ORAL DAILY
Qty: 30 TABLET | Refills: 5 | Status: SHIPPED | OUTPATIENT
Start: 2023-11-30

## 2023-11-30 NOTE — TELEPHONE ENCOUNTER
LOV 10/12/2023   RTO 01/10/2024  LRF 08/31/2023          Controlled Substance Monitoring:    Acute and Chronic Pain Monitoring:        No data to display

## 2023-12-05 DIAGNOSIS — F41.9 ANXIETY: ICD-10-CM

## 2023-12-05 RX ORDER — BUSPIRONE HYDROCHLORIDE 5 MG/1
5 TABLET ORAL 3 TIMES DAILY
Qty: 90 TABLET | Refills: 0 | Status: SHIPPED | OUTPATIENT
Start: 2023-12-05

## 2023-12-05 NOTE — TELEPHONE ENCOUNTER
LOV 10/12/2023   RTO 1/10/2024  LRF 11/8/2023          Controlled Substance Monitoring:    Acute and Chronic Pain Monitoring:        No data to display

## 2024-01-04 DIAGNOSIS — F41.9 ANXIETY: ICD-10-CM

## 2024-01-04 RX ORDER — BUSPIRONE HYDROCHLORIDE 5 MG/1
5 TABLET ORAL 3 TIMES DAILY
Qty: 90 TABLET | Refills: 0 | Status: SHIPPED | OUTPATIENT
Start: 2024-01-04 | End: 2025-01-04

## 2024-01-04 NOTE — TELEPHONE ENCOUNTER
LOV 10-12-23   RTO 1-10-24  LRF 12-5-23          Controlled Substance Monitoring:    Acute and Chronic Pain Monitoring:        No data to display

## 2024-01-10 ENCOUNTER — OFFICE VISIT (OUTPATIENT)
Dept: FAMILY MEDICINE CLINIC | Age: 74
End: 2024-01-10
Payer: MEDICARE

## 2024-01-10 VITALS
WEIGHT: 165 LBS | TEMPERATURE: 97.7 F | BODY MASS INDEX: 29.23 KG/M2 | SYSTOLIC BLOOD PRESSURE: 118 MMHG | DIASTOLIC BLOOD PRESSURE: 72 MMHG | HEART RATE: 96 BPM

## 2024-01-10 DIAGNOSIS — G43.809 OTHER MIGRAINE WITHOUT STATUS MIGRAINOSUS, NOT INTRACTABLE: ICD-10-CM

## 2024-01-10 DIAGNOSIS — R53.82 CHRONIC FATIGUE: ICD-10-CM

## 2024-01-10 DIAGNOSIS — G47.9 SLEEP DISTURBANCE: ICD-10-CM

## 2024-01-10 DIAGNOSIS — D68.51 HOMOZYGOUS FACTOR V LEIDEN MUTATION (HCC): ICD-10-CM

## 2024-01-10 DIAGNOSIS — R53.83 OTHER FATIGUE: ICD-10-CM

## 2024-01-10 DIAGNOSIS — R51.9 CHRONIC INTRACTABLE HEADACHE, UNSPECIFIED HEADACHE TYPE: ICD-10-CM

## 2024-01-10 DIAGNOSIS — Z96.653 S/P TOTAL KNEE REPLACEMENT, BILATERAL: ICD-10-CM

## 2024-01-10 DIAGNOSIS — G89.4 CHRONIC PAIN SYNDROME: ICD-10-CM

## 2024-01-10 DIAGNOSIS — R01.1 HEART MURMUR: ICD-10-CM

## 2024-01-10 DIAGNOSIS — G89.29 CHRONIC INTRACTABLE HEADACHE, UNSPECIFIED HEADACHE TYPE: ICD-10-CM

## 2024-01-10 DIAGNOSIS — K21.9 GASTROESOPHAGEAL REFLUX DISEASE WITHOUT ESOPHAGITIS: ICD-10-CM

## 2024-01-10 DIAGNOSIS — M54.2 NECK PAIN: ICD-10-CM

## 2024-01-10 DIAGNOSIS — M17.0 PRIMARY OSTEOARTHRITIS OF BOTH KNEES: ICD-10-CM

## 2024-01-10 DIAGNOSIS — J30.2 SEASONAL ALLERGIES: ICD-10-CM

## 2024-01-10 DIAGNOSIS — M48.02 CERVICAL STENOSIS OF SPINE: ICD-10-CM

## 2024-01-10 DIAGNOSIS — M19.011 PRIMARY OSTEOARTHRITIS OF BOTH SHOULDERS: ICD-10-CM

## 2024-01-10 DIAGNOSIS — K31.84 GASTROPARESIS: ICD-10-CM

## 2024-01-10 DIAGNOSIS — J45.20 MILD INTERMITTENT ASTHMA WITHOUT COMPLICATION: ICD-10-CM

## 2024-01-10 DIAGNOSIS — E55.9 VITAMIN D DEFICIENCY, UNSPECIFIED: ICD-10-CM

## 2024-01-10 DIAGNOSIS — R73.01 IMPAIRED FASTING BLOOD SUGAR: ICD-10-CM

## 2024-01-10 DIAGNOSIS — Z86.19 HISTORY OF LYME DISEASE: ICD-10-CM

## 2024-01-10 DIAGNOSIS — Z86.61 HISTORY OF MENINGITIS: ICD-10-CM

## 2024-01-10 DIAGNOSIS — R11.0 NAUSEA: ICD-10-CM

## 2024-01-10 DIAGNOSIS — F32.4 MAJOR DEPRESSIVE DISORDER WITH SINGLE EPISODE, IN PARTIAL REMISSION (HCC): Primary | ICD-10-CM

## 2024-01-10 DIAGNOSIS — D69.1 DELTA STORAGE POOL DISEASE (HCC): ICD-10-CM

## 2024-01-10 DIAGNOSIS — M19.012 PRIMARY OSTEOARTHRITIS OF BOTH SHOULDERS: ICD-10-CM

## 2024-01-10 DIAGNOSIS — M15.9 GENERALIZED OSTEOARTHRITIS OF MULTIPLE SITES: ICD-10-CM

## 2024-01-10 DIAGNOSIS — K44.9 HIATAL HERNIA: ICD-10-CM

## 2024-01-10 DIAGNOSIS — E78.5 HYPERLIPIDEMIA, UNSPECIFIED HYPERLIPIDEMIA TYPE: ICD-10-CM

## 2024-01-10 DIAGNOSIS — F41.9 ANXIETY: ICD-10-CM

## 2024-01-10 PROCEDURE — 1090F PRES/ABSN URINE INCON ASSESS: CPT | Performed by: PEDIATRICS

## 2024-01-10 PROCEDURE — 3017F COLORECTAL CA SCREEN DOC REV: CPT | Performed by: PEDIATRICS

## 2024-01-10 PROCEDURE — G8484 FLU IMMUNIZE NO ADMIN: HCPCS | Performed by: PEDIATRICS

## 2024-01-10 PROCEDURE — G8417 CALC BMI ABV UP PARAM F/U: HCPCS | Performed by: PEDIATRICS

## 2024-01-10 PROCEDURE — 99214 OFFICE O/P EST MOD 30 MIN: CPT | Performed by: PEDIATRICS

## 2024-01-10 PROCEDURE — 1123F ACP DISCUSS/DSCN MKR DOCD: CPT | Performed by: PEDIATRICS

## 2024-01-10 PROCEDURE — G8400 PT W/DXA NO RESULTS DOC: HCPCS | Performed by: PEDIATRICS

## 2024-01-10 PROCEDURE — G8427 DOCREV CUR MEDS BY ELIG CLIN: HCPCS | Performed by: PEDIATRICS

## 2024-01-10 PROCEDURE — 1036F TOBACCO NON-USER: CPT | Performed by: PEDIATRICS

## 2024-01-10 RX ORDER — METHYLPREDNISOLONE 4 MG/1
TABLET ORAL
Qty: 1 KIT | Refills: 0 | Status: SHIPPED | OUTPATIENT
Start: 2024-01-10 | End: 2024-01-16

## 2024-01-10 ASSESSMENT — PATIENT HEALTH QUESTIONNAIRE - PHQ9
8. MOVING OR SPEAKING SO SLOWLY THAT OTHER PEOPLE COULD HAVE NOTICED. OR THE OPPOSITE, BEING SO FIGETY OR RESTLESS THAT YOU HAVE BEEN MOVING AROUND A LOT MORE THAN USUAL: 0
5. POOR APPETITE OR OVEREATING: 0
4. FEELING TIRED OR HAVING LITTLE ENERGY: 0
3. TROUBLE FALLING OR STAYING ASLEEP: 0
2. FEELING DOWN, DEPRESSED OR HOPELESS: 0
10. IF YOU CHECKED OFF ANY PROBLEMS, HOW DIFFICULT HAVE THESE PROBLEMS MADE IT FOR YOU TO DO YOUR WORK, TAKE CARE OF THINGS AT HOME, OR GET ALONG WITH OTHER PEOPLE: 0
SUM OF ALL RESPONSES TO PHQ QUESTIONS 1-9: 0
6. FEELING BAD ABOUT YOURSELF - OR THAT YOU ARE A FAILURE OR HAVE LET YOURSELF OR YOUR FAMILY DOWN: 0
7. TROUBLE CONCENTRATING ON THINGS, SUCH AS READING THE NEWSPAPER OR WATCHING TELEVISION: 0
SUM OF ALL RESPONSES TO PHQ9 QUESTIONS 1 & 2: 0
SUM OF ALL RESPONSES TO PHQ QUESTIONS 1-9: 0
9. THOUGHTS THAT YOU WOULD BE BETTER OFF DEAD, OR OF HURTING YOURSELF: 0
1. LITTLE INTEREST OR PLEASURE IN DOING THINGS: 0
SUM OF ALL RESPONSES TO PHQ QUESTIONS 1-9: 0
SUM OF ALL RESPONSES TO PHQ QUESTIONS 1-9: 0

## 2024-01-10 NOTE — PROGRESS NOTES
polydipsia, polyphagia and polyuria.   Genitourinary:  Negative for decreased urine volume, dysuria, frequency, hematuria and urgency.   Musculoskeletal:  Positive for arthralgias (of the shoulders / knee pain s/p knee replacements -  much better / left thumb pain), back pain, joint swelling (right ring finger) and neck pain. Negative for myalgias.   Skin:  Positive for wound (right hand / left forearm). Negative for color change and rash.   Allergic/Immunologic: Negative for immunocompromised state.   Neurological:  Positive for weakness (intermittently) and headaches (finally better after botox). Negative for dizziness, tremors, seizures, light-headedness and numbness.   Hematological:  Negative for adenopathy. Does not bruise/bleed easily.   Psychiatric/Behavioral:  Positive for dysphoric mood (better controlled with increased pristiq) and sleep disturbance (better with increased trazodone - still not good but better). The patient is nervous/anxious (more anxiety).           Objective     Physical Exam  Vitals and nursing note reviewed.   Constitutional:       General: She is not in acute distress.     Appearance: Normal appearance. She is well-developed and normal weight. She is not ill-appearing, toxic-appearing or diaphoretic.   HENT:      Head: Normocephalic and atraumatic.      Right Ear: Tympanic membrane, ear canal and external ear normal.      Left Ear: Tympanic membrane, ear canal and external ear normal.      Nose: Mucosal edema present. No congestion or rhinorrhea.      Mouth/Throat:      Lips: Pink.      Mouth: Mucous membranes are moist.      Pharynx: Oropharynx is clear. No pharyngeal swelling, oropharyngeal exudate or posterior oropharyngeal erythema.   Eyes:      General: No scleral icterus.        Right eye: No discharge.         Left eye: No discharge.      Pupils: Pupils are equal, round, and reactive to light.   Neck:      Thyroid: No thyromegaly.      Vascular: No JVD.     Cardiovascular:

## 2024-01-16 ASSESSMENT — ENCOUNTER SYMPTOMS
SORE THROAT: 0
ABDOMINAL PAIN: 0
SHORTNESS OF BREATH: 0
NAUSEA: 0
WHEEZING: 0
DIARRHEA: 0
PHOTOPHOBIA: 0
SINUS PRESSURE: 0
CONSTIPATION: 0
CHEST TIGHTNESS: 0
COUGH: 0
VOMITING: 0
BLOOD IN STOOL: 0
EYE PAIN: 0
BACK PAIN: 1
RHINORRHEA: 0
COLOR CHANGE: 0
EYE REDNESS: 0
STRIDOR: 0

## 2024-01-23 DIAGNOSIS — K31.84 GASTROPARESIS: ICD-10-CM

## 2024-01-24 RX ORDER — METOCLOPRAMIDE 10 MG/1
TABLET ORAL
Qty: 270 TABLET | Refills: 1 | Status: SHIPPED | OUTPATIENT
Start: 2024-01-24

## 2024-01-24 NOTE — TELEPHONE ENCOUNTER
LOV 1/10/24   RTO 2/12/24  LRF 5/23/23          Controlled Substance Monitoring:    Acute and Chronic Pain Monitoring:        No data to display

## 2024-02-05 DIAGNOSIS — F41.9 ANXIETY: ICD-10-CM

## 2024-02-05 RX ORDER — BUSPIRONE HYDROCHLORIDE 5 MG/1
5 TABLET ORAL 3 TIMES DAILY
Qty: 90 TABLET | Refills: 2 | Status: SHIPPED | OUTPATIENT
Start: 2024-02-05 | End: 2025-02-05

## 2024-02-05 NOTE — TELEPHONE ENCOUNTER
LOV 1-10-24  RTO 2-12-24  LRF 1-10-24          Controlled Substance Monitoring:    Acute and Chronic Pain Monitoring:        No data to display

## 2024-02-06 ENCOUNTER — HOSPITAL ENCOUNTER (OUTPATIENT)
Dept: GENERAL RADIOLOGY | Facility: CLINIC | Age: 74
Discharge: HOME | End: 2024-02-08
Payer: MEDICARE

## 2024-02-06 ENCOUNTER — HOSPITAL ENCOUNTER (OUTPATIENT)
Facility: CLINIC | Age: 74
Discharge: HOME | End: 2024-02-08
Payer: MEDICARE

## 2024-02-06 DIAGNOSIS — R05.1 ACUTE COUGH: ICD-10-CM

## 2024-02-06 PROCEDURE — 71046 X-RAY EXAM CHEST 2 VIEWS: CPT

## 2024-02-08 DIAGNOSIS — J45.20 MILD INTERMITTENT ASTHMA WITHOUT COMPLICATION: ICD-10-CM

## 2024-02-08 DIAGNOSIS — J45.21 MILD INTERMITTENT ASTHMA WITH ACUTE EXACERBATION: ICD-10-CM

## 2024-02-08 NOTE — TELEPHONE ENCOUNTER
*Pended Medrol dose pack in this encounter as well.     LOV 10/10/2024   RTO 02/12/2024  LRF 08/30/2022          Controlled Substance Monitoring:    Acute and Chronic Pain Monitoring:        No data to display

## 2024-02-09 RX ORDER — ALBUTEROL SULFATE 90 UG/1
2 AEROSOL, METERED RESPIRATORY (INHALATION) EVERY 4 HOURS PRN
Qty: 1 EACH | Refills: 2 | Status: SHIPPED | OUTPATIENT
Start: 2024-02-09 | End: 2025-02-08

## 2024-02-09 RX ORDER — METHYLPREDNISOLONE 4 MG/1
TABLET ORAL
Qty: 1 KIT | Refills: 0 | Status: SHIPPED | OUTPATIENT
Start: 2024-02-09 | End: 2024-02-14

## 2024-02-12 ENCOUNTER — OFFICE VISIT (OUTPATIENT)
Dept: FAMILY MEDICINE CLINIC | Age: 74
End: 2024-02-12
Payer: MEDICARE

## 2024-02-12 VITALS
HEIGHT: 63 IN | OXYGEN SATURATION: 98 % | BODY MASS INDEX: 29.34 KG/M2 | TEMPERATURE: 98 F | WEIGHT: 165.6 LBS | SYSTOLIC BLOOD PRESSURE: 118 MMHG | HEART RATE: 102 BPM | DIASTOLIC BLOOD PRESSURE: 64 MMHG

## 2024-02-12 DIAGNOSIS — R11.0 NAUSEA: ICD-10-CM

## 2024-02-12 DIAGNOSIS — D69.1 DELTA STORAGE POOL DISEASE (HCC): ICD-10-CM

## 2024-02-12 DIAGNOSIS — J45.21 MILD INTERMITTENT ASTHMA WITH ACUTE EXACERBATION: ICD-10-CM

## 2024-02-12 DIAGNOSIS — F41.9 ANXIETY: ICD-10-CM

## 2024-02-12 DIAGNOSIS — M17.0 PRIMARY OSTEOARTHRITIS OF BOTH KNEES: ICD-10-CM

## 2024-02-12 DIAGNOSIS — Z00.00 MEDICARE ANNUAL WELLNESS VISIT, SUBSEQUENT: Primary | ICD-10-CM

## 2024-02-12 DIAGNOSIS — M54.2 NECK PAIN: ICD-10-CM

## 2024-02-12 DIAGNOSIS — R73.01 IMPAIRED FASTING BLOOD SUGAR: ICD-10-CM

## 2024-02-12 DIAGNOSIS — K31.84 GASTROPARESIS: ICD-10-CM

## 2024-02-12 DIAGNOSIS — E55.9 VITAMIN D DEFICIENCY, UNSPECIFIED: ICD-10-CM

## 2024-02-12 DIAGNOSIS — Z96.653 S/P TOTAL KNEE REPLACEMENT, BILATERAL: ICD-10-CM

## 2024-02-12 DIAGNOSIS — G43.809 OTHER MIGRAINE WITHOUT STATUS MIGRAINOSUS, NOT INTRACTABLE: ICD-10-CM

## 2024-02-12 DIAGNOSIS — D12.6 TUBULAR ADENOMA OF COLON: ICD-10-CM

## 2024-02-12 DIAGNOSIS — R01.1 HEART MURMUR: ICD-10-CM

## 2024-02-12 DIAGNOSIS — M19.012 PRIMARY OSTEOARTHRITIS OF BOTH SHOULDERS: ICD-10-CM

## 2024-02-12 DIAGNOSIS — Z12.11 SCREENING FOR COLORECTAL CANCER: ICD-10-CM

## 2024-02-12 DIAGNOSIS — M15.9 GENERALIZED OSTEOARTHRITIS OF MULTIPLE SITES: ICD-10-CM

## 2024-02-12 DIAGNOSIS — M19.011 PRIMARY OSTEOARTHRITIS OF BOTH SHOULDERS: ICD-10-CM

## 2024-02-12 DIAGNOSIS — Z12.12 SCREENING FOR COLORECTAL CANCER: ICD-10-CM

## 2024-02-12 DIAGNOSIS — J45.20 MILD INTERMITTENT ASTHMA WITHOUT COMPLICATION: ICD-10-CM

## 2024-02-12 DIAGNOSIS — F32.4 MAJOR DEPRESSIVE DISORDER WITH SINGLE EPISODE, IN PARTIAL REMISSION (HCC): ICD-10-CM

## 2024-02-12 DIAGNOSIS — Z86.19 HISTORY OF LYME DISEASE: ICD-10-CM

## 2024-02-12 DIAGNOSIS — R51.9 CHRONIC INTRACTABLE HEADACHE, UNSPECIFIED HEADACHE TYPE: ICD-10-CM

## 2024-02-12 DIAGNOSIS — J30.2 SEASONAL ALLERGIES: ICD-10-CM

## 2024-02-12 DIAGNOSIS — K21.9 GASTROESOPHAGEAL REFLUX DISEASE WITHOUT ESOPHAGITIS: ICD-10-CM

## 2024-02-12 DIAGNOSIS — R53.82 CHRONIC FATIGUE: ICD-10-CM

## 2024-02-12 DIAGNOSIS — M48.02 CERVICAL STENOSIS OF SPINE: ICD-10-CM

## 2024-02-12 DIAGNOSIS — D68.51 HOMOZYGOUS FACTOR V LEIDEN MUTATION (HCC): ICD-10-CM

## 2024-02-12 DIAGNOSIS — E78.5 HYPERLIPIDEMIA, UNSPECIFIED HYPERLIPIDEMIA TYPE: ICD-10-CM

## 2024-02-12 DIAGNOSIS — G47.9 SLEEP DISTURBANCE: ICD-10-CM

## 2024-02-12 DIAGNOSIS — G89.4 CHRONIC PAIN SYNDROME: ICD-10-CM

## 2024-02-12 DIAGNOSIS — G89.29 CHRONIC INTRACTABLE HEADACHE, UNSPECIFIED HEADACHE TYPE: ICD-10-CM

## 2024-02-12 DIAGNOSIS — Z86.61 HISTORY OF MENINGITIS: ICD-10-CM

## 2024-02-12 DIAGNOSIS — K44.9 HIATAL HERNIA: ICD-10-CM

## 2024-02-12 PROCEDURE — G8417 CALC BMI ABV UP PARAM F/U: HCPCS | Performed by: PEDIATRICS

## 2024-02-12 PROCEDURE — 99214 OFFICE O/P EST MOD 30 MIN: CPT | Performed by: PEDIATRICS

## 2024-02-12 PROCEDURE — 1036F TOBACCO NON-USER: CPT | Performed by: PEDIATRICS

## 2024-02-12 PROCEDURE — 1090F PRES/ABSN URINE INCON ASSESS: CPT | Performed by: PEDIATRICS

## 2024-02-12 PROCEDURE — G8427 DOCREV CUR MEDS BY ELIG CLIN: HCPCS | Performed by: PEDIATRICS

## 2024-02-12 PROCEDURE — G8400 PT W/DXA NO RESULTS DOC: HCPCS | Performed by: PEDIATRICS

## 2024-02-12 PROCEDURE — 1123F ACP DISCUSS/DSCN MKR DOCD: CPT | Performed by: PEDIATRICS

## 2024-02-12 PROCEDURE — G0439 PPPS, SUBSEQ VISIT: HCPCS | Performed by: PEDIATRICS

## 2024-02-12 PROCEDURE — 3017F COLORECTAL CA SCREEN DOC REV: CPT | Performed by: PEDIATRICS

## 2024-02-12 PROCEDURE — G8484 FLU IMMUNIZE NO ADMIN: HCPCS | Performed by: PEDIATRICS

## 2024-02-12 NOTE — PROGRESS NOTES
Medicare Annual Wellness Visit    Toshia Alcala is here for Medicare AWV    Assessment & Plan     Medicare annual wellness visit, subsequent      Recommendations for Preventive Services Due: see orders and patient instructions/AVS.  Recommended screening schedule for the next 5-10 years is provided to the patient in written form: see Patient Instructions/AVS.     Return in 1 year (on 2/12/2025).         Subjective       Patient's complete Health Risk Assessment and screening values have been reviewed and are found in Flowsheets. The following problems were reviewed today and where indicated follow up appointments were made and/or referrals ordered.    Positive Risk Factor Screenings with Interventions:                Activity, Diet, and Weight:  On average, how many days per week do you engage in moderate to strenuous exercise (like a brisk walk)?: 2 days  On average, how many minutes do you engage in exercise at this level?: 20 min    Do you eat balanced/healthy meals regularly?: (!) No    Body mass index is 29.33 kg/m².    Do you eat balanced/healthy meals regularly Interventions:  Patient counseled on eating balanced / healthy diet and regular exercise to promote a healthy BMI                            Objective   Vitals:    02/12/24 0932   BP: 118/64   Site: Left Upper Arm   Position: Sitting   Cuff Size: Medium Adult   Pulse: (!) 102   Temp: 98 °F (36.7 °C)   SpO2: 98%   Weight: 75.1 kg (165 lb 9.6 oz)   Height: 1.6 m (5' 3\")      Body mass index is 29.33 kg/m².             Allergies   Allergen Reactions    Latex     Adhesive Tape Itching     Swelling and rash    Ambien [Zolpidem Tartrate]      Feels crazy    Statins Other (See Comments)     Muscle pain    Ciprofloxacin Nausea And Vomiting    Influenza Vaccines Nausea And Vomiting    Other Nausea And Vomiting     All Narcotic pain medications     Prozac [Fluoxetine Hcl] Nausea And Vomiting     Prior to Visit Medications    Medication Sig Taking? Authorizing

## 2024-02-12 NOTE — PATIENT INSTRUCTIONS
Updated letters and mailed them to home.    and breads, oatmeal, beans, brown rice, citrus fruits, and apples.     Eat lean proteins. Heart-healthy proteins include seafood, lean meats and poultry, eggs, beans, peas, nuts, seeds, and soy products.     Limit drinks and foods with added sugar. These include candy, desserts, and soda pop.   Lifestyle changes    If your doctor recommends it, get more exercise. Walking is a good choice. Bit by bit, increase the amount you walk every day. Try for at least 30 minutes on most days of the week. You also may want to swim, bike, or do other activities.     Do not smoke. If you need help quitting, talk to your doctor about stop-smoking programs and medicines. These can increase your chances of quitting for good. Quitting smoking may be the most important step you can take to protect your heart. It is never too late to quit.     Limit alcohol to 2 drinks a day for men and 1 drink a day for women. Too much alcohol can cause health problems.     Manage other health problems such as diabetes, high blood pressure, and high cholesterol. If you think you may have a problem with alcohol or drug use, talk to your doctor.   Medicines    Take your medicines exactly as prescribed. Call your doctor if you think you are having a problem with your medicine.     If your doctor recommends aspirin, take the amount directed each day. Make sure you take aspirin and not another kind of pain reliever, such as acetaminophen (Tylenol).   When should you call for help?   Call 911 if you have symptoms of a heart attack. These may include:    Chest pain or pressure, or a strange feeling in the chest.     Sweating.     Shortness of breath.     Pain, pressure, or a strange feeling in the back, neck, jaw, or upper belly or in one or both shoulders or arms.     Lightheadedness or sudden weakness.     A fast or irregular heartbeat.   After you call 911, the  may tell you to chew 1 adult-strength or 2 to 4 low-dose aspirin. Wait for an

## 2024-02-12 NOTE — PROGRESS NOTES
Toshia Alcala (:  1950) is a 73 y.o. female,Established patient, here for evaluation of the following chief complaint(s):    Medicare AWV, Depression, Anxiety, Insomnia, Asthma, Cholesterol Problem, and Chronic Pain         ASSESSMENT/PLAN:    1. Medicare annual wellness visit, subsequent  2. Major depressive disorder with single episode, in partial remission (HCC)  3. Anxiety  4. Sleep disturbance  5. Chronic fatigue  -     TSH; Future  6. Mild intermittent asthma without complication  -     fluticasone furoate-vilanterol (BREO ELLIPTA) 200-25 MCG/ACT AEPB inhaler; Inhale 1 puff into the lungs daily, Disp-1 each, R-2Normal  7. Mild intermittent asthma with acute exacerbation  -     fluticasone furoate-vilanterol (BREO ELLIPTA) 200-25 MCG/ACT AEPB inhaler; Inhale 1 puff into the lungs daily, Disp-1 each, R-2Normal  8. Seasonal allergies  -     Vitamin D 25 Hydroxy; Future  9. Gastroparesis  -     External Referral To Gastroenterology  10. Nausea  -     External Referral To Gastroenterology  11. Gastroesophageal reflux disease without esophagitis  -     External Referral To Gastroenterology  12. Hiatal hernia  -     External Referral To Gastroenterology  13. Chronic intractable headache, unspecified headache type  14. Other migraine without status migrainosus, not intractable  -     rizatriptan (MAXALT-MLT) 10 MG disintegrating tablet; Take 1 tablet by mouth daily as needed for Migraine May repeat in 2 hours if needed, Disp-18 tablet, R-2Normal  15. History of meningitis  16. History of Lyme disease  17. Chronic pain syndrome  18. Neck pain  19. Cervical stenosis of spine  20. Heart murmur  21. Hyperlipidemia, unspecified hyperlipidemia type  -     Lipid Panel; Future  -     Comprehensive Metabolic Panel; Future  -     CBC; Future  22. Impaired fasting blood sugar  -     Comprehensive Metabolic Panel; Future  -     Hemoglobin A1C; Future  23. Generalized osteoarthritis of multiple sites  24. Primary

## 2024-02-13 RX ORDER — FLUTICASONE FUROATE AND VILANTEROL 200; 25 UG/1; UG/1
1 POWDER RESPIRATORY (INHALATION) DAILY
Qty: 1 EACH | Refills: 2 | Status: SHIPPED | OUTPATIENT
Start: 2024-02-13

## 2024-02-13 RX ORDER — RIZATRIPTAN BENZOATE 10 MG/1
10 TABLET, ORALLY DISINTEGRATING ORAL DAILY PRN
Qty: 18 TABLET | Refills: 2 | Status: SHIPPED | OUTPATIENT
Start: 2024-02-13

## 2024-02-14 ENCOUNTER — TELEPHONE (OUTPATIENT)
Dept: FAMILY MEDICINE CLINIC | Age: 74
End: 2024-02-14

## 2024-02-20 DIAGNOSIS — J45.20 MILD INTERMITTENT ASTHMA WITHOUT COMPLICATION: ICD-10-CM

## 2024-02-21 RX ORDER — MONTELUKAST SODIUM 10 MG/1
10 TABLET ORAL NIGHTLY
Qty: 90 TABLET | Refills: 3 | Status: SHIPPED | OUTPATIENT
Start: 2024-02-21

## 2024-02-21 NOTE — TELEPHONE ENCOUNTER
LOV 2/12/24   RTO 5/13/24  LRF 2/8/23          Controlled Substance Monitoring:    Acute and Chronic Pain Monitoring:        No data to display

## 2024-02-29 ENCOUNTER — E-VISIT (OUTPATIENT)
Dept: FAMILY MEDICINE CLINIC | Age: 74
End: 2024-02-29
Payer: MEDICARE

## 2024-02-29 DIAGNOSIS — U07.1 COVID-19: Primary | ICD-10-CM

## 2024-02-29 PROCEDURE — 99421 OL DIG E/M SVC 5-10 MIN: CPT | Performed by: PEDIATRICS

## 2024-02-29 RX ORDER — AZITHROMYCIN 250 MG/1
TABLET, FILM COATED ORAL
Qty: 6 TABLET | Refills: 0 | Status: SHIPPED | OUTPATIENT
Start: 2024-02-29 | End: 2024-03-10

## 2024-02-29 RX ORDER — PREDNISONE 20 MG/1
20 TABLET ORAL 2 TIMES DAILY
Qty: 20 TABLET | Refills: 0 | Status: SHIPPED | OUTPATIENT
Start: 2024-02-29 | End: 2024-03-10

## 2024-02-29 ASSESSMENT — LIFESTYLE VARIABLES: SMOKING_STATUS: NO, I HAVE NEVER SMOKED

## 2024-03-14 DIAGNOSIS — M19.012 PRIMARY OSTEOARTHRITIS OF BOTH SHOULDERS: ICD-10-CM

## 2024-03-14 DIAGNOSIS — M17.0 PRIMARY OSTEOARTHRITIS OF BOTH KNEES: ICD-10-CM

## 2024-03-14 DIAGNOSIS — M19.011 PRIMARY OSTEOARTHRITIS OF BOTH SHOULDERS: ICD-10-CM

## 2024-03-16 RX ORDER — CELECOXIB 200 MG/1
200 CAPSULE ORAL DAILY
Qty: 90 CAPSULE | Refills: 3 | Status: SHIPPED | OUTPATIENT
Start: 2024-03-16

## 2024-04-08 DIAGNOSIS — E78.5 HYPERLIPIDEMIA, UNSPECIFIED HYPERLIPIDEMIA TYPE: ICD-10-CM

## 2024-04-09 RX ORDER — EZETIMIBE 10 MG/1
10 TABLET ORAL DAILY
Qty: 90 TABLET | Refills: 1 | Status: SHIPPED | OUTPATIENT
Start: 2024-04-09 | End: 2025-04-10

## 2024-04-09 NOTE — TELEPHONE ENCOUNTER
LOV 2/12/2024   RTO 3 months, 1 year for AWV  LRF 9/13/2023          Controlled Substance Monitoring:    Acute and Chronic Pain Monitoring:        No data to display

## 2024-04-18 DIAGNOSIS — R11.0 NAUSEA: ICD-10-CM

## 2024-04-18 RX ORDER — ONDANSETRON 4 MG/1
4 TABLET, FILM COATED ORAL EVERY 8 HOURS PRN
Qty: 270 TABLET | Refills: 0 | Status: SHIPPED | OUTPATIENT
Start: 2024-04-18

## 2024-04-18 NOTE — TELEPHONE ENCOUNTER
LOV 2/12/24   RTO 5/13/24  LRF 6/14/23          Controlled Substance Monitoring:    Acute and Chronic Pain Monitoring:        No data to display

## 2024-05-02 DIAGNOSIS — F41.9 ANXIETY: ICD-10-CM

## 2024-05-02 RX ORDER — BUSPIRONE HYDROCHLORIDE 5 MG/1
5 TABLET ORAL 3 TIMES DAILY
Qty: 90 TABLET | Refills: 2 | Status: SHIPPED | OUTPATIENT
Start: 2024-05-02

## 2024-05-02 NOTE — TELEPHONE ENCOUNTER
LOV 2/12/24   RTO 5/13/24  LRF 2/5/24          Controlled Substance Monitoring:    Acute and Chronic Pain Monitoring:        No data to display

## 2024-05-15 DIAGNOSIS — J45.20 MILD INTERMITTENT ASTHMA WITHOUT COMPLICATION: ICD-10-CM

## 2024-05-15 DIAGNOSIS — J45.21 MILD INTERMITTENT ASTHMA WITH ACUTE EXACERBATION: ICD-10-CM

## 2024-05-15 RX ORDER — FLUTICASONE FUROATE AND VILANTEROL 200; 25 UG/1; UG/1
1 POWDER RESPIRATORY (INHALATION) DAILY
Qty: 1 EACH | Refills: 5 | Status: SHIPPED | OUTPATIENT
Start: 2024-05-15

## 2024-05-15 NOTE — TELEPHONE ENCOUNTER
LOV 2/12/24 awv   RTO 6/27/24  LRF 2/13/24          Controlled Substance Monitoring:    Acute and Chronic Pain Monitoring:        No data to display

## 2024-05-16 DIAGNOSIS — R51.9 CHRONIC INTRACTABLE HEADACHE, UNSPECIFIED HEADACHE TYPE: ICD-10-CM

## 2024-05-16 DIAGNOSIS — G89.29 CHRONIC INTRACTABLE HEADACHE, UNSPECIFIED HEADACHE TYPE: ICD-10-CM

## 2024-05-16 RX ORDER — ERENUMAB-AOOE 140 MG/ML
140 INJECTION, SOLUTION SUBCUTANEOUS
Qty: 1 ML | Refills: 2 | Status: SHIPPED | OUTPATIENT
Start: 2024-05-16 | End: 2025-05-17

## 2024-05-16 NOTE — TELEPHONE ENCOUNTER
Patient called requesting a rx for Aimovig. Patient states she was getting samples from previous neurologist Dr. Jean. Last injection was around 6 weeks ago. Patient has an new patient appointment with new neurologist on 5/22. Prior Auth from last year ended last month, medication might need a new prior auth. Medication pended for provider.

## 2024-05-21 DIAGNOSIS — G47.9 SLEEP DISTURBANCE: ICD-10-CM

## 2024-05-21 RX ORDER — TRAZODONE HYDROCHLORIDE 100 MG/1
100 TABLET ORAL NIGHTLY
Qty: 270 TABLET | Refills: 1 | Status: SHIPPED | OUTPATIENT
Start: 2024-05-21 | End: 2025-05-21

## 2024-05-21 NOTE — TELEPHONE ENCOUNTER
LOV 2/12/24   RTO 6/27/24  LRF 9/13/23          Controlled Substance Monitoring:    Acute and Chronic Pain Monitoring:        No data to display

## 2024-05-25 DIAGNOSIS — G47.9 SLEEP DISTURBANCE: ICD-10-CM

## 2024-05-28 RX ORDER — TRAZODONE HYDROCHLORIDE 100 MG/1
TABLET ORAL
Qty: 270 TABLET | Refills: 1 | OUTPATIENT
Start: 2024-05-28

## 2024-05-29 NOTE — TELEPHONE ENCOUNTER
LOV 2/29/24   RTO 6/27/24  LRF 11/30/23          Controlled Substance Monitoring:    Acute and Chronic Pain Monitoring:        No data to display

## 2024-05-31 RX ORDER — DESVENLAFAXINE 100 MG/1
100 TABLET, EXTENDED RELEASE ORAL DAILY
Qty: 30 TABLET | Refills: 5 | Status: SHIPPED | OUTPATIENT
Start: 2024-05-31

## 2024-06-27 ENCOUNTER — OFFICE VISIT (OUTPATIENT)
Dept: FAMILY MEDICINE CLINIC | Age: 74
End: 2024-06-27

## 2024-06-27 VITALS
HEART RATE: 75 BPM | WEIGHT: 166 LBS | SYSTOLIC BLOOD PRESSURE: 122 MMHG | TEMPERATURE: 98 F | HEIGHT: 63 IN | BODY MASS INDEX: 29.41 KG/M2 | DIASTOLIC BLOOD PRESSURE: 86 MMHG | OXYGEN SATURATION: 95 %

## 2024-06-27 DIAGNOSIS — M48.02 CERVICAL STENOSIS OF SPINE: ICD-10-CM

## 2024-06-27 DIAGNOSIS — G47.9 SLEEP DISTURBANCE: ICD-10-CM

## 2024-06-27 DIAGNOSIS — D69.1 DELTA STORAGE POOL DISEASE (HCC): ICD-10-CM

## 2024-06-27 DIAGNOSIS — L30.9 ECZEMA, UNSPECIFIED TYPE: ICD-10-CM

## 2024-06-27 DIAGNOSIS — R51.9 CHRONIC INTRACTABLE HEADACHE, UNSPECIFIED HEADACHE TYPE: ICD-10-CM

## 2024-06-27 DIAGNOSIS — G89.4 CHRONIC PAIN SYNDROME: ICD-10-CM

## 2024-06-27 DIAGNOSIS — L82.0 SEBORRHEIC KERATOSES, INFLAMED: ICD-10-CM

## 2024-06-27 DIAGNOSIS — K44.9 HIATAL HERNIA: ICD-10-CM

## 2024-06-27 DIAGNOSIS — M19.012 PRIMARY OSTEOARTHRITIS OF BOTH SHOULDERS: ICD-10-CM

## 2024-06-27 DIAGNOSIS — R01.1 HEART MURMUR: ICD-10-CM

## 2024-06-27 DIAGNOSIS — K21.9 GASTROESOPHAGEAL REFLUX DISEASE WITHOUT ESOPHAGITIS: ICD-10-CM

## 2024-06-27 DIAGNOSIS — M54.2 NECK PAIN: ICD-10-CM

## 2024-06-27 DIAGNOSIS — Z86.61 HISTORY OF MENINGITIS: ICD-10-CM

## 2024-06-27 DIAGNOSIS — J30.2 SEASONAL ALLERGIES: ICD-10-CM

## 2024-06-27 DIAGNOSIS — J45.20 MILD INTERMITTENT ASTHMA WITHOUT COMPLICATION: ICD-10-CM

## 2024-06-27 DIAGNOSIS — F41.9 ANXIETY: ICD-10-CM

## 2024-06-27 DIAGNOSIS — Z13.820 SCREENING FOR OSTEOPOROSIS: ICD-10-CM

## 2024-06-27 DIAGNOSIS — Z78.0 POST-MENOPAUSAL: ICD-10-CM

## 2024-06-27 DIAGNOSIS — Z96.653 S/P TOTAL KNEE REPLACEMENT, BILATERAL: ICD-10-CM

## 2024-06-27 DIAGNOSIS — R11.0 NAUSEA: ICD-10-CM

## 2024-06-27 DIAGNOSIS — D68.51 HOMOZYGOUS FACTOR V LEIDEN MUTATION (HCC): ICD-10-CM

## 2024-06-27 DIAGNOSIS — R53.82 CHRONIC FATIGUE: ICD-10-CM

## 2024-06-27 DIAGNOSIS — G89.29 CHRONIC INTRACTABLE HEADACHE, UNSPECIFIED HEADACHE TYPE: ICD-10-CM

## 2024-06-27 DIAGNOSIS — D12.6 TUBULAR ADENOMA OF COLON: ICD-10-CM

## 2024-06-27 DIAGNOSIS — R73.01 IMPAIRED FASTING BLOOD SUGAR: ICD-10-CM

## 2024-06-27 DIAGNOSIS — M15.9 GENERALIZED OSTEOARTHRITIS OF MULTIPLE SITES: ICD-10-CM

## 2024-06-27 DIAGNOSIS — M17.0 PRIMARY OSTEOARTHRITIS OF BOTH KNEES: ICD-10-CM

## 2024-06-27 DIAGNOSIS — E55.9 VITAMIN D DEFICIENCY, UNSPECIFIED: ICD-10-CM

## 2024-06-27 DIAGNOSIS — G43.809 OTHER MIGRAINE WITHOUT STATUS MIGRAINOSUS, NOT INTRACTABLE: ICD-10-CM

## 2024-06-27 DIAGNOSIS — K31.84 GASTROPARESIS: ICD-10-CM

## 2024-06-27 DIAGNOSIS — M19.011 PRIMARY OSTEOARTHRITIS OF BOTH SHOULDERS: ICD-10-CM

## 2024-06-27 DIAGNOSIS — Z86.19 HISTORY OF LYME DISEASE: ICD-10-CM

## 2024-06-27 DIAGNOSIS — E78.5 HYPERLIPIDEMIA, UNSPECIFIED HYPERLIPIDEMIA TYPE: ICD-10-CM

## 2024-06-27 DIAGNOSIS — Z12.31 ENCOUNTER FOR SCREENING MAMMOGRAM FOR MALIGNANT NEOPLASM OF BREAST: ICD-10-CM

## 2024-06-27 DIAGNOSIS — F32.4 MAJOR DEPRESSIVE DISORDER WITH SINGLE EPISODE, IN PARTIAL REMISSION (HCC): Primary | ICD-10-CM

## 2024-06-27 RX ORDER — NAPROXEN SODIUM 550 MG/1
TABLET ORAL
COMMUNITY
Start: 2024-06-18

## 2024-06-27 RX ORDER — ATOGEPANT 60 MG/1
TABLET ORAL
COMMUNITY
Start: 2024-05-23

## 2024-06-27 RX ORDER — TIZANIDINE 2 MG/1
TABLET ORAL
COMMUNITY
Start: 2024-06-18

## 2024-06-27 RX ORDER — TRIAMCINOLONE ACETONIDE 1 MG/G
OINTMENT TOPICAL 2 TIMES DAILY
Qty: 80 G | Refills: 2 | Status: SHIPPED | OUTPATIENT
Start: 2024-06-27 | End: 2024-07-04

## 2024-06-27 NOTE — PROGRESS NOTES
are equal, round, and reactive to light.   Neck:      Thyroid: No thyromegaly.      Vascular: No JVD.     Cardiovascular:      Rate and Rhythm: Normal rate and regular rhythm.      Pulses: Normal pulses.      Heart sounds: Murmur heard.   Pulmonary:      Effort: Pulmonary effort is normal. No respiratory distress.      Breath sounds: No transmitted upper airway sounds. No decreased breath sounds, wheezing, rhonchi or rales.   Abdominal:      General: Bowel sounds are normal.      Palpations: Abdomen is soft. There is no mass.      Tenderness: There is no abdominal tenderness. There is no guarding.   Musculoskeletal:        Hands:       Cervical back: Normal range of motion. Spasms and tenderness present. Muscular tenderness present.        Back:       Right knee: No deformity.      Left knee: No deformity.      Right lower leg: Normal. No edema.      Left lower leg: Normal. No edema.      Comments: Well healing scars on both knees   Lymphadenopathy:      Cervical: No cervical adenopathy.   Skin:     General: Skin is warm and dry.      Capillary Refill: Capillary refill takes less than 2 seconds.      Findings: No erythema or rash.          Neurological:      General: No focal deficit present.      Mental Status: She is alert and oriented to person, place, and time.      Cranial Nerves: No cranial nerve deficit.      Coordination: Coordination normal.      Deep Tendon Reflexes: Reflexes are normal and symmetric.   Psychiatric:         Attention and Perception: Attention normal.         Mood and Affect: Mood and affect normal. Mood is not anxious or depressed.         Speech: Speech normal.         Behavior: Behavior normal.         Thought Content: Thought content normal.         Cognition and Memory: Cognition normal.                  An electronic signature was used to authenticate this note.    --Donna Ho MD

## 2024-07-03 ENCOUNTER — HOSPITAL ENCOUNTER (OUTPATIENT)
Age: 74
Setting detail: SPECIMEN
Discharge: HOME | End: 2024-07-03

## 2024-07-03 DIAGNOSIS — R73.01 IMPAIRED FASTING BLOOD SUGAR: ICD-10-CM

## 2024-07-03 DIAGNOSIS — J30.2 SEASONAL ALLERGIES: ICD-10-CM

## 2024-07-03 DIAGNOSIS — R53.82 CHRONIC FATIGUE: ICD-10-CM

## 2024-07-03 DIAGNOSIS — E55.9 VITAMIN D DEFICIENCY, UNSPECIFIED: ICD-10-CM

## 2024-07-03 DIAGNOSIS — E78.5 HYPERLIPIDEMIA, UNSPECIFIED HYPERLIPIDEMIA TYPE: ICD-10-CM

## 2024-07-03 LAB
25(OH)D3 SERPL-MCNC: 54.9 NG/ML (ref 30–100)
ALBUMIN SERPL-MCNC: 4.3 G/DL (ref 3.5–5.2)
ALBUMIN/GLOB SERPL: 2 {RATIO} (ref 1–2.5)
ALP SERPL-CCNC: 65 U/L (ref 35–104)
ALT SERPL-CCNC: 32 U/L (ref 10–35)
ANION GAP SERPL CALCULATED.3IONS-SCNC: 11 MMOL/L (ref 9–16)
AST SERPL-CCNC: 26 U/L (ref 10–35)
BILIRUB SERPL-MCNC: 0.2 MG/DL (ref 0–1.2)
BUN SERPL-MCNC: 22 MG/DL (ref 8–23)
CALCIUM SERPL-MCNC: 8.9 MG/DL (ref 8.6–10.4)
CHLORIDE SERPL-SCNC: 101 MMOL/L (ref 98–107)
CHOLEST SERPL-MCNC: 221 MG/DL (ref 0–199)
CHOLESTEROL/HDL RATIO: 4
CO2 SERPL-SCNC: 25 MMOL/L (ref 20–31)
CREAT SERPL-MCNC: 0.7 MG/DL (ref 0.5–0.9)
ERYTHROCYTE [DISTWIDTH] IN BLOOD BY AUTOMATED COUNT: 13.4 % (ref 11.8–14.4)
EST. AVERAGE GLUCOSE BLD GHB EST-MCNC: 105 MG/DL
GFR, ESTIMATED: 88 ML/MIN/1.73M2
GLUCOSE SERPL-MCNC: 90 MG/DL (ref 74–99)
HBA1C MFR BLD: 5.3 % (ref 4–6)
HCT VFR BLD AUTO: 40.6 % (ref 36.3–47.1)
HDLC SERPL-MCNC: 54 MG/DL
HGB BLD-MCNC: 13.6 G/DL (ref 11.9–15.1)
LDLC SERPL CALC-MCNC: 131 MG/DL (ref 0–100)
MCH RBC QN AUTO: 30.6 PG (ref 25.2–33.5)
MCHC RBC AUTO-ENTMCNC: 33.5 G/DL (ref 28.4–34.8)
MCV RBC AUTO: 91.4 FL (ref 82.6–102.9)
NRBC BLD-RTO: 0 PER 100 WBC
PLATELET # BLD AUTO: 247 K/UL (ref 138–453)
PMV BLD AUTO: 8.6 FL (ref 8.1–13.5)
POTASSIUM SERPL-SCNC: 3.9 MMOL/L (ref 3.7–5.3)
PROT SERPL-MCNC: 6.5 G/DL (ref 6.6–8.7)
RBC # BLD AUTO: 4.44 M/UL (ref 3.95–5.11)
SODIUM SERPL-SCNC: 137 MMOL/L (ref 136–145)
TRIGL SERPL-MCNC: 180 MG/DL
TSH SERPL DL<=0.05 MIU/L-ACNC: 1.39 UIU/ML (ref 0.27–4.2)
VLDLC SERPL CALC-MCNC: 36 MG/DL
WBC OTHER # BLD: 6.7 K/UL (ref 3.5–11.3)

## 2024-07-28 DIAGNOSIS — F41.9 ANXIETY: ICD-10-CM

## 2024-07-30 RX ORDER — BUSPIRONE HYDROCHLORIDE 5 MG/1
5 TABLET ORAL 3 TIMES DAILY
Qty: 90 TABLET | Refills: 2 | OUTPATIENT
Start: 2024-07-30 | End: 2025-07-30

## 2024-09-04 DIAGNOSIS — Z13.820 SCREENING FOR OSTEOPOROSIS: ICD-10-CM

## 2024-09-04 DIAGNOSIS — Z78.0 POST-MENOPAUSAL: ICD-10-CM

## 2024-09-04 DIAGNOSIS — Z12.31 ENCOUNTER FOR SCREENING MAMMOGRAM FOR MALIGNANT NEOPLASM OF BREAST: ICD-10-CM

## 2024-09-26 ENCOUNTER — OFFICE VISIT (OUTPATIENT)
Dept: FAMILY MEDICINE CLINIC | Age: 74
End: 2024-09-26

## 2024-09-26 VITALS
DIASTOLIC BLOOD PRESSURE: 74 MMHG | SYSTOLIC BLOOD PRESSURE: 100 MMHG | HEIGHT: 63 IN | TEMPERATURE: 97.6 F | HEART RATE: 94 BPM | WEIGHT: 160.8 LBS | BODY MASS INDEX: 28.49 KG/M2 | OXYGEN SATURATION: 95 %

## 2024-09-26 DIAGNOSIS — D12.6 TUBULAR ADENOMA OF COLON: ICD-10-CM

## 2024-09-26 DIAGNOSIS — E55.9 VITAMIN D DEFICIENCY, UNSPECIFIED: ICD-10-CM

## 2024-09-26 DIAGNOSIS — G89.4 CHRONIC PAIN SYNDROME: ICD-10-CM

## 2024-09-26 DIAGNOSIS — R11.0 NAUSEA: ICD-10-CM

## 2024-09-26 DIAGNOSIS — J45.20 MILD INTERMITTENT ASTHMA WITHOUT COMPLICATION: ICD-10-CM

## 2024-09-26 DIAGNOSIS — K21.9 GASTROESOPHAGEAL REFLUX DISEASE WITHOUT ESOPHAGITIS: ICD-10-CM

## 2024-09-26 DIAGNOSIS — R01.1 HEART MURMUR: ICD-10-CM

## 2024-09-26 DIAGNOSIS — R51.9 CHRONIC INTRACTABLE HEADACHE, UNSPECIFIED HEADACHE TYPE: ICD-10-CM

## 2024-09-26 DIAGNOSIS — F32.4 MAJOR DEPRESSIVE DISORDER WITH SINGLE EPISODE, IN PARTIAL REMISSION (HCC): Primary | ICD-10-CM

## 2024-09-26 DIAGNOSIS — M54.2 NECK PAIN: ICD-10-CM

## 2024-09-26 DIAGNOSIS — E78.5 HYPERLIPIDEMIA, UNSPECIFIED HYPERLIPIDEMIA TYPE: ICD-10-CM

## 2024-09-26 DIAGNOSIS — Z86.61 HISTORY OF MENINGITIS: ICD-10-CM

## 2024-09-26 DIAGNOSIS — Z96.653 S/P TOTAL KNEE REPLACEMENT, BILATERAL: ICD-10-CM

## 2024-09-26 DIAGNOSIS — K44.9 HIATAL HERNIA: ICD-10-CM

## 2024-09-26 DIAGNOSIS — M17.0 PRIMARY OSTEOARTHRITIS OF BOTH KNEES: ICD-10-CM

## 2024-09-26 DIAGNOSIS — R53.82 CHRONIC FATIGUE: ICD-10-CM

## 2024-09-26 DIAGNOSIS — K31.84 GASTROPARESIS: ICD-10-CM

## 2024-09-26 DIAGNOSIS — G47.9 SLEEP DISTURBANCE: ICD-10-CM

## 2024-09-26 DIAGNOSIS — M19.012 PRIMARY OSTEOARTHRITIS OF BOTH SHOULDERS: ICD-10-CM

## 2024-09-26 DIAGNOSIS — M48.02 CERVICAL STENOSIS OF SPINE: ICD-10-CM

## 2024-09-26 DIAGNOSIS — G89.29 CHRONIC INTRACTABLE HEADACHE, UNSPECIFIED HEADACHE TYPE: ICD-10-CM

## 2024-09-26 DIAGNOSIS — D68.51 HOMOZYGOUS FACTOR V LEIDEN MUTATION (HCC): ICD-10-CM

## 2024-09-26 DIAGNOSIS — G43.809 OTHER MIGRAINE WITHOUT STATUS MIGRAINOSUS, NOT INTRACTABLE: ICD-10-CM

## 2024-09-26 DIAGNOSIS — M19.011 PRIMARY OSTEOARTHRITIS OF BOTH SHOULDERS: ICD-10-CM

## 2024-09-26 DIAGNOSIS — J30.2 SEASONAL ALLERGIES: ICD-10-CM

## 2024-09-26 DIAGNOSIS — Z86.19 HISTORY OF LYME DISEASE: ICD-10-CM

## 2024-09-26 DIAGNOSIS — D69.1 DELTA STORAGE POOL DISEASE (HCC): ICD-10-CM

## 2024-09-26 DIAGNOSIS — R73.01 IMPAIRED FASTING BLOOD SUGAR: ICD-10-CM

## 2024-09-26 DIAGNOSIS — F41.9 ANXIETY: ICD-10-CM

## 2024-09-26 DIAGNOSIS — M15.9 GENERALIZED OSTEOARTHRITIS OF MULTIPLE SITES: ICD-10-CM

## 2024-09-26 SDOH — ECONOMIC STABILITY: FOOD INSECURITY: WITHIN THE PAST 12 MONTHS, YOU WORRIED THAT YOUR FOOD WOULD RUN OUT BEFORE YOU GOT MONEY TO BUY MORE.: NEVER TRUE

## 2024-09-26 SDOH — ECONOMIC STABILITY: FOOD INSECURITY: WITHIN THE PAST 12 MONTHS, THE FOOD YOU BOUGHT JUST DIDN'T LAST AND YOU DIDN'T HAVE MONEY TO GET MORE.: NEVER TRUE

## 2024-09-26 SDOH — ECONOMIC STABILITY: INCOME INSECURITY: HOW HARD IS IT FOR YOU TO PAY FOR THE VERY BASICS LIKE FOOD, HOUSING, MEDICAL CARE, AND HEATING?: NOT HARD AT ALL

## 2024-09-26 NOTE — PROGRESS NOTES
postnasal drip, rhinorrhea, sinus pressure, sneezing, sore throat and tinnitus.    Eyes:  Negative for photophobia, pain, redness and visual disturbance.   Respiratory:  Negative for cough, chest tightness, shortness of breath, wheezing and stridor.         Asthma normally well controlled with symbicort, singulair and albuterol.    Cardiovascular:  Negative for chest pain, palpitations and leg swelling.   Gastrointestinal:  Negative for abdominal pain, blood in stool, constipation, diarrhea, nausea and vomiting.        Nausea and abdominal pain secondary to hiatal hernia, GERD and gastroparesis is well-controlled with dexilant, Nexium, reglan and zofran    Endocrine: Negative for polydipsia, polyphagia and polyuria.   Genitourinary:  Positive for frequency. Negative for decreased urine volume, dysuria, hematuria and urgency.   Musculoskeletal:  Positive for arthralgias (of the shoulders / knee pain s/p knee replacements -  much better / left thumb pain), back pain, joint swelling (right ring finger) and neck pain. Negative for myalgias.   Skin:  Positive for rash (occasional eczema) and wound (right lower back). Negative for color change.   Allergic/Immunologic: Negative for immunocompromised state.   Neurological:  Positive for weakness (intermittently) and headaches (finally better after botox). Negative for dizziness, tremors, seizures, light-headedness and numbness.   Hematological:  Negative for adenopathy. Does not bruise/bleed easily.   Psychiatric/Behavioral:  Positive for dysphoric mood (better controlled with increased pristiq) and sleep disturbance (better with increased trazodone - still not good but better). The patient is nervous/anxious (more anxiety).           Objective     Physical Exam  Vitals and nursing note reviewed.   Constitutional:       General: She is not in acute distress.     Appearance: Normal appearance. She is well-developed and normal weight. She is not ill-appearing, toxic-appearing

## 2024-10-03 DIAGNOSIS — F41.9 ANXIETY: ICD-10-CM

## 2024-10-03 DIAGNOSIS — E78.5 HYPERLIPIDEMIA, UNSPECIFIED HYPERLIPIDEMIA TYPE: ICD-10-CM

## 2024-10-03 RX ORDER — EZETIMIBE 10 MG/1
10 TABLET ORAL DAILY
Qty: 90 TABLET | Refills: 1 | Status: SHIPPED | OUTPATIENT
Start: 2024-10-03 | End: 2025-10-04

## 2024-10-03 RX ORDER — BUSPIRONE HYDROCHLORIDE 5 MG/1
5 TABLET ORAL 3 TIMES DAILY
Qty: 90 TABLET | Refills: 1 | Status: SHIPPED | OUTPATIENT
Start: 2024-10-03

## 2024-10-03 NOTE — TELEPHONE ENCOUNTER
LOV 9/26/24   RTO 1/6/25  LRF 4/9/24, 5/2/24          Controlled Substance Monitoring:    Acute and Chronic Pain Monitoring:        No data to display

## 2024-10-31 ENCOUNTER — TELEPHONE (OUTPATIENT)
Dept: FAMILY MEDICINE CLINIC | Age: 74
End: 2024-10-31

## 2024-10-31 DIAGNOSIS — K44.9 HIATAL HERNIA: ICD-10-CM

## 2024-10-31 DIAGNOSIS — K21.9 GASTROESOPHAGEAL REFLUX DISEASE WITHOUT ESOPHAGITIS: ICD-10-CM

## 2024-10-31 RX ORDER — DEXLANSOPRAZOLE 60 MG/1
60 CAPSULE, DELAYED RELEASE ORAL DAILY
Qty: 90 CAPSULE | Refills: 3 | Status: SHIPPED | OUTPATIENT
Start: 2024-10-31 | End: 2025-10-31

## 2024-10-31 NOTE — TELEPHONE ENCOUNTER
Patient calls in today requesting a paper script for Dexlansoprazole 60 mg.  She will be sending it to [facility].    Please call patient when ready to be picked up.

## 2024-10-31 NOTE — TELEPHONE ENCOUNTER
Moving forward -please send this request to clinical staff who will then place the prescription for me to sign.    Prescription placed, printed and signed and placed in folder.

## 2024-11-15 DIAGNOSIS — G47.9 SLEEP DISTURBANCE: ICD-10-CM

## 2024-11-15 NOTE — TELEPHONE ENCOUNTER
LOV 9/26/24   RTO 1/6/25  LRF 5/31/24          Controlled Substance Monitoring:    Acute and Chronic Pain Monitoring:        No data to display

## 2024-11-16 RX ORDER — TRAZODONE HYDROCHLORIDE 100 MG/1
TABLET ORAL
Qty: 270 TABLET | Refills: 1 | Status: SHIPPED | OUTPATIENT
Start: 2024-11-16

## 2024-11-16 RX ORDER — DESVENLAFAXINE 100 MG/1
100 TABLET, EXTENDED RELEASE ORAL DAILY
Qty: 90 TABLET | Refills: 1 | Status: SHIPPED | OUTPATIENT
Start: 2024-11-16

## 2025-01-05 ASSESSMENT — PATIENT HEALTH QUESTIONNAIRE - PHQ9
5. POOR APPETITE OR OVEREATING: SEVERAL DAYS
9. THOUGHTS THAT YOU WOULD BE BETTER OFF DEAD, OR OF HURTING YOURSELF: NOT AT ALL
3. TROUBLE FALLING OR STAYING ASLEEP: NOT AT ALL
6. FEELING BAD ABOUT YOURSELF - OR THAT YOU ARE A FAILURE OR HAVE LET YOURSELF OR YOUR FAMILY DOWN: NOT AT ALL
7. TROUBLE CONCENTRATING ON THINGS, SUCH AS READING THE NEWSPAPER OR WATCHING TELEVISION: NOT AT ALL
10. IF YOU CHECKED OFF ANY PROBLEMS, HOW DIFFICULT HAVE THESE PROBLEMS MADE IT FOR YOU TO DO YOUR WORK, TAKE CARE OF THINGS AT HOME, OR GET ALONG WITH OTHER PEOPLE: SOMEWHAT DIFFICULT
SUM OF ALL RESPONSES TO PHQ9 QUESTIONS 1 & 2: 2
SUM OF ALL RESPONSES TO PHQ QUESTIONS 1-9: 4
6. FEELING BAD ABOUT YOURSELF - OR THAT YOU ARE A FAILURE OR HAVE LET YOURSELF OR YOUR FAMILY DOWN: NOT AT ALL
2. FEELING DOWN, DEPRESSED OR HOPELESS: SEVERAL DAYS
1. LITTLE INTEREST OR PLEASURE IN DOING THINGS: SEVERAL DAYS
SUM OF ALL RESPONSES TO PHQ QUESTIONS 1-9: 4
3. TROUBLE FALLING OR STAYING ASLEEP: NOT AT ALL
8. MOVING OR SPEAKING SO SLOWLY THAT OTHER PEOPLE COULD HAVE NOTICED. OR THE OPPOSITE, BEING SO FIGETY OR RESTLESS THAT YOU HAVE BEEN MOVING AROUND A LOT MORE THAN USUAL: NOT AT ALL
SUM OF ALL RESPONSES TO PHQ QUESTIONS 1-9: 4
10. IF YOU CHECKED OFF ANY PROBLEMS, HOW DIFFICULT HAVE THESE PROBLEMS MADE IT FOR YOU TO DO YOUR WORK, TAKE CARE OF THINGS AT HOME, OR GET ALONG WITH OTHER PEOPLE: SOMEWHAT DIFFICULT
9. THOUGHTS THAT YOU WOULD BE BETTER OFF DEAD, OR OF HURTING YOURSELF: NOT AT ALL
4. FEELING TIRED OR HAVING LITTLE ENERGY: SEVERAL DAYS
5. POOR APPETITE OR OVEREATING: SEVERAL DAYS
2. FEELING DOWN, DEPRESSED OR HOPELESS: SEVERAL DAYS
1. LITTLE INTEREST OR PLEASURE IN DOING THINGS: SEVERAL DAYS
7. TROUBLE CONCENTRATING ON THINGS, SUCH AS READING THE NEWSPAPER OR WATCHING TELEVISION: NOT AT ALL
SUM OF ALL RESPONSES TO PHQ QUESTIONS 1-9: 4
4. FEELING TIRED OR HAVING LITTLE ENERGY: SEVERAL DAYS
8. MOVING OR SPEAKING SO SLOWLY THAT OTHER PEOPLE COULD HAVE NOTICED. OR THE OPPOSITE - BEING SO FIDGETY OR RESTLESS THAT YOU HAVE BEEN MOVING AROUND A LOT MORE THAN USUAL: NOT AT ALL
SUM OF ALL RESPONSES TO PHQ QUESTIONS 1-9: 4

## 2025-01-06 ENCOUNTER — OFFICE VISIT (OUTPATIENT)
Dept: FAMILY MEDICINE CLINIC | Age: 75
End: 2025-01-06

## 2025-01-06 VITALS
HEART RATE: 88 BPM | BODY MASS INDEX: 29.45 KG/M2 | DIASTOLIC BLOOD PRESSURE: 78 MMHG | HEIGHT: 63 IN | OXYGEN SATURATION: 95 % | SYSTOLIC BLOOD PRESSURE: 126 MMHG | TEMPERATURE: 98 F | WEIGHT: 166.2 LBS

## 2025-01-06 DIAGNOSIS — Z86.19 HISTORY OF LYME DISEASE: ICD-10-CM

## 2025-01-06 DIAGNOSIS — M54.2 NECK PAIN: ICD-10-CM

## 2025-01-06 DIAGNOSIS — K44.9 HIATAL HERNIA: ICD-10-CM

## 2025-01-06 DIAGNOSIS — R53.82 CHRONIC FATIGUE: ICD-10-CM

## 2025-01-06 DIAGNOSIS — M19.011 PRIMARY OSTEOARTHRITIS OF BOTH SHOULDERS: ICD-10-CM

## 2025-01-06 DIAGNOSIS — D69.1 DELTA STORAGE POOL DISEASE (HCC): ICD-10-CM

## 2025-01-06 DIAGNOSIS — R51.9 CHRONIC INTRACTABLE HEADACHE, UNSPECIFIED HEADACHE TYPE: ICD-10-CM

## 2025-01-06 DIAGNOSIS — F41.9 ANXIETY: ICD-10-CM

## 2025-01-06 DIAGNOSIS — R11.0 NAUSEA: ICD-10-CM

## 2025-01-06 DIAGNOSIS — J30.2 SEASONAL ALLERGIES: ICD-10-CM

## 2025-01-06 DIAGNOSIS — R01.1 HEART MURMUR: ICD-10-CM

## 2025-01-06 DIAGNOSIS — Z86.61 HISTORY OF MENINGITIS: ICD-10-CM

## 2025-01-06 DIAGNOSIS — G43.809 OTHER MIGRAINE WITHOUT STATUS MIGRAINOSUS, NOT INTRACTABLE: ICD-10-CM

## 2025-01-06 DIAGNOSIS — F32.4 MAJOR DEPRESSIVE DISORDER WITH SINGLE EPISODE, IN PARTIAL REMISSION (HCC): Primary | ICD-10-CM

## 2025-01-06 DIAGNOSIS — G89.29 CHRONIC INTRACTABLE HEADACHE, UNSPECIFIED HEADACHE TYPE: ICD-10-CM

## 2025-01-06 DIAGNOSIS — D68.51 HOMOZYGOUS FACTOR V LEIDEN MUTATION (HCC): ICD-10-CM

## 2025-01-06 DIAGNOSIS — R73.01 IMPAIRED FASTING BLOOD SUGAR: ICD-10-CM

## 2025-01-06 DIAGNOSIS — J45.20 MILD INTERMITTENT ASTHMA WITHOUT COMPLICATION: ICD-10-CM

## 2025-01-06 DIAGNOSIS — K31.84 GASTROPARESIS: ICD-10-CM

## 2025-01-06 DIAGNOSIS — M19.012 PRIMARY OSTEOARTHRITIS OF BOTH SHOULDERS: ICD-10-CM

## 2025-01-06 DIAGNOSIS — M48.02 CERVICAL STENOSIS OF SPINE: ICD-10-CM

## 2025-01-06 DIAGNOSIS — G89.4 CHRONIC PAIN SYNDROME: ICD-10-CM

## 2025-01-06 DIAGNOSIS — M15.9 GENERALIZED OSTEOARTHRITIS OF MULTIPLE SITES: ICD-10-CM

## 2025-01-06 DIAGNOSIS — K21.9 GASTROESOPHAGEAL REFLUX DISEASE WITHOUT ESOPHAGITIS: ICD-10-CM

## 2025-01-06 DIAGNOSIS — M17.0 PRIMARY OSTEOARTHRITIS OF BOTH KNEES: ICD-10-CM

## 2025-01-06 DIAGNOSIS — E78.5 HYPERLIPIDEMIA, UNSPECIFIED HYPERLIPIDEMIA TYPE: ICD-10-CM

## 2025-01-06 DIAGNOSIS — G47.9 SLEEP DISTURBANCE: ICD-10-CM

## 2025-01-06 DIAGNOSIS — D12.6 TUBULAR ADENOMA OF COLON: ICD-10-CM

## 2025-01-06 DIAGNOSIS — R10.11 ABDOMINAL PAIN, RUQ: ICD-10-CM

## 2025-01-06 DIAGNOSIS — Z96.653 S/P TOTAL KNEE REPLACEMENT, BILATERAL: ICD-10-CM

## 2025-01-06 DIAGNOSIS — E55.9 VITAMIN D DEFICIENCY, UNSPECIFIED: ICD-10-CM

## 2025-01-06 ASSESSMENT — ENCOUNTER SYMPTOMS
SINUS PRESSURE: 0
COLOR CHANGE: 0
RHINORRHEA: 0
EYE REDNESS: 0
EYE PAIN: 0
BACK PAIN: 1
CHEST TIGHTNESS: 0
SORE THROAT: 0
WHEEZING: 0
STRIDOR: 0
DIARRHEA: 0
BLOOD IN STOOL: 0
SHORTNESS OF BREATH: 0
ABDOMINAL PAIN: 0
PHOTOPHOBIA: 0
CONSTIPATION: 0
COUGH: 0
VOMITING: 0
NAUSEA: 0

## 2025-01-06 NOTE — PROGRESS NOTES
Toshia Alcala (:  1950) is a 74 y.o. female,Established patient, here for evaluation of the following chief complaint(s):    Depression         Assessment & Plan  Major depressive disorder with single episode, in partial remission (HCC)            Anxiety            Sleep disturbance            Chronic fatigue       Orders:    TSH; Future    CBC; Future    Mild intermittent asthma without complication            Seasonal allergies            Gastroparesis       Orders:    US GALLBLADDER RUQ; Future    Nausea       Orders:    US GALLBLADDER RUQ; Future    Gastroesophageal reflux disease without esophagitis       Orders:    US GALLBLADDER RUQ; Future    Hiatal hernia       Orders:    US GALLBLADDER RUQ; Future    Abdominal pain, RUQ       Orders:    US GALLBLADDER RUQ; Future    Chronic intractable headache, unspecified headache type            Other migraine without status migrainosus, not intractable            History of meningitis            History of Lyme disease            Chronic pain syndrome            Neck pain            Cervical stenosis of spine            Heart murmur            Hyperlipidemia, unspecified hyperlipidemia type       Orders:    Lipid Panel; Future    Impaired fasting blood sugar       Orders:    Comprehensive Metabolic Panel; Future    Hemoglobin A1C; Future    Generalized osteoarthritis of multiple sites            Primary osteoarthritis of both shoulders            Primary osteoarthritis of both knees            S/p total knee replacement, bilateral            Vitamin D deficiency, unspecified       Orders:    Vitamin D 25 Hydroxy; Future    Homozygous Factor V Leiden mutation (HCC)            Delta storage pool disease (HCC)            Tubular adenoma of colon               Continue same medications  Continue Pristiq at same dosage  Continue trazodone at same dosage  Continue BuSpar  Good sleep hygiene recommended  Obtain fasting labs as ordered   Daily exercise and healthy

## 2025-01-12 DIAGNOSIS — K31.84 GASTROPARESIS: ICD-10-CM

## 2025-01-13 NOTE — TELEPHONE ENCOUNTER
LOV 1/6/2025   RTO 2/17/25  LRF 1/24/24          Controlled Substance Monitoring:    Acute and Chronic Pain Monitoring:        No data to display

## 2025-01-14 RX ORDER — METOCLOPRAMIDE 10 MG/1
TABLET ORAL
Qty: 270 TABLET | Refills: 1 | Status: SHIPPED | OUTPATIENT
Start: 2025-01-14

## 2025-01-29 ENCOUNTER — HOSPITAL ENCOUNTER (OUTPATIENT)
Dept: ULTRASOUND IMAGING | Facility: CLINIC | Age: 75
Discharge: HOME | End: 2025-01-31
Payer: MEDICARE

## 2025-01-29 DIAGNOSIS — K21.9 GASTROESOPHAGEAL REFLUX DISEASE WITHOUT ESOPHAGITIS: ICD-10-CM

## 2025-01-29 DIAGNOSIS — R10.11 ABDOMINAL PAIN, RUQ: ICD-10-CM

## 2025-01-29 DIAGNOSIS — K44.9 HIATAL HERNIA: ICD-10-CM

## 2025-01-29 DIAGNOSIS — K31.84 GASTROPARESIS: ICD-10-CM

## 2025-01-29 DIAGNOSIS — R11.0 NAUSEA: ICD-10-CM

## 2025-01-29 PROCEDURE — 76705 ECHO EXAM OF ABDOMEN: CPT

## 2025-02-05 ENCOUNTER — PATIENT MESSAGE (OUTPATIENT)
Dept: FAMILY MEDICINE CLINIC | Age: 75
End: 2025-02-05

## 2025-02-05 DIAGNOSIS — R10.11 ABDOMINAL PAIN, RUQ: ICD-10-CM

## 2025-02-05 DIAGNOSIS — K44.9 HIATAL HERNIA: ICD-10-CM

## 2025-02-05 DIAGNOSIS — K31.84 GASTROPARESIS: Primary | ICD-10-CM

## 2025-02-05 DIAGNOSIS — R11.0 NAUSEA: ICD-10-CM

## 2025-02-05 DIAGNOSIS — K21.9 GASTROESOPHAGEAL REFLUX DISEASE WITHOUT ESOPHAGITIS: ICD-10-CM

## 2025-02-05 NOTE — TELEPHONE ENCOUNTER
Order signed.  Please fax with any appropriate information.  Please let patient know and this has been faxed.    Close encounter when complete.

## 2025-02-14 SDOH — ECONOMIC STABILITY: FOOD INSECURITY: WITHIN THE PAST 12 MONTHS, THE FOOD YOU BOUGHT JUST DIDN'T LAST AND YOU DIDN'T HAVE MONEY TO GET MORE.: NEVER TRUE

## 2025-02-14 SDOH — ECONOMIC STABILITY: TRANSPORTATION INSECURITY
IN THE PAST 12 MONTHS, HAS THE LACK OF TRANSPORTATION KEPT YOU FROM MEDICAL APPOINTMENTS OR FROM GETTING MEDICATIONS?: NO

## 2025-02-14 SDOH — ECONOMIC STABILITY: FOOD INSECURITY: WITHIN THE PAST 12 MONTHS, YOU WORRIED THAT YOUR FOOD WOULD RUN OUT BEFORE YOU GOT MONEY TO BUY MORE.: NEVER TRUE

## 2025-02-14 SDOH — HEALTH STABILITY: PHYSICAL HEALTH: ON AVERAGE, HOW MANY MINUTES DO YOU ENGAGE IN EXERCISE AT THIS LEVEL?: 10 MIN

## 2025-02-14 SDOH — HEALTH STABILITY: PHYSICAL HEALTH: ON AVERAGE, HOW MANY DAYS PER WEEK DO YOU ENGAGE IN MODERATE TO STRENUOUS EXERCISE (LIKE A BRISK WALK)?: 2 DAYS

## 2025-02-14 ASSESSMENT — LIFESTYLE VARIABLES
HOW OFTEN DO YOU HAVE A DRINK CONTAINING ALCOHOL: 1
HOW OFTEN DO YOU HAVE A DRINK CONTAINING ALCOHOL: NEVER
HOW MANY STANDARD DRINKS CONTAINING ALCOHOL DO YOU HAVE ON A TYPICAL DAY: 0
HOW MANY STANDARD DRINKS CONTAINING ALCOHOL DO YOU HAVE ON A TYPICAL DAY: PATIENT DOES NOT DRINK
HOW OFTEN DO YOU HAVE SIX OR MORE DRINKS ON ONE OCCASION: 1

## 2025-02-14 ASSESSMENT — PATIENT HEALTH QUESTIONNAIRE - PHQ9
9. THOUGHTS THAT YOU WOULD BE BETTER OFF DEAD, OR OF HURTING YOURSELF: NOT AT ALL
3. TROUBLE FALLING OR STAYING ASLEEP: NOT AT ALL
1. LITTLE INTEREST OR PLEASURE IN DOING THINGS: SEVERAL DAYS
10. IF YOU CHECKED OFF ANY PROBLEMS, HOW DIFFICULT HAVE THESE PROBLEMS MADE IT FOR YOU TO DO YOUR WORK, TAKE CARE OF THINGS AT HOME, OR GET ALONG WITH OTHER PEOPLE: NOT DIFFICULT AT ALL
8. MOVING OR SPEAKING SO SLOWLY THAT OTHER PEOPLE COULD HAVE NOTICED. OR THE OPPOSITE, BEING SO FIGETY OR RESTLESS THAT YOU HAVE BEEN MOVING AROUND A LOT MORE THAN USUAL: NOT AT ALL
SUM OF ALL RESPONSES TO PHQ QUESTIONS 1-9: 1
7. TROUBLE CONCENTRATING ON THINGS, SUCH AS READING THE NEWSPAPER OR WATCHING TELEVISION: NOT AT ALL
5. POOR APPETITE OR OVEREATING: NOT AT ALL
SUM OF ALL RESPONSES TO PHQ QUESTIONS 1-9: 1
SUM OF ALL RESPONSES TO PHQ9 QUESTIONS 1 & 2: 1
SUM OF ALL RESPONSES TO PHQ QUESTIONS 1-9: 1
6. FEELING BAD ABOUT YOURSELF - OR THAT YOU ARE A FAILURE OR HAVE LET YOURSELF OR YOUR FAMILY DOWN: NOT AT ALL
SUM OF ALL RESPONSES TO PHQ QUESTIONS 1-9: 1
4. FEELING TIRED OR HAVING LITTLE ENERGY: NOT AT ALL
2. FEELING DOWN, DEPRESSED OR HOPELESS: NOT AT ALL

## 2025-02-17 ENCOUNTER — OFFICE VISIT (OUTPATIENT)
Dept: FAMILY MEDICINE CLINIC | Age: 75
End: 2025-02-17

## 2025-02-17 VITALS
HEIGHT: 63 IN | OXYGEN SATURATION: 94 % | WEIGHT: 162 LBS | HEART RATE: 94 BPM | BODY MASS INDEX: 28.7 KG/M2 | SYSTOLIC BLOOD PRESSURE: 118 MMHG | TEMPERATURE: 98.7 F | DIASTOLIC BLOOD PRESSURE: 64 MMHG

## 2025-02-17 DIAGNOSIS — G47.9 SLEEP DISTURBANCE: ICD-10-CM

## 2025-02-17 DIAGNOSIS — J30.2 SEASONAL ALLERGIES: ICD-10-CM

## 2025-02-17 DIAGNOSIS — M54.2 NECK PAIN: ICD-10-CM

## 2025-02-17 DIAGNOSIS — K31.84 GASTROPARESIS: ICD-10-CM

## 2025-02-17 DIAGNOSIS — E55.9 VITAMIN D DEFICIENCY, UNSPECIFIED: ICD-10-CM

## 2025-02-17 DIAGNOSIS — M19.012 PRIMARY OSTEOARTHRITIS OF BOTH SHOULDERS: ICD-10-CM

## 2025-02-17 DIAGNOSIS — R51.9 CHRONIC INTRACTABLE HEADACHE, UNSPECIFIED HEADACHE TYPE: ICD-10-CM

## 2025-02-17 DIAGNOSIS — J45.20 MILD INTERMITTENT ASTHMA WITHOUT COMPLICATION: ICD-10-CM

## 2025-02-17 DIAGNOSIS — G89.29 CHRONIC INTRACTABLE HEADACHE, UNSPECIFIED HEADACHE TYPE: ICD-10-CM

## 2025-02-17 DIAGNOSIS — R11.0 NAUSEA: ICD-10-CM

## 2025-02-17 DIAGNOSIS — F32.4 MAJOR DEPRESSIVE DISORDER WITH SINGLE EPISODE, IN PARTIAL REMISSION: ICD-10-CM

## 2025-02-17 DIAGNOSIS — M17.0 PRIMARY OSTEOARTHRITIS OF BOTH KNEES: ICD-10-CM

## 2025-02-17 DIAGNOSIS — G89.4 CHRONIC PAIN SYNDROME: ICD-10-CM

## 2025-02-17 DIAGNOSIS — Z96.653 S/P TOTAL KNEE REPLACEMENT, BILATERAL: ICD-10-CM

## 2025-02-17 DIAGNOSIS — Z00.00 MEDICARE ANNUAL WELLNESS VISIT, SUBSEQUENT: Primary | ICD-10-CM

## 2025-02-17 DIAGNOSIS — K44.9 HIATAL HERNIA: ICD-10-CM

## 2025-02-17 DIAGNOSIS — R01.1 HEART MURMUR: ICD-10-CM

## 2025-02-17 DIAGNOSIS — G43.809 OTHER MIGRAINE WITHOUT STATUS MIGRAINOSUS, NOT INTRACTABLE: ICD-10-CM

## 2025-02-17 DIAGNOSIS — Z86.19 HISTORY OF LYME DISEASE: ICD-10-CM

## 2025-02-17 DIAGNOSIS — D68.51 HOMOZYGOUS FACTOR V LEIDEN MUTATION: ICD-10-CM

## 2025-02-17 DIAGNOSIS — M48.02 CERVICAL STENOSIS OF SPINE: ICD-10-CM

## 2025-02-17 DIAGNOSIS — M15.9 GENERALIZED OSTEOARTHRITIS OF MULTIPLE SITES: ICD-10-CM

## 2025-02-17 DIAGNOSIS — R73.01 IMPAIRED FASTING BLOOD SUGAR: ICD-10-CM

## 2025-02-17 DIAGNOSIS — R53.82 CHRONIC FATIGUE: ICD-10-CM

## 2025-02-17 DIAGNOSIS — M19.011 PRIMARY OSTEOARTHRITIS OF BOTH SHOULDERS: ICD-10-CM

## 2025-02-17 DIAGNOSIS — D69.1 DELTA STORAGE POOL DISEASE (HCC): ICD-10-CM

## 2025-02-17 DIAGNOSIS — K21.9 GASTROESOPHAGEAL REFLUX DISEASE WITHOUT ESOPHAGITIS: ICD-10-CM

## 2025-02-17 DIAGNOSIS — E78.5 HYPERLIPIDEMIA, UNSPECIFIED HYPERLIPIDEMIA TYPE: ICD-10-CM

## 2025-02-17 DIAGNOSIS — Z86.61 HISTORY OF MENINGITIS: ICD-10-CM

## 2025-02-17 DIAGNOSIS — F41.9 ANXIETY: ICD-10-CM

## 2025-02-17 DIAGNOSIS — D12.6 TUBULAR ADENOMA OF COLON: ICD-10-CM

## 2025-02-17 RX ORDER — ANTACID TABLETS 500 MG/1
400 TABLET, CHEWABLE ORAL
COMMUNITY

## 2025-02-17 ASSESSMENT — ENCOUNTER SYMPTOMS
RHINORRHEA: 0
SINUS PRESSURE: 0
SHORTNESS OF BREATH: 0
EYE PAIN: 0
WHEEZING: 0
EYE REDNESS: 0
COUGH: 0
VOMITING: 0
ABDOMINAL PAIN: 0
COLOR CHANGE: 0
STRIDOR: 0
BLOOD IN STOOL: 0
PHOTOPHOBIA: 0
CONSTIPATION: 0
NAUSEA: 0
SORE THROAT: 0
DIARRHEA: 0
CHEST TIGHTNESS: 0
BACK PAIN: 1

## 2025-02-17 NOTE — PATIENT INSTRUCTIONS
Learning About Being Active as an Older Adult  Why is being active important as you get older?     Being active is one of the best things you can do for your health. And it's never too late to start. Being active--or getting active, if you aren't already--has definite benefits. It can:  Give you more energy,  Keep your mind sharp.  Improve balance to reduce your risk of falls.  Help you manage chronic illness with fewer medicines.  No matter how old you are, how fit you are, or what health problems you have, there is a form of activity that will work for you. And the more physical activity you can do, the better your overall health will be.  What kinds of activity can help you stay healthy?  Being more active will make your daily activities easier. Physical activity includes planned exercise and things you do in daily life. There are four types of activity:  Aerobic.  Doing aerobic activity makes your heart and lungs strong.  Includes walking, dancing, and gardening.  Aim for at least 2½ hours spread throughout the week.  It improves your energy and can help you sleep better.  Muscle-strengthening.  This type of activity can help maintain muscle and strengthen bones.  Includes climbing stairs, using resistance bands, and lifting or carrying heavy loads.  Aim for at least twice a week.  It can help protect the knees and other joints.  Stretching.  Stretching gives you better range of motion in joints and muscles.  Includes upper arm stretches, calf stretches, and gentle yoga.  Aim for at least twice a week, preferably after your muscles are warmed up from other activities.  It can help you function better in daily life.  Balancing.  This helps you stay coordinated and have good posture.  Includes heel-to-toe walking, braxton chi, and certain types of yoga.  Aim for at least 3 days a week.  It can reduce your risk of falling.  Even if you have a hard time meeting the recommendations, it's better to be more active

## 2025-02-17 NOTE — PROGRESS NOTES
Medicare Annual Wellness Visit    Toshia Alcala is here for Medicare AWV    Assessment & Plan     Medicare annual wellness visit, subsequent       Return in 1 year (on 2/17/2026) for Medicare AWV.         Subjective         Patient's complete Health Risk Assessment and screening values have been reviewed and are found in Flowsheets. The following problems were reviewed today and where indicated follow up appointments were made and/or referrals ordered.    Positive Risk Factor Screenings with Interventions:              Inactivity:  On average, how many days per week do you engage in moderate to strenuous exercise (like a brisk walk)?: 2 days (!) Abnormal  On average, how many minutes do you engage in exercise at this level?: 10 min  Interventions:  Patient counseled on regular exercise - 30 minutes 5 days per week     Poor Eating Habits/Diet:  Do you eat balanced/healthy meals regularly?: (!) No  Interventions:  Patient encouraged to eat healthy, balanced meals regularly                         Objective   Vitals:    02/17/25 0854   BP: 118/64   Pulse: 94   Temp: 98.7 °F (37.1 °C)   TempSrc: Tympanic   SpO2: 94%   Weight: 73.5 kg (162 lb)   Height: 1.588 m (5' 2.5\")      Body mass index is 29.16 kg/m².                  Allergies   Allergen Reactions    Latex     Promethazine Other (See Comments)    Zolpidem Anxiety    Corticosteroids Headaches, Other (See Comments) and Swelling    Meclizine Other (See Comments)     Blurry vision    Pregabalin Swelling    Adhesive Tape Itching     Swelling and rash    Ambien [Zolpidem Tartrate]      Feels crazy    Statins Other (See Comments)     Muscle pain    Ciprofloxacin Nausea And Vomiting    Influenza Vaccines Nausea And Vomiting    Meperidine And Related Nausea And Vomiting    Other Nausea And Vomiting     All Narcotic pain medications     Povidone-Iodine Rash    Prozac [Fluoxetine Hcl] Nausea And Vomiting    Tramadol Nausea And Vomiting     Low blood pressure     Prior to

## 2025-02-17 NOTE — PROGRESS NOTES
Toshia Alcala (:  1950) is a 74 y.o. female,Established patient, here for evaluation of the following chief complaint(s):    Medicare AWV, Depression, Anxiety, Insomnia, Fatigue, Asthma, and Allergies         Assessment & Plan  Medicare annual wellness visit, subsequent            Major depressive disorder with single episode, in partial remission (HCC)            Anxiety            Sleep disturbance            Chronic fatigue            Mild intermittent asthma without complication            Seasonal allergies            Gastroparesis            Nausea            Gastroesophageal reflux disease without esophagitis            Hiatal hernia            Chronic intractable headache, unspecified headache type            Other migraine without status migrainosus, not intractable            History of meningitis            History of Lyme disease            Chronic pain syndrome            Neck pain            Cervical stenosis of spine            Heart murmur            Hyperlipidemia, unspecified hyperlipidemia type            Impaired fasting blood sugar            Generalized osteoarthritis of multiple sites            Primary osteoarthritis of both shoulders            Primary osteoarthritis of both knees            S/p total knee replacement, bilateral            Vitamin D deficiency, unspecified            Homozygous Factor V Leiden mutation (HCC)            Delta storage pool disease (HCC)            Tubular adenoma of colon              Continue same medications  Continue Pristiq at same dosage  Continue BuSpar  Continue trazodone at same dosage  Good sleep hygiene recommended  Daily exercise and healthy diet encouraged  Continue Breo and albuterol   Continue Reglan twice daily and Zofran as needed  Continue Dexilant and nexium  Follow-up with GI - she was told to call in April for new patient August appointment  Continue Aimovig  Continue botox  Maxalt / fioricet for abortive therapy  Try ubrelvy for

## 2025-03-28 DIAGNOSIS — J45.20 MILD INTERMITTENT ASTHMA WITHOUT COMPLICATION: ICD-10-CM

## 2025-03-28 DIAGNOSIS — J45.21 MILD INTERMITTENT ASTHMA WITH ACUTE EXACERBATION: ICD-10-CM

## 2025-03-28 NOTE — TELEPHONE ENCOUNTER
LOV 2/17/25   RTO 5/19/25  LRF 5/15/24          Controlled Substance Monitoring:    Acute and Chronic Pain Monitoring:        No data to display

## 2025-03-29 RX ORDER — FLUTICASONE FUROATE AND VILANTEROL TRIFENATATE 200; 25 UG/1; UG/1
POWDER RESPIRATORY (INHALATION)
Qty: 1 EACH | Refills: 3 | Status: SHIPPED | OUTPATIENT
Start: 2025-03-29

## 2025-04-05 DIAGNOSIS — E78.5 HYPERLIPIDEMIA, UNSPECIFIED HYPERLIPIDEMIA TYPE: ICD-10-CM

## 2025-04-08 RX ORDER — EZETIMIBE 10 MG/1
10 TABLET ORAL DAILY
Qty: 90 TABLET | Refills: 1 | Status: SHIPPED | OUTPATIENT
Start: 2025-04-08 | End: 2026-04-09

## 2025-04-08 NOTE — TELEPHONE ENCOUNTER
LOV 2/17/25   RTO 5/19/25  LRF 10/3/24          Controlled Substance Monitoring:    Acute and Chronic Pain Monitoring:        No data to display

## 2025-04-23 NOTE — TELEPHONE ENCOUNTER
LOV 2-10-21  LRF 12-23-20    Health Maintenance   Topic Date Due    Diabetes screen  Never done    DTaP/Tdap/Td vaccine (2 - Td or Tdap) 03/01/2021    Annual Wellness Visit (AWV)  09/10/2021    Breast cancer screen  10/22/2022    Lipid screen  03/31/2026    Colon cancer screen colonoscopy  11/08/2028    DEXA (modify frequency per FRAX score)  Completed    Shingles Vaccine  Completed    Pneumococcal 65+ years Vaccine  Completed    COVID-19 Vaccine  Completed    Hepatitis C screen  Completed    Hepatitis A vaccine  Aged Out    Hepatitis B vaccine  Aged Out    Hib vaccine  Aged Out    Meningococcal (ACWY) vaccine  Aged Out             (applicable per patient's age: Cancer Screenings, Depression Screening, Fall Risk Screening, Immunizations)    LDL Cholesterol (mg/dL)   Date Value   08/15/2018 138 (H)     LDL Calculated (mg/dL)   Date Value   03/31/2021 120     AST (U/L)   Date Value   03/31/2021 18     ALT (U/L)   Date Value   03/31/2021 20     BUN (mg/dL)   Date Value   03/31/2021 22      (goal A1C is < 7)   (goal LDL is <100) need 30-50% reduction from baseline     BP Readings from Last 3 Encounters:   03/23/21 126/81   02/10/21 136/84   09/09/20 118/84    (goal /80)      All Future Testing planned in [facility]:  Lab Frequency Next Occurrence   Comprehensive Metabolic Panel, Fasting Once 07/05/2021   Hemoglobin A1C Once 07/05/2021       Next Visit Date:  Future Appointments   Date Time Provider Petey Moon   8/11/2021  9:30 AM Rishi Flores MD [facility]on[facility] Humphreys [facility]            Patient Active Problem List:     Homozygous Factor V Leiden mutation ([facility] [address]     Mild intermittent asthma without complication     Delta storage pool disease [facility])     Cervical stenosis of spine     Primary osteoarthritis of both shoulders     Primary osteoarthritis of both knees     Hyperlipidemia     History of meningitis     Gastroesophageal reflux disease     Hiatal hernia     Colon adenoma     Major
What Type Of Note Output Would You Prefer (Optional)?: Standard Output
Hpi Title: Evaluation of Skin Lesions
How Severe Are Your Spot(S)?: mild

## 2025-05-04 DIAGNOSIS — G47.9 SLEEP DISTURBANCE: ICD-10-CM

## 2025-05-05 NOTE — TELEPHONE ENCOUNTER
LOV 2/17/25   RTO 5/19/25  LRF 11/16/24          Controlled Substance Monitoring:    Acute and Chronic Pain Monitoring:        No data to display

## 2025-05-08 RX ORDER — TRAZODONE HYDROCHLORIDE 100 MG/1
TABLET ORAL
Qty: 270 TABLET | Refills: 1 | Status: SHIPPED | OUTPATIENT
Start: 2025-05-08

## 2025-05-19 ENCOUNTER — OFFICE VISIT (OUTPATIENT)
Dept: FAMILY MEDICINE CLINIC | Age: 75
End: 2025-05-19

## 2025-05-19 VITALS
SYSTOLIC BLOOD PRESSURE: 102 MMHG | TEMPERATURE: 97.7 F | HEART RATE: 100 BPM | DIASTOLIC BLOOD PRESSURE: 72 MMHG | WEIGHT: 160 LBS | BODY MASS INDEX: 28.35 KG/M2 | OXYGEN SATURATION: 95 % | HEIGHT: 63 IN

## 2025-05-19 DIAGNOSIS — Z86.61 HISTORY OF MENINGITIS: ICD-10-CM

## 2025-05-19 DIAGNOSIS — R01.1 HEART MURMUR: ICD-10-CM

## 2025-05-19 DIAGNOSIS — M48.02 CERVICAL STENOSIS OF SPINE: ICD-10-CM

## 2025-05-19 DIAGNOSIS — F41.9 ANXIETY: ICD-10-CM

## 2025-05-19 DIAGNOSIS — M54.2 NECK PAIN: ICD-10-CM

## 2025-05-19 DIAGNOSIS — Z86.19 HISTORY OF LYME DISEASE: ICD-10-CM

## 2025-05-19 DIAGNOSIS — G89.29 CHRONIC INTRACTABLE HEADACHE, UNSPECIFIED HEADACHE TYPE: ICD-10-CM

## 2025-05-19 DIAGNOSIS — M19.011 PRIMARY OSTEOARTHRITIS OF BOTH SHOULDERS: ICD-10-CM

## 2025-05-19 DIAGNOSIS — R11.0 NAUSEA: ICD-10-CM

## 2025-05-19 DIAGNOSIS — G47.9 SLEEP DISTURBANCE: ICD-10-CM

## 2025-05-19 DIAGNOSIS — F32.4 MAJOR DEPRESSIVE DISORDER WITH SINGLE EPISODE, IN PARTIAL REMISSION: Primary | ICD-10-CM

## 2025-05-19 DIAGNOSIS — E55.9 VITAMIN D DEFICIENCY, UNSPECIFIED: ICD-10-CM

## 2025-05-19 DIAGNOSIS — M17.0 PRIMARY OSTEOARTHRITIS OF BOTH KNEES: ICD-10-CM

## 2025-05-19 DIAGNOSIS — J45.20 MILD INTERMITTENT ASTHMA WITHOUT COMPLICATION: ICD-10-CM

## 2025-05-19 DIAGNOSIS — R05.3 CHRONIC COUGH: ICD-10-CM

## 2025-05-19 DIAGNOSIS — M15.9 GENERALIZED OSTEOARTHRITIS OF MULTIPLE SITES: ICD-10-CM

## 2025-05-19 DIAGNOSIS — D68.51 HOMOZYGOUS FACTOR V LEIDEN MUTATION: ICD-10-CM

## 2025-05-19 DIAGNOSIS — Z96.653 S/P TOTAL KNEE REPLACEMENT, BILATERAL: ICD-10-CM

## 2025-05-19 DIAGNOSIS — J45.21 MILD INTERMITTENT ASTHMA WITH ACUTE EXACERBATION: ICD-10-CM

## 2025-05-19 DIAGNOSIS — R73.01 IMPAIRED FASTING BLOOD SUGAR: ICD-10-CM

## 2025-05-19 DIAGNOSIS — M19.012 PRIMARY OSTEOARTHRITIS OF BOTH SHOULDERS: ICD-10-CM

## 2025-05-19 DIAGNOSIS — E78.5 HYPERLIPIDEMIA, UNSPECIFIED HYPERLIPIDEMIA TYPE: ICD-10-CM

## 2025-05-19 DIAGNOSIS — J30.2 SEASONAL ALLERGIES: ICD-10-CM

## 2025-05-19 DIAGNOSIS — R51.9 CHRONIC INTRACTABLE HEADACHE, UNSPECIFIED HEADACHE TYPE: ICD-10-CM

## 2025-05-19 DIAGNOSIS — G89.4 CHRONIC PAIN SYNDROME: ICD-10-CM

## 2025-05-19 DIAGNOSIS — G43.809 OTHER MIGRAINE WITHOUT STATUS MIGRAINOSUS, NOT INTRACTABLE: ICD-10-CM

## 2025-05-19 DIAGNOSIS — K44.9 HIATAL HERNIA: ICD-10-CM

## 2025-05-19 DIAGNOSIS — K21.9 GASTROESOPHAGEAL REFLUX DISEASE WITHOUT ESOPHAGITIS: ICD-10-CM

## 2025-05-19 DIAGNOSIS — D12.6 TUBULAR ADENOMA OF COLON: ICD-10-CM

## 2025-05-19 DIAGNOSIS — R53.82 CHRONIC FATIGUE: ICD-10-CM

## 2025-05-19 DIAGNOSIS — D69.1 DELTA STORAGE POOL DISEASE (HCC): ICD-10-CM

## 2025-05-19 DIAGNOSIS — K31.84 GASTROPARESIS: ICD-10-CM

## 2025-05-19 RX ORDER — NARATRIPTAN 2.5 MG/1
TABLET ORAL
COMMUNITY
Start: 2025-04-17

## 2025-05-19 RX ORDER — BUTALBITAL, ACETAMINOPHEN AND CAFFEINE 50; 325; 40 MG/1; MG/1; MG/1
1 TABLET ORAL EVERY 4 HOURS PRN
COMMUNITY

## 2025-05-19 RX ORDER — ERENUMAB-AOOE 140 MG/ML
INJECTION, SOLUTION SUBCUTANEOUS
COMMUNITY
Start: 2025-05-16

## 2025-05-19 RX ORDER — DESVENLAFAXINE 100 MG/1
100 TABLET, EXTENDED RELEASE ORAL DAILY
Qty: 90 TABLET | Refills: 3 | Status: SHIPPED | OUTPATIENT
Start: 2025-05-19

## 2025-05-19 ASSESSMENT — ENCOUNTER SYMPTOMS
BACK PAIN: 1
SINUS PRESSURE: 0
NAUSEA: 0
WHEEZING: 0
RHINORRHEA: 0
COLOR CHANGE: 0
VOMITING: 0
EYE REDNESS: 0
PHOTOPHOBIA: 0
CHEST TIGHTNESS: 0
BLOOD IN STOOL: 0
SHORTNESS OF BREATH: 0
SORE THROAT: 0
EYE PAIN: 0
CONSTIPATION: 0
DIARRHEA: 0
STRIDOR: 0
ABDOMINAL PAIN: 0

## 2025-05-19 NOTE — PROGRESS NOTES
Toshia Alcala (:  1950) is a 74 y.o. female,Established patient, here for evaluation of the following chief complaint(s):    Depression         Assessment & Plan  Major depressive disorder with single episode, in partial remission       Orders:    desvenlafaxine succinate (PRISTIQ) 100 MG TB24 extended release tablet; Take 1 tablet by mouth daily    Anxiety       Orders:    desvenlafaxine succinate (PRISTIQ) 100 MG TB24 extended release tablet; Take 1 tablet by mouth daily    Sleep disturbance            Chronic fatigue            Mild intermittent asthma without complication            Chronic cough            Seasonal allergies            Gastroparesis            Nausea            Gastroesophageal reflux disease without esophagitis            Hiatal hernia            Chronic intractable headache, unspecified headache type            Other migraine without status migrainosus, not intractable            History of meningitis            History of Lyme disease            Chronic pain syndrome            Neck pain            Cervical stenosis of spine            Heart murmur            Hyperlipidemia, unspecified hyperlipidemia type            Impaired fasting blood sugar            Generalized osteoarthritis of multiple sites            Primary osteoarthritis of both shoulders            Primary osteoarthritis of both knees            S/p total knee replacement, bilateral            Vitamin D deficiency, unspecified            Homozygous Factor V Leiden mutation            Delta storage pool disease (HCC)            Tubular adenoma of colon                Continue same medications  Continue Pristiq at same dosage  Continue BuSpar  Continue trazodone at same dosage  Good sleep hygiene recommended  Daily exercise and healthy diet encouraged  Continue Breo and albuterol - temporarily increase breo to 2 puff daily for 1-2 weeks and increase albuterol to 3-4 times daily for 1-2 weeks  Obtain CXR if cough

## 2025-05-19 NOTE — ASSESSMENT & PLAN NOTE
Orders:    desvenlafaxine succinate (PRISTIQ) 100 MG TB24 extended release tablet; Take 1 tablet by mouth daily

## 2025-05-20 RX ORDER — BUSPIRONE HYDROCHLORIDE 5 MG/1
5 TABLET ORAL 3 TIMES DAILY
Qty: 90 TABLET | Refills: 1 | Status: SHIPPED | OUTPATIENT
Start: 2025-05-20

## 2025-05-20 RX ORDER — CELECOXIB 200 MG/1
200 CAPSULE ORAL DAILY
Qty: 90 CAPSULE | Refills: 3 | Status: SHIPPED | OUTPATIENT
Start: 2025-05-20

## 2025-05-20 RX ORDER — ALBUTEROL SULFATE 90 UG/1
2 INHALANT RESPIRATORY (INHALATION) EVERY 4 HOURS PRN
Qty: 8.5 G | Refills: 1 | Status: SHIPPED | OUTPATIENT
Start: 2025-05-20

## 2025-05-20 ASSESSMENT — ENCOUNTER SYMPTOMS: COUGH: 1

## 2025-05-20 NOTE — TELEPHONE ENCOUNTER
LOV 05/19/2025   RTO 08/25/2025  LRF Albuterol sulfate 02/09/2024, Buspar 10/03/2024, Celebrex 03/16/2024          Controlled Substance Monitoring:    Acute and Chronic Pain Monitoring:        No data to display

## 2025-05-30 ENCOUNTER — TELEPHONE (OUTPATIENT)
Dept: FAMILY MEDICINE CLINIC | Age: 75
End: 2025-05-30

## 2025-05-30 NOTE — TELEPHONE ENCOUNTER
Patient called stated she got a call from a 1-800 number for possible recall on her medication Reglan. Writer spoke with [facility] and she spoke with the pharmacist to verify if patients medication is part of the recall. Pharmacist stated patients medication isn't part of the recall and patient is okay to continue taking medication. Writer spoke with patient and gave her the information.

## 2025-08-11 ENCOUNTER — HOSPITAL ENCOUNTER (OUTPATIENT)
Age: 75
Setting detail: SPECIMEN
Discharge: HOME | End: 2025-08-11

## 2025-08-11 ENCOUNTER — OFFICE VISIT (OUTPATIENT)
Dept: FAMILY MEDICINE CLINIC | Age: 75
End: 2025-08-11
Payer: MEDICARE

## 2025-08-11 VITALS
WEIGHT: 156 LBS | TEMPERATURE: 97.9 F | HEART RATE: 70 BPM | OXYGEN SATURATION: 97 % | SYSTOLIC BLOOD PRESSURE: 106 MMHG | BODY MASS INDEX: 27.64 KG/M2 | DIASTOLIC BLOOD PRESSURE: 62 MMHG | HEIGHT: 63 IN

## 2025-08-11 DIAGNOSIS — E78.5 HYPERLIPIDEMIA, UNSPECIFIED HYPERLIPIDEMIA TYPE: ICD-10-CM

## 2025-08-11 DIAGNOSIS — R10.13 EPIGASTRIC PAIN: ICD-10-CM

## 2025-08-11 DIAGNOSIS — R11.0 NAUSEA: ICD-10-CM

## 2025-08-11 DIAGNOSIS — R73.01 IMPAIRED FASTING BLOOD SUGAR: ICD-10-CM

## 2025-08-11 DIAGNOSIS — R53.82 CHRONIC FATIGUE: ICD-10-CM

## 2025-08-11 DIAGNOSIS — K31.84 GASTROPARESIS: ICD-10-CM

## 2025-08-11 DIAGNOSIS — Z12.31 SCREENING MAMMOGRAM FOR BREAST CANCER: ICD-10-CM

## 2025-08-11 DIAGNOSIS — M25.50 ARTHRALGIA OF MULTIPLE JOINTS: Primary | ICD-10-CM

## 2025-08-11 DIAGNOSIS — K21.9 GASTROESOPHAGEAL REFLUX DISEASE WITHOUT ESOPHAGITIS: ICD-10-CM

## 2025-08-11 DIAGNOSIS — Z86.19 HISTORY OF LYME DISEASE: ICD-10-CM

## 2025-08-11 DIAGNOSIS — M25.50 ARTHRALGIA OF MULTIPLE JOINTS: ICD-10-CM

## 2025-08-11 DIAGNOSIS — S00.06XD TICK BITE OF SCALP, SUBSEQUENT ENCOUNTER: ICD-10-CM
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
## 2025-08-11 DIAGNOSIS — E55.9 VITAMIN D DEFICIENCY, UNSPECIFIED: ICD-10-CM

## 2025-08-11 DIAGNOSIS — R68.81 EARLY SATIETY: ICD-10-CM

## 2025-08-11 DIAGNOSIS — K44.9 HIATAL HERNIA: ICD-10-CM

## 2025-08-11 LAB
25(OH)D3 SERPL-MCNC: 58 NG/ML (ref 30–100)
ALBUMIN SERPL-MCNC: 4.4 G/DL (ref 3.5–5.2)
ALBUMIN/GLOB SERPL: 1.7 {RATIO} (ref 1–2.5)
ALP SERPL-CCNC: 65 U/L (ref 35–104)
ALT SERPL-CCNC: 19 U/L (ref 10–35)
AMYLASE SERPL-CCNC: 53 U/L (ref 28–100)
ANION GAP SERPL CALCULATED.3IONS-SCNC: 14 MMOL/L (ref 9–16)
AST SERPL-CCNC: 20 U/L (ref 10–35)
BASOPHILS # BLD: 0.07 K/UL (ref 0–0.2)
BASOPHILS NFR BLD: 1 % (ref 0–2)
BILIRUB SERPL-MCNC: 0.3 MG/DL (ref 0–1.2)
BUN SERPL-MCNC: 16 MG/DL (ref 8–23)
CALCIUM SERPL-MCNC: 10.1 MG/DL (ref 8.6–10.4)
CHLORIDE SERPL-SCNC: 101 MMOL/L (ref 98–107)
CHOLEST SERPL-MCNC: 307 MG/DL (ref 0–199)
CHOLESTEROL/HDL RATIO: 6.3
CO2 SERPL-SCNC: 24 MMOL/L (ref 20–31)
CREAT SERPL-MCNC: 0.9 MG/DL (ref 0.6–0.9)
CRP SERPL HS-MCNC: <3 MG/L (ref 0–5)
EOSINOPHIL # BLD: 0.24 K/UL (ref 0–0.44)
EOSINOPHILS RELATIVE PERCENT: 4 % (ref 1–4)
ERYTHROCYTE [DISTWIDTH] IN BLOOD BY AUTOMATED COUNT: 13 % (ref 11.8–14.4)
ERYTHROCYTE [SEDIMENTATION RATE] IN BLOOD BY PHOTOMETRIC METHOD: 3 MM/HR (ref 0–30)
EST. AVERAGE GLUCOSE BLD GHB EST-MCNC: 103 MG/DL
GFR, ESTIMATED: 68 ML/MIN/1.73M2
GLUCOSE SERPL-MCNC: 100 MG/DL (ref 74–99)
HBA1C MFR BLD: 5.2 % (ref 4–6)
HCT VFR BLD AUTO: 41.5 % (ref 36.3–47.1)
HDLC SERPL-MCNC: 49 MG/DL
HGB BLD-MCNC: 14 G/DL (ref 11.9–15.1)
IMM GRANULOCYTES # BLD AUTO: <0.03 K/UL (ref 0–0.3)
IMM GRANULOCYTES NFR BLD: 0 %
LDLC SERPL CALC-MCNC: 217 MG/DL (ref 0–100)
LIPASE SERPL-CCNC: 36 U/L (ref 13–60)
LYMPHOCYTES NFR BLD: 1.92 K/UL (ref 1.1–3.7)
LYMPHOCYTES RELATIVE PERCENT: 32 % (ref 24–43)
MCH RBC QN AUTO: 30.8 PG (ref 25.2–33.5)
MCHC RBC AUTO-ENTMCNC: 33.7 G/DL (ref 28.4–34.8)
MCV RBC AUTO: 91.2 FL (ref 82.6–102.9)
MONOCYTES NFR BLD: 0.71 K/UL (ref 0.1–1.2)
MONOCYTES NFR BLD: 12 % (ref 3–12)
NEUTROPHILS NFR BLD: 51 % (ref 36–65)
NEUTS SEG NFR BLD: 3.06 K/UL (ref 1.5–8.1)
NRBC BLD-RTO: 0 PER 100 WBC
PLATELET # BLD AUTO: 274 K/UL (ref 138–453)
PMV BLD AUTO: 8.9 FL (ref 8.1–13.5)
POTASSIUM SERPL-SCNC: 3.9 MMOL/L (ref 3.7–5.3)
PROT SERPL-MCNC: 7 G/DL (ref 6.6–8.7)
RBC # BLD AUTO: 4.55 M/UL (ref 3.95–5.11)
SODIUM SERPL-SCNC: 139 MMOL/L (ref 136–145)
TRIGL SERPL-MCNC: 205 MG/DL
TSH SERPL DL<=0.05 MIU/L-ACNC: 1.18 UIU/ML (ref 0.27–4.2)
VLDLC SERPL CALC-MCNC: 41 MG/DL (ref 1–30)
WBC OTHER # BLD: 6 K/UL (ref 3.5–11.3)

## 2025-08-11 PROCEDURE — 1123F ACP DISCUSS/DSCN MKR DOCD: CPT | Performed by: PEDIATRICS

## 2025-08-11 PROCEDURE — G8417 CALC BMI ABV UP PARAM F/U: HCPCS | Performed by: PEDIATRICS

## 2025-08-11 PROCEDURE — G8399 PT W/DXA RESULTS DOCUMENT: HCPCS | Performed by: PEDIATRICS

## 2025-08-11 PROCEDURE — G2211 COMPLEX E/M VISIT ADD ON: HCPCS | Performed by: PEDIATRICS

## 2025-08-11 PROCEDURE — 3017F COLORECTAL CA SCREEN DOC REV: CPT | Performed by: PEDIATRICS

## 2025-08-11 PROCEDURE — 99214 OFFICE O/P EST MOD 30 MIN: CPT | Performed by: PEDIATRICS

## 2025-08-11 PROCEDURE — 1090F PRES/ABSN URINE INCON ASSESS: CPT | Performed by: PEDIATRICS

## 2025-08-11 PROCEDURE — G8427 DOCREV CUR MEDS BY ELIG CLIN: HCPCS | Performed by: PEDIATRICS

## 2025-08-11 PROCEDURE — 1036F TOBACCO NON-USER: CPT | Performed by: PEDIATRICS

## 2025-08-13 LAB — B BURGDOR AB SER IA-ACNC: 0.08 IV

## 2025-08-25 ENCOUNTER — OFFICE VISIT (OUTPATIENT)
Dept: FAMILY MEDICINE CLINIC | Age: 75
End: 2025-08-25

## 2025-08-25 VITALS
WEIGHT: 155.6 LBS | HEART RATE: 94 BPM | OXYGEN SATURATION: 97 % | TEMPERATURE: 98.1 F | DIASTOLIC BLOOD PRESSURE: 74 MMHG | BODY MASS INDEX: 28.01 KG/M2 | SYSTOLIC BLOOD PRESSURE: 118 MMHG

## 2025-08-25 DIAGNOSIS — F41.9 ANXIETY: ICD-10-CM

## 2025-08-25 DIAGNOSIS — M48.02 CERVICAL STENOSIS OF SPINE: ICD-10-CM

## 2025-08-25 DIAGNOSIS — R01.1 HEART MURMUR: ICD-10-CM

## 2025-08-25 DIAGNOSIS — Z86.61 HISTORY OF MENINGITIS: ICD-10-CM

## 2025-08-25 DIAGNOSIS — K44.9 HIATAL HERNIA: ICD-10-CM

## 2025-08-25 DIAGNOSIS — D68.51 HOMOZYGOUS FACTOR V LEIDEN MUTATION: ICD-10-CM

## 2025-08-25 DIAGNOSIS — G43.809 OTHER MIGRAINE WITHOUT STATUS MIGRAINOSUS, NOT INTRACTABLE: ICD-10-CM

## 2025-08-25 DIAGNOSIS — E78.5 HYPERLIPIDEMIA, UNSPECIFIED HYPERLIPIDEMIA TYPE: ICD-10-CM

## 2025-08-25 DIAGNOSIS — Z12.31 SCREENING MAMMOGRAM FOR BREAST CANCER: ICD-10-CM

## 2025-08-25 DIAGNOSIS — Z96.653 S/P TOTAL KNEE REPLACEMENT, BILATERAL: ICD-10-CM

## 2025-08-25 DIAGNOSIS — K31.84 GASTROPARESIS: ICD-10-CM

## 2025-08-25 DIAGNOSIS — G89.29 CHRONIC INTRACTABLE HEADACHE, UNSPECIFIED HEADACHE TYPE: ICD-10-CM

## 2025-08-25 DIAGNOSIS — Z86.19 HISTORY OF LYME DISEASE: ICD-10-CM

## 2025-08-25 DIAGNOSIS — J45.20 MILD INTERMITTENT ASTHMA WITHOUT COMPLICATION: ICD-10-CM

## 2025-08-25 DIAGNOSIS — M19.011 PRIMARY OSTEOARTHRITIS OF BOTH SHOULDERS: ICD-10-CM

## 2025-08-25 DIAGNOSIS — K21.9 GASTROESOPHAGEAL REFLUX DISEASE WITHOUT ESOPHAGITIS: ICD-10-CM

## 2025-08-25 DIAGNOSIS — J30.2 SEASONAL ALLERGIES: ICD-10-CM

## 2025-08-25 DIAGNOSIS — M17.0 PRIMARY OSTEOARTHRITIS OF BOTH KNEES: ICD-10-CM

## 2025-08-25 DIAGNOSIS — R10.13 EPIGASTRIC PAIN: ICD-10-CM

## 2025-08-25 DIAGNOSIS — R51.9 CHRONIC INTRACTABLE HEADACHE, UNSPECIFIED HEADACHE TYPE: ICD-10-CM

## 2025-08-25 DIAGNOSIS — E55.9 VITAMIN D DEFICIENCY, UNSPECIFIED: ICD-10-CM

## 2025-08-25 DIAGNOSIS — G47.9 SLEEP DISTURBANCE: ICD-10-CM

## 2025-08-25 DIAGNOSIS — R73.01 IMPAIRED FASTING BLOOD SUGAR: ICD-10-CM

## 2025-08-25 DIAGNOSIS — R05.3 CHRONIC COUGH: ICD-10-CM

## 2025-08-25 DIAGNOSIS — M15.9 GENERALIZED OSTEOARTHRITIS OF MULTIPLE SITES: ICD-10-CM

## 2025-08-25 DIAGNOSIS — F32.4 MAJOR DEPRESSIVE DISORDER WITH SINGLE EPISODE, IN PARTIAL REMISSION: Primary | ICD-10-CM

## 2025-08-25 DIAGNOSIS — D12.6 TUBULAR ADENOMA OF COLON: ICD-10-CM

## 2025-08-25 DIAGNOSIS — G89.4 CHRONIC PAIN SYNDROME: ICD-10-CM

## 2025-08-25 DIAGNOSIS — M19.012 PRIMARY OSTEOARTHRITIS OF BOTH SHOULDERS: ICD-10-CM

## 2025-08-25 DIAGNOSIS — D69.1 DELTA STORAGE POOL DISEASE (HCC): ICD-10-CM

## 2025-08-25 DIAGNOSIS — R11.0 NAUSEA: ICD-10-CM

## 2025-08-25 DIAGNOSIS — R68.81 EARLY SATIETY: ICD-10-CM

## 2025-08-25 DIAGNOSIS — R53.82 CHRONIC FATIGUE: ICD-10-CM

## 2025-08-25 DIAGNOSIS — M54.2 NECK PAIN: ICD-10-CM

## 2025-08-25 ASSESSMENT — ENCOUNTER SYMPTOMS
NAUSEA: 0
VOMITING: 0
RHINORRHEA: 0
BACK PAIN: 1
SHORTNESS OF BREATH: 0
DIARRHEA: 0
STRIDOR: 0
WHEEZING: 0
ABDOMINAL PAIN: 0
CHEST TIGHTNESS: 0
SINUS PRESSURE: 0
PHOTOPHOBIA: 0
SORE THROAT: 0
EYE PAIN: 0
EYE REDNESS: 0
COLOR CHANGE: 0
BLOOD IN STOOL: 0
CONSTIPATION: 0

## 2025-08-26 ASSESSMENT — ENCOUNTER SYMPTOMS
COUGH: 0
BLOOD IN STOOL: 0
RECTAL PAIN: 0
STRIDOR: 0
EYE PAIN: 0
RHINORRHEA: 0
COUGH: 0
PHOTOPHOBIA: 0
CONSTIPATION: 0
VOICE CHANGE: 0
NAUSEA: 1
WHEEZING: 0
SORE THROAT: 0
ABDOMINAL PAIN: 1
COLOR CHANGE: 0
DIARRHEA: 0
EYE REDNESS: 0
SHORTNESS OF BREATH: 0
ABDOMINAL DISTENTION: 1

## 2025-09-03 DIAGNOSIS — E78.5 HYPERLIPIDEMIA, UNSPECIFIED HYPERLIPIDEMIA TYPE: ICD-10-CM

## 2025-09-03 DIAGNOSIS — F41.9 ANXIETY: ICD-10-CM

## 2025-09-03 RX ORDER — EZETIMIBE 10 MG/1
10 TABLET ORAL DAILY
Qty: 90 TABLET | Refills: 1 | Status: SHIPPED | OUTPATIENT
Start: 2025-09-03 | End: 2026-09-04

## 2025-09-03 RX ORDER — BUSPIRONE HYDROCHLORIDE 5 MG/1
5 TABLET ORAL 3 TIMES DAILY
Qty: 90 TABLET | Refills: 1 | Status: SHIPPED | OUTPATIENT
Start: 2025-09-03